# Patient Record
Sex: FEMALE | Race: WHITE | NOT HISPANIC OR LATINO
[De-identification: names, ages, dates, MRNs, and addresses within clinical notes are randomized per-mention and may not be internally consistent; named-entity substitution may affect disease eponyms.]

---

## 2017-01-19 ENCOUNTER — RECORD ABSTRACTING (OUTPATIENT)
Age: 72
End: 2017-01-19

## 2017-01-19 DIAGNOSIS — Z87.898 PERSONAL HISTORY OF OTHER SPECIFIED CONDITIONS: ICD-10-CM

## 2017-01-19 DIAGNOSIS — Z86.79 PERSONAL HISTORY OF OTHER DISEASES OF THE CIRCULATORY SYSTEM: ICD-10-CM

## 2017-01-19 DIAGNOSIS — Z95.0 PRESENCE OF CARDIAC PACEMAKER: ICD-10-CM

## 2017-01-19 DIAGNOSIS — Z78.9 OTHER SPECIFIED HEALTH STATUS: ICD-10-CM

## 2017-01-19 DIAGNOSIS — Z92.89 PERSONAL HISTORY OF OTHER MEDICAL TREATMENT: ICD-10-CM

## 2017-10-17 ENCOUNTER — APPOINTMENT (OUTPATIENT)
Dept: CARDIOLOGY | Facility: CLINIC | Age: 72
End: 2017-10-17

## 2017-10-17 VITALS
HEIGHT: 65 IN | HEART RATE: 84 BPM | OXYGEN SATURATION: 98 % | WEIGHT: 149 LBS | SYSTOLIC BLOOD PRESSURE: 135 MMHG | DIASTOLIC BLOOD PRESSURE: 83 MMHG | BODY MASS INDEX: 24.83 KG/M2

## 2017-10-18 ENCOUNTER — APPOINTMENT (OUTPATIENT)
Dept: CARDIOLOGY | Facility: CLINIC | Age: 72
End: 2017-10-18

## 2017-10-18 VITALS
SYSTOLIC BLOOD PRESSURE: 119 MMHG | BODY MASS INDEX: 24.83 KG/M2 | DIASTOLIC BLOOD PRESSURE: 73 MMHG | OXYGEN SATURATION: 97 % | WEIGHT: 149 LBS | HEIGHT: 65 IN | HEART RATE: 63 BPM

## 2017-10-18 RX ORDER — PAROXETINE HYDROCHLORIDE 10 MG/1
10 TABLET, FILM COATED ORAL
Qty: 90 | Refills: 0 | Status: ACTIVE | COMMUNITY
Start: 2017-08-23

## 2017-10-18 RX ORDER — LEVOTHYROXINE SODIUM 0.1 MG/1
100 TABLET ORAL
Qty: 90 | Refills: 0 | Status: ACTIVE | COMMUNITY
Start: 2017-08-23

## 2017-10-18 RX ORDER — CILOSTAZOL 50 MG/1
50 TABLET ORAL
Qty: 180 | Refills: 0 | Status: ACTIVE | COMMUNITY
Start: 2017-08-23

## 2017-11-01 ENCOUNTER — OUTPATIENT (OUTPATIENT)
Dept: OUTPATIENT SERVICES | Facility: HOSPITAL | Age: 72
LOS: 1 days | Discharge: HOME | End: 2017-11-01

## 2017-11-01 DIAGNOSIS — I25.10 ATHEROSCLEROTIC HEART DISEASE OF NATIVE CORONARY ARTERY WITHOUT ANGINA PECTORIS: ICD-10-CM

## 2017-11-01 DIAGNOSIS — C90.00 MULTIPLE MYELOMA NOT HAVING ACHIEVED REMISSION: ICD-10-CM
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
## 2017-11-23 ENCOUNTER — INPATIENT (INPATIENT)
Facility: HOSPITAL | Age: 72
LOS: 10 days | Discharge: HOME HEALTH | End: 2017-12-04
Attending: INTERNAL MEDICINE | Admitting: INTERNAL MEDICINE

## 2017-11-23 DIAGNOSIS — C90.00 MULTIPLE MYELOMA NOT HAVING ACHIEVED REMISSION: ICD-10-CM
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
## 2017-11-29 ENCOUNTER — APPOINTMENT (OUTPATIENT)
Dept: CARDIOLOGY | Facility: CLINIC | Age: 72
End: 2017-11-29

## 2017-12-04 ENCOUNTER — RESULT REVIEW (OUTPATIENT)
Age: 72
End: 2017-12-04

## 2017-12-05 ENCOUNTER — APPOINTMENT (OUTPATIENT)
Dept: HEMATOLOGY ONCOLOGY | Facility: CLINIC | Age: 72
End: 2017-12-05

## 2017-12-05 VITALS
BODY MASS INDEX: 25.66 KG/M2 | HEIGHT: 65 IN | WEIGHT: 154 LBS | HEART RATE: 84 BPM | RESPIRATION RATE: 14 BRPM | SYSTOLIC BLOOD PRESSURE: 161 MMHG | DIASTOLIC BLOOD PRESSURE: 82 MMHG | TEMPERATURE: 98.1 F

## 2017-12-05 RX ORDER — PNV NO.95/FERROUS FUM/FOLIC AC 28MG-0.8MG
500-400 TABLET ORAL
Qty: 120 | Refills: 3 | Status: ACTIVE | COMMUNITY
Start: 2017-12-05 | End: 1900-01-01

## 2017-12-07 ENCOUNTER — APPOINTMENT (OUTPATIENT)
Age: 72
End: 2017-12-07

## 2017-12-07 ENCOUNTER — OUTPATIENT (OUTPATIENT)
Dept: OUTPATIENT SERVICES | Facility: HOSPITAL | Age: 72
LOS: 1 days | Discharge: HOME | End: 2017-12-07

## 2017-12-07 DIAGNOSIS — E83.52 HYPERCALCEMIA: ICD-10-CM

## 2017-12-07 DIAGNOSIS — M84.421A PATHOLOGICAL FRACTURE, RIGHT HUMERUS, INITIAL ENCOUNTER FOR FRACTURE: ICD-10-CM

## 2017-12-07 DIAGNOSIS — C90.00 MULTIPLE MYELOMA NOT HAVING ACHIEVED REMISSION: ICD-10-CM
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
## 2017-12-07 DIAGNOSIS — S80.12XD CONTUSION OF LEFT LOWER LEG, SUBSEQUENT ENCOUNTER: ICD-10-CM

## 2017-12-07 DIAGNOSIS — I10 ESSENTIAL (PRIMARY) HYPERTENSION: ICD-10-CM

## 2017-12-07 LAB
25(OH)D3 SERPL-MCNC: 36 NG/ML
B2 MICROGLOB SERPL-MCNC: 4.8 MG/L
IGA FLD-MCNC: 33 MG/DL
IGG FLD-MCNC: 2860 MG/DL
IGM SERPL-MCNC: 9 MG/DL
INTERPRETATION SERPL IFE-IMP: DETECTED
VITAMIN D2 SERPL-MCNC: <4 NG/ML
VITAMIN D3 SERPL-MCNC: 36 NG/ML
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
## 2017-12-08 DIAGNOSIS — D63.0 ANEMIA IN NEOPLASTIC DISEASE: ICD-10-CM

## 2017-12-08 DIAGNOSIS — S80.12XA CONTUSION OF LEFT LOWER LEG, INITIAL ENCOUNTER: ICD-10-CM

## 2017-12-08 DIAGNOSIS — I25.10 ATHEROSCLEROTIC HEART DISEASE OF NATIVE CORONARY ARTERY WITHOUT ANGINA PECTORIS: ICD-10-CM

## 2017-12-08 DIAGNOSIS — I12.9 HYPERTENSIVE CHRONIC KIDNEY DISEASE WITH STAGE 1 THROUGH STAGE 4 CHRONIC KIDNEY DISEASE, OR UNSPECIFIED CHRONIC KIDNEY DISEASE: ICD-10-CM
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
## 2017-12-08 DIAGNOSIS — Z91.81 HISTORY OF FALLING: ICD-10-CM

## 2017-12-08 DIAGNOSIS — Z95.0 PRESENCE OF CARDIAC PACEMAKER: ICD-10-CM

## 2017-12-08 DIAGNOSIS — N17.9 ACUTE KIDNEY FAILURE, UNSPECIFIED: ICD-10-CM

## 2017-12-08 DIAGNOSIS — N18.3 CHRONIC KIDNEY DISEASE, STAGE 3 (MODERATE): ICD-10-CM
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
## 2017-12-08 DIAGNOSIS — C90.00 MULTIPLE MYELOMA NOT HAVING ACHIEVED REMISSION: ICD-10-CM

## 2017-12-08 DIAGNOSIS — F03.90 UNSPECIFIED DEMENTIA WITHOUT BEHAVIORAL DISTURBANCE: ICD-10-CM

## 2017-12-08 DIAGNOSIS — M79.81 NONTRAUMATIC HEMATOMA OF SOFT TISSUE: ICD-10-CM

## 2017-12-08 DIAGNOSIS — G91.2 (IDIOPATHIC) NORMAL PRESSURE HYDROCEPHALUS: ICD-10-CM

## 2017-12-11 ENCOUNTER — APPOINTMENT (OUTPATIENT)
Dept: HEMATOLOGY ONCOLOGY | Facility: CLINIC | Age: 72
End: 2017-12-11

## 2017-12-12 ENCOUNTER — APPOINTMENT (OUTPATIENT)
Dept: INFUSION THERAPY | Facility: CLINIC | Age: 72
End: 2017-12-12

## 2017-12-13 ENCOUNTER — APPOINTMENT (OUTPATIENT)
Dept: INFUSION THERAPY | Facility: CLINIC | Age: 72
End: 2017-12-13

## 2017-12-14 ENCOUNTER — OUTPATIENT (OUTPATIENT)
Dept: OUTPATIENT SERVICES | Facility: HOSPITAL | Age: 72
LOS: 1 days | Discharge: HOME | End: 2017-12-14

## 2017-12-14 ENCOUNTER — APPOINTMENT (OUTPATIENT)
Dept: WOUND CARE | Facility: CLINIC | Age: 72
End: 2017-12-14

## 2017-12-14 DIAGNOSIS — C90.00 MULTIPLE MYELOMA NOT HAVING ACHIEVED REMISSION: ICD-10-CM
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
## 2017-12-14 DIAGNOSIS — Z02.9 ENCOUNTER FOR ADMINISTRATIVE EXAMINATIONS, UNSPECIFIED: ICD-10-CM

## 2017-12-14 LAB
ALBUMIN SERPL-MCNC: 3.8 G/DL
ALBUMIN/GLOB SERPL: 0.95
ALP SERPL-CCNC: 110 IU/L
ALT SERPL-CCNC: 13 IU/L
ANION GAP SERPL CALC-SCNC: 9 MEQ/L
AST SERPL-CCNC: 24 IU/L
BASOPHILS # BLD: 0.04 TH/MM3
BASOPHILS NFR BLD: 0.6 %
BILIRUB SERPL-MCNC: 0.7 MG/DL
BUN SERPL-MCNC: 18 MG/DL
BUN/CREAT SERPL: 19.6 %
CALCIUM SERPL-MCNC: 9.1 MG/DL
CHLORIDE SERPL-SCNC: 108 MEQ/L
CO2 SERPL-SCNC: 22 MEQ/L
CREAT SERPL-MCNC: 0.92 MG/DL
EOSINOPHIL # BLD: 0.14 TH/MM3
EOSINOPHIL NFR BLD: 2.2 %
ERYTHROCYTE [DISTWIDTH] IN BLOOD BY AUTOMATED COUNT: 16.2 %
GFR SERPL CREATININE-BSD FRML MDRD: 60
GLUCOSE SERPL-MCNC: 79 MG/DL
GRANULOCYTES # BLD: 3.45 TH/MM3
GRANULOCYTES NFR BLD: 54.7 %
HCT VFR BLD AUTO: 26.2 %
HGB BLD-MCNC: 9 G/DL
IMM GRANULOCYTES # BLD: 0.06 TH/MM3
IMM GRANULOCYTES NFR BLD: 1 %
LYMPHOCYTES # BLD: 1.98 TH/MM3
LYMPHOCYTES NFR BLD: 31.4 %
MCH RBC QN AUTO: 31.8 PG
MCHC RBC AUTO-ENTMCNC: 34.4 G/DL
MCV RBC AUTO: 92.6 FL
MONOCYTES # BLD: 0.64 TH/MM3
MONOCYTES NFR BLD: 10.1 %
PLATELET # BLD: 366 TH/MM3
PMV BLD AUTO: 8.4 FL
POTASSIUM SERPL-SCNC: 3.5 MMOL/L
PROT SERPL-MCNC: 7.8 G/DL
RBC # BLD AUTO: 2.83 MIL/MM3
SODIUM SERPL-SCNC: 139 MEQ/L
WBC # BLD: 6.31 TH/MM3

## 2017-12-19 ENCOUNTER — APPOINTMENT (OUTPATIENT)
Dept: INFUSION THERAPY | Facility: CLINIC | Age: 72
End: 2017-12-19

## 2017-12-19 VITALS
SYSTOLIC BLOOD PRESSURE: 110 MMHG | TEMPERATURE: 97.9 F | HEART RATE: 62 BPM | RESPIRATION RATE: 18 BRPM | DIASTOLIC BLOOD PRESSURE: 55 MMHG

## 2017-12-19 VITALS
SYSTOLIC BLOOD PRESSURE: 133 MMHG | RESPIRATION RATE: 18 BRPM | DIASTOLIC BLOOD PRESSURE: 47 MMHG | TEMPERATURE: 97.5 F | HEART RATE: 66 BPM
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
## 2017-12-19 DIAGNOSIS — S80.12XA CONTUSION OF LEFT LOWER LEG, INITIAL ENCOUNTER: ICD-10-CM
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
## 2017-12-19 LAB
BASOPHILS # BLD: 0.09 TH/MM3
BASOPHILS NFR BLD: 1.6 %
EOSINOPHIL # BLD: 0.16 TH/MM3
EOSINOPHIL NFR BLD: 2.8 %
ERYTHROCYTE [DISTWIDTH] IN BLOOD BY AUTOMATED COUNT: 17.2 %
GRANULOCYTES # BLD: 2.6 TH/MM3
GRANULOCYTES NFR BLD: 45.4 %
HCT VFR BLD AUTO: 29.3 %
HGB BLD-MCNC: 9.8 G/DL
IMM GRANULOCYTES # BLD: 0.11 TH/MM3
IMM GRANULOCYTES NFR BLD: 1.9 %
LYMPHOCYTES # BLD: 2.04 TH/MM3
LYMPHOCYTES NFR BLD: 35.7 %
MCH RBC QN AUTO: 32.3 PG
MCHC RBC AUTO-ENTMCNC: 33.4 G/DL
MCV RBC AUTO: 96.7 FL
MONOCYTES # BLD: 0.72 TH/MM3
MONOCYTES NFR BLD: 12.6 %
PLATELET # BLD: 370 TH/MM3
PMV BLD AUTO: 8.7 FL
RBC # BLD AUTO: 3.03 MIL/MM3
WBC # BLD: 5.72 TH/MM3

## 2017-12-26 ENCOUNTER — APPOINTMENT (OUTPATIENT)
Dept: INFUSION THERAPY | Facility: CLINIC | Age: 72
End: 2017-12-26

## 2017-12-26 ENCOUNTER — RX RENEWAL (OUTPATIENT)
Age: 72
End: 2017-12-26

## 2017-12-26 VITALS
SYSTOLIC BLOOD PRESSURE: 145 MMHG | RESPIRATION RATE: 16 BRPM | TEMPERATURE: 97.5 F | DIASTOLIC BLOOD PRESSURE: 61 MMHG | HEART RATE: 83 BPM

## 2017-12-26 LAB
BASOPHILS # BLD: 0.06 TH/MM3
BASOPHILS NFR BLD: 1 %
EOSINOPHIL # BLD: 0.16 TH/MM3
EOSINOPHIL NFR BLD: 2.6 %
ERYTHROCYTE [DISTWIDTH] IN BLOOD BY AUTOMATED COUNT: 16 %
GRANULOCYTES # BLD: 3.35 TH/MM3
GRANULOCYTES NFR BLD: 55.1 %
HCT VFR BLD AUTO: 24.5 %
HGB BLD-MCNC: 8.2 G/DL
IMM GRANULOCYTES # BLD: 0.25 TH/MM3
IMM GRANULOCYTES NFR BLD: 4.1 %
LYMPHOCYTES # BLD: 1.61 TH/MM3
LYMPHOCYTES NFR BLD: 26.4 %
MCH RBC QN AUTO: 31.9 PG
MCHC RBC AUTO-ENTMCNC: 33.5 G/DL
MCV RBC AUTO: 95.3 FL
MONOCYTES # BLD: 0.66 TH/MM3
MONOCYTES NFR BLD: 10.8 %
PLATELET # BLD: 303 TH/MM3
PMV BLD AUTO: 9.2 FL
RBC # BLD AUTO: 2.57 MIL/MM3
WBC # BLD: 6.09 TH/MM3

## 2017-12-26 RX ORDER — PROCHLORPERAZINE MALEATE 10 MG/1
10 TABLET ORAL EVERY 6 HOURS
Qty: 30 | Refills: 3 | Status: ACTIVE | COMMUNITY
Start: 2017-12-26 | End: 1900-01-01

## 2017-12-28 ENCOUNTER — APPOINTMENT (OUTPATIENT)
Dept: CARDIOLOGY | Facility: CLINIC | Age: 72
End: 2017-12-28

## 2017-12-28 DIAGNOSIS — S81.802A UNSPECIFIED OPEN WOUND, LEFT LOWER LEG, INITIAL ENCOUNTER: ICD-10-CM
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
## 2018-01-02 ENCOUNTER — APPOINTMENT (OUTPATIENT)
Dept: INFUSION THERAPY | Facility: CLINIC | Age: 73
End: 2018-01-02

## 2018-01-02 LAB
BASOPHILS # BLD: 0.06 TH/MM3
BASOPHILS NFR BLD: 0.8 %
EOSINOPHIL # BLD: 0.14 TH/MM3
EOSINOPHIL NFR BLD: 1.9 %
ERYTHROCYTE [DISTWIDTH] IN BLOOD BY AUTOMATED COUNT: 18 %
GRANULOCYTES # BLD: 4.13 TH/MM3
GRANULOCYTES NFR BLD: 54.6 %
HCT VFR BLD AUTO: 25.4 %
HGB BLD-MCNC: 8.4 G/DL
IMM GRANULOCYTES # BLD: 0.17 TH/MM3
IMM GRANULOCYTES NFR BLD: 2.2 %
LYMPHOCYTES # BLD: 2.23 TH/MM3
LYMPHOCYTES NFR BLD: 29.5 %
MCH RBC QN AUTO: 32.8 PG
MCHC RBC AUTO-ENTMCNC: 33.1 G/DL
MCV RBC AUTO: 99.2 FL
MONOCYTES # BLD: 0.83 TH/MM3
MONOCYTES NFR BLD: 11 %
PLATELET # BLD: 327 TH/MM3
PMV BLD AUTO: 9.1 FL
RBC # BLD AUTO: 2.56 MIL/MM3
WBC # BLD: 7.56 TH/MM3

## 2018-01-09 ENCOUNTER — APPOINTMENT (OUTPATIENT)
Dept: INFUSION THERAPY | Facility: CLINIC | Age: 73
End: 2018-01-09

## 2018-01-09 ENCOUNTER — APPOINTMENT (OUTPATIENT)
Dept: HEMATOLOGY ONCOLOGY | Facility: CLINIC | Age: 73
End: 2018-01-09

## 2018-01-09 VITALS
WEIGHT: 143 LBS | BODY MASS INDEX: 23.82 KG/M2 | HEIGHT: 65 IN | HEART RATE: 86 BPM | DIASTOLIC BLOOD PRESSURE: 76 MMHG | TEMPERATURE: 98.7 F | SYSTOLIC BLOOD PRESSURE: 139 MMHG | RESPIRATION RATE: 17 BRPM

## 2018-01-09 DIAGNOSIS — G47.00 INSOMNIA, UNSPECIFIED: ICD-10-CM

## 2018-01-10 LAB
ALBUMIN MFR SERPL ELPH: 52.6 %
ALBUMIN SERPL ELPH-MCNC: 4.4 G/DL
ALBUMIN SERPL-MCNC: 4.1 G/DL
ALBUMIN/GLOB SERPL ELPH: 1.1
ALBUMIN/GLOB SERPL: 1.14
ALP SERPL-CCNC: 57 IU/L
ALPHA1 GLOB MFR SERPL ELPH: 4.9 %
ALPHA1 GLOB SERPL ELPH-MCNC: 0.4 G/DL
ALPHA2 GLOB MFR SERPL ELPH: 6.4 %
ALPHA2 GLOB SERPL ELPH-MCNC: 0.5 G/DL
ALT SERPL-CCNC: 11 IU/L
ANION GAP SERPL CALC-SCNC: 10 MEQ/L
AST SERPL-CCNC: 17 IU/L
B-GLOBULIN SERPL ELPH-MCNC: 0.8 G/DL
BASOPHILS # BLD: 0.01 TH/MM3
BASOPHILS NFR BLD: 0.2 %
BETA1 GLOB MFR SERPL ELPH: 9.3 %
BILIRUB SERPL-MCNC: 0.6 MG/DL
BUN SERPL-MCNC: 21 MG/DL
BUN/CREAT SERPL: 21.6 %
CALCIUM SERPL-MCNC: 8.7 MG/DL
CHLORIDE SERPL-SCNC: 106 MEQ/L
CO2 SERPL-SCNC: 20 MEQ/L
CREAT SERPL-MCNC: 0.97 MG/DL
EOSINOPHIL # BLD: 0.21 TH/MM3
EOSINOPHIL NFR BLD: 4.3 %
ERYTHROCYTE [DISTWIDTH] IN BLOOD BY AUTOMATED COUNT: 17.2 %
GAMMA GLOB MFR SERPL ELPH: 26.8 %
GAMMA GLOB SERPL ELPH-MCNC: 2.3 G/DL
GFR SERPL CREATININE-BSD FRML MDRD: 56
GLUCOSE SERPL-MCNC: 84 MG/DL
GRANULOCYTES # BLD: 3 TH/MM3
GRANULOCYTES NFR BLD: 61.1 %
HCT VFR BLD AUTO: 27.8 %
HGB BLD-MCNC: 8.9 G/DL
IMM GRANULOCYTES # BLD: 0.06 TH/MM3
IMM GRANULOCYTES NFR BLD: 1.2 %
LYMPHOCYTES # BLD: 1.32 TH/MM3
LYMPHOCYTES NFR BLD: 26.9 %
M PROTEIN MFR SERPL ELPH: 24.6 %
M-SPIKE SPE (NORTH): 2.1 G/DL
MCH RBC QN AUTO: 32.5 PG
MCHC RBC AUTO-ENTMCNC: 32 G/DL
MCV RBC AUTO: 101.5 FL
MONOCYTES # BLD: 0.31 TH/MM3
MONOCYTES NFR BLD: 6.3 %
PLATELET # BLD: 279 TH/MM3
PMV BLD AUTO: 9.5 FL
POTASSIUM SERPL-SCNC: 3.8 MMOL/L
PROT PATTERN SERPL ELPH-IMP: 8.4 G/DL
PROT PATTERN SERPL ELPH-IMP: NORMAL
PROT SERPL-MCNC: 7.7 G/DL
PROT SERPL-MCNC: 8.4 G/DL
RBC # BLD AUTO: 2.74 MIL/MM3
SODIUM SERPL-SCNC: 136 MEQ/L
WBC # BLD: 4.91 TH/MM3

## 2018-01-11 ENCOUNTER — APPOINTMENT (OUTPATIENT)
Dept: INFUSION THERAPY | Facility: CLINIC | Age: 73
End: 2018-01-11

## 2018-01-11 ENCOUNTER — APPOINTMENT (OUTPATIENT)
Age: 73
End: 2018-01-11

## 2018-01-12 LAB
KAPPA LC FREE SER-MCNC: 9.9 MG/L
KAPPA LC FREE/LAMBDA FREE SER: 0.03
LAMBDA LC FREE SERPL-MCNC: 316.6 MG/L

## 2018-01-15 ENCOUNTER — RX RENEWAL (OUTPATIENT)
Age: 73
End: 2018-01-15

## 2018-01-17 ENCOUNTER — RX RENEWAL (OUTPATIENT)
Age: 73
End: 2018-01-17

## 2018-01-18 ENCOUNTER — APPOINTMENT (OUTPATIENT)
Dept: INFUSION THERAPY | Facility: CLINIC | Age: 73
End: 2018-01-18

## 2018-01-18 LAB
BASOPHILS # BLD: 0.02 TH/MM3
BASOPHILS NFR BLD: 0.5 %
EOSINOPHIL # BLD: 0.31 TH/MM3
EOSINOPHIL NFR BLD: 8.1 %
ERYTHROCYTE [DISTWIDTH] IN BLOOD BY AUTOMATED COUNT: 16.3 %
GRANULOCYTES # BLD: 1.75 TH/MM3
GRANULOCYTES NFR BLD: 45.5 %
HCT VFR BLD AUTO: 24.4 %
HGB BLD-MCNC: 8.8 G/DL
IMM GRANULOCYTES # BLD: 0.01 TH/MM3
IMM GRANULOCYTES NFR BLD: 0.3 %
LYMPHOCYTES # BLD: 1.19 TH/MM3
LYMPHOCYTES NFR BLD: 31 %
MCH RBC QN AUTO: 33.1 PG
MCHC RBC AUTO-ENTMCNC: 36.1 G/DL
MCV RBC AUTO: 91.7 FL
MONOCYTES # BLD: 0.56 TH/MM3
MONOCYTES NFR BLD: 14.6 %
PLATELET # BLD: 252 TH/MM3
PMV BLD AUTO: 8.7 FL
RBC # BLD AUTO: 2.66 MIL/MM3
WBC # BLD: 3.84 TH/MM3

## 2018-01-19 LAB
ANION GAP SERPL CALC-SCNC: 12 MEQ/L
BUN SERPL-MCNC: 20 MG/DL
BUN/CREAT SERPL: 25 %
CALCIUM SERPL-MCNC: 7.7 MG/DL
CHLORIDE SERPL-SCNC: 98 MEQ/L
CO2 SERPL-SCNC: 22 MEQ/L
CREAT SERPL-MCNC: 0.8 MG/DL
GFR SERPL CREATININE-BSD FRML MDRD: 71
GLUCOSE SERPL-MCNC: 80 MG/DL
POTASSIUM SERPL-SCNC: 3.6 MMOL/L
SODIUM SERPL-SCNC: 132 MEQ/L

## 2018-01-19 NOTE — PHYSICAL EXAM
[Fully active, able to carry on all pre-disease performance without restriction] : Status 0 - Fully active, able to carry on all pre-disease performance without restriction [Normal] : grossly intact [de-identified] : PPM [de-identified] : Has a cast, right humerus

## 2018-01-19 NOTE — CONSULT LETTER
[Dear  ___] : Dear  [unfilled], [Referral Letter:] : I am referring [unfilled] to you for further evaluation.  My most recent evaluation follows. [Please see my note below.] : Please see my note below. [Referral Closing:] : Thank you very much for seeing this patient.  If you have any questions, please do not hesitate to contact me. [Sincerely,] : Sincerely, [FreeTextEntry3] : Hemanth

## 2018-01-19 NOTE — HISTORY OF PRESENT ILLNESS
[Disease:__________________________] : Disease: [unfilled] [de-identified] : This is a 72 year old female with history of sick sinus syndrome s/p PPM and as well as progressive dementia over 1.5 years was to see a specialist in [facility] who presented to [facility] due to a fall  as a result of  worsening gait on 11/23/17.   Her work up demonstrated , left tibial swelling, Acute displaced fracture of the distal humeral diaphysis.\par Multiple lytic bone lesions.  CT neck Multiple lytic lesions,  CT head At least mild to moderate hydrocephalus involving lateral, third and fourth ventricles. Innumerable lytic lesions.\par \par She was seen by Neurology who recommended outpatient LP and  Orhto casted the right humerus fracture and recommended Oncologic Ortho evaluation.  \par \par On admission she was noted to be Hypercalcemic and in ADRY. She received pamidronate 60 mg once with IVF and her hypercalcemia  and ADRY  resolved.\par \par Work up demonstrated lambda 352, kappa 8.5 with ration of 0.02,  SPEP M spike of 2.7 , Beta 2 7.2. Urine Total protein of  284 H  Albumin was 4.0\par \par She underwent a bone marrow biopsy on 12/1/17 that showed  flow cytometry performed at [facility]    Oncology report - Clonal (IgG-Lambda) plasma cells (28.2% of total), normocellular to mildly hypercellular (30-40%)    with a sheet of plasma cells, a large aggregate of plasma cells and    markedly increased interstitial plasma cells.  Megakaryocytes are    present.  Maturing myeloid and erythroid precursors are present.  Amyloid    deposition is not identified.  Special stains for Amyloid (Crystal Violet    and Congo Red) are negative.  Stainable iron is present (1-2+).  \par \par She also had a left lower extremity hematoma evacuation.\par \par She is here with her kids [de-identified] : 01/09/2018\par  Patient is here today with her family members for a follow up visit. So far got 4 doses of velcade and is currently on Day 8 of revlimid.  As per family, they noticed that her dementia is more worse  after starting VRD. Also reports Insomnia. She was evaluated by neurologist and so far, the  reason for her dementia is not established. Decision on Lumbar puncture is not yet made as neurologist did not think her symptoms were related to Normal pressure hydrocephalus.\par  ALso complaints of insomnia since being on VRD, likely from steriods. Her right arm has been healing well since the surgery and she will begin physical therapy soon. \par PET/CT results reviewed. Noted to have multiple FDG avid lytic lesions including right anterior second rib with SUV of 9.5.\par

## 2018-01-19 NOTE — REVIEW OF SYSTEMS
[Fatigue] : fatigue [Joint Pain] : joint pain [Negative] : Allergic/Immunologic [Recent Change In Weight] : ~T no recent weight change

## 2018-01-19 NOTE — ASSESSMENT
[FreeTextEntry1] : IgG Lamda Multiple Myeloma with lytic bone lesions, anemia and hypercalcemia  on presentation, normal LDH,  Beta 2 7.2. Repeat levels were as follows , Albumin was 4.0. repeat Beta 2- 4.8\par  FISH- hyperploidy and gain of CCN1 which puts her at  standard risk. \par   PET/CT multiple FDG avid lytic lesion consistent with myeloma.\par -- On VRd. So far has been tolerating well except for insomnia and diffuse bony pain and worsening of dementia. Some of these symptoms are partly related to steriods. We will add trazadone to help with her sleep.\par She will be due for monthly Zometa on 01/16.\par \par \par Plan:\par -will continue with   cycle 2  VRD Velcade 1.3 mg/m2 SC weekly, Dexa 20 mg weekly ( we like to avoid psychosis) and Revlimid 15 mg  daily 2 weeks on one week off. No plan to increase the Revlimid dose for now.\par -Zometa 3.5 mg monthly for now\par -continue calcium and Vitamin D\par -on ASA\par --on Acyclovir 400 mg BID\par -will check Serum ROSALIND and Immunoglobin levels \par -she will also be seen in [facility] [place] [facility] for 2nd opinion\par \par Pathologic Right Humerus fracture s/p fixation - Dr. Glenn Salter\par -will begin physical therapy soon\par -the femurs are affected as well. I expect a good response to treatment and family informed me that she had a hard time recovering from the Humerus surgery.  There fore would like to hold off any surgical intervention at this time for the femurs.\par \par Dementia -worsening gain and Hydrocephalus on CT.\par -Neurology follow up Dr. Fields. As per family did not think her symptoms were related to normal pressure hydrocephalus. As of Now cause of her dementia is not established. They will follow up with Dr. Fields again and decide on Lumbar puncture.\par -- Steriods may be contributing partly for insomnia. However she is already on   20 mg. For now will continue with the same dose but family is aware they can decrease the dose as was discussed.\par \par Anemia due to MM  \par -stable\par \par Hypercalcemia from MM\par -resolved\par \par RTC on Day 1 of cycle 2. She was on 2nd weeek of revmalid. \par

## 2018-01-25 ENCOUNTER — APPOINTMENT (OUTPATIENT)
Dept: INFUSION THERAPY | Facility: CLINIC | Age: 73
End: 2018-01-25

## 2018-01-25 ENCOUNTER — APPOINTMENT (OUTPATIENT)
Dept: HEMATOLOGY ONCOLOGY | Facility: CLINIC | Age: 73
End: 2018-01-25

## 2018-01-25 ENCOUNTER — APPOINTMENT (OUTPATIENT)
Dept: CARDIOLOGY | Facility: CLINIC | Age: 73
End: 2018-01-25

## 2018-01-25 VITALS
WEIGHT: 145 LBS | RESPIRATION RATE: 17 BRPM | HEIGHT: 65 IN | DIASTOLIC BLOOD PRESSURE: 63 MMHG | SYSTOLIC BLOOD PRESSURE: 96 MMHG | BODY MASS INDEX: 24.16 KG/M2 | TEMPERATURE: 98.6 F | HEART RATE: 70 BPM

## 2018-01-25 VITALS — SYSTOLIC BLOOD PRESSURE: 118 MMHG | DIASTOLIC BLOOD PRESSURE: 56 MMHG

## 2018-01-25 DIAGNOSIS — Z86.79 PERSONAL HISTORY OF OTHER DISEASES OF THE CIRCULATORY SYSTEM: ICD-10-CM

## 2018-01-25 LAB
BASOPHILS # BLD: 0.06 TH/MM3
BASOPHILS NFR BLD: 1.3 %
EOSINOPHIL # BLD: 0.29 TH/MM3
EOSINOPHIL NFR BLD: 6.5 %
ERYTHROCYTE [DISTWIDTH] IN BLOOD BY AUTOMATED COUNT: 15.8 %
GRANULOCYTES # BLD: 1.51 TH/MM3
GRANULOCYTES NFR BLD: 34 %
HCT VFR BLD AUTO: 29 %
HGB BLD-MCNC: 9.6 G/DL
IMM GRANULOCYTES # BLD: 0.02 TH/MM3
IMM GRANULOCYTES NFR BLD: 0.4 %
LYMPHOCYTES # BLD: 1.83 TH/MM3
LYMPHOCYTES NFR BLD: 41 %
MCH RBC QN AUTO: 33 PG
MCHC RBC AUTO-ENTMCNC: 33.1 G/DL
MCV RBC AUTO: 99.7 FL
MONOCYTES # BLD: 0.75 TH/MM3
MONOCYTES NFR BLD: 16.8 %
PLATELET # BLD: 342 TH/MM3
PMV BLD AUTO: 9.7 FL
RBC # BLD AUTO: 2.91 MIL/MM3
WBC # BLD: 4.46 TH/MM3

## 2018-01-25 NOTE — PHYSICAL EXAM
[Normal] : normal appearance, no rash, nodules, vesicles, ulcers, erythema [Restricted in physically strenuous activity but ambulatory and able to carry out work of a light or sedentary nature] : Status 1- Restricted in physically strenuous activity but ambulatory and able to carry out work of a light or sedentary nature, e.g., light house work, office work [de-identified] : PPM [de-identified] : Healing well, her right arm scar [de-identified] : Wide stence gaint, dementia, incontinence,

## 2018-01-25 NOTE — ASSESSMENT
[FreeTextEntry1] : IgG Lamda Multiple Myeloma with lytic bone lesions, anemia and hypercalcemia  on presentation, normal LDH,  Beta 2 7.2. Repeat levels were as follows , Albumin was 4.0. repeat Beta 2- 4.8\par  FISH- hyperploidy and gain of CCN1 which puts her at  standard risk. \par   PET/CT multiple FDG avid lytic lesion consistent with myeloma.\par -- On VRd. So far has been tolerating well except for insomnia and diffuse bony pain and worsening of dementia. Some of these symptoms are partly related to steroids.  Trazadone to help with her sleep. We lowered her dose to 16 mg weekly of dexa \par \par \par Plan:\par -will continue with   cycle 3  VRD Velcade 1.3 mg/m2 SC weekly, Dexa 16 mg weekly ( we like to avoid psychosis) and Revlimid 15 mg  daily 2 weeks on one week off. No plan to increase the Revlimid dose for now.\par -Zometa 4.0 mg now GFR is better\par -continue calcium and Vitamin D\par -on ASA\par --on Acyclovir 400 mg BID\par -will check Myeloma pane next visit\par -she will also be seen in [facility] [place] for 2nd opinion\par \par Pathologic Right Humerus fracture s/p fixation - Dr. Glenn Salter\par -on physical therapy soon\par -the femurs are affected as well. I expect a good response to treatment and family informed me that she had a hard time recovering from the Humerus surgery.  There fore would like to hold off any surgical intervention at this time for the femurs.\par \par Dementia -worsening gain and urinary inconstance  and Hydrocephalus on CT.\par -Neurology follow up Dr. Fields. As per family did not think her symptoms were related to normal pressure hydrocephalus. As of Now cause of her dementia is not established. They will follow up with Dr. Fields again and decide on Lumbar puncture.\par \par \par Anemia due to MM  \par -stable\par \par Hypercalcemia from MM\par -resolved now hypocalcemic\par \par Edema\par -most likley from revlamid mild\par -she is now on Lasix 20 mg every other day and it helps as per daughter\par \par Her daughter is always with her\par \par \par RTC  3 weeks

## 2018-01-25 NOTE — HISTORY OF PRESENT ILLNESS
[Disease:__________________________] : Disease: [unfilled] [de-identified] : This is a 72 year old female with history of sick sinus syndrome s/p PPM and as well as progressive dementia over 1.5 years was to see a specialist in [facility] who presented to [facility] due to a fall  as a result of  worsening gait on 11/23/17.   Her work up demonstrated , left tibial swelling, Acute displaced fracture of the distal humeral diaphysis.\par Multiple lytic bone lesions.  CT neck Multiple lytic lesions,  CT head At least mild to moderate hydrocephalus involving lateral, third and fourth ventricles. Innumerable lytic lesions.\par \par She was seen by Neurology who recommended outpatient LP and  Orhto casted the right humerus fracture and recommended Oncologic Ortho evaluation.  \par \par On admission she was noted to be Hypercalcemic and in ADRY. She received pamidronate 60 mg once with IVF and her hypercalcemia  and ADRY  resolved.\par \par Work up demonstrated lambda 352, kappa 8.5 with ration of 0.02,  SPEP M spike of 2.7 , Beta 2 7.2. Urine Total protein of  284 H  Albumin was 4.0\par \par She underwent a bone marrow biopsy on 12/1/17 that showed  flow cytometry performed at [facility]    Oncology report - Clonal (IgG-Lambda) plasma cells (28.2% of total), normocellular to mildly hypercellular (30-40%)    with a sheet of plasma cells, a large aggregate of plasma cells and    markedly increased interstitial plasma cells.  Megakaryocytes are    present.  Maturing myeloid and erythroid precursors are present.  Amyloid    deposition is not identified.  Special stains for Amyloid (Crystal Violet    and Congo Red) are negative.  Stainable iron is present (1-2+).  \par \par She also had a left lower extremity hematoma evacuation.\par \par She is here with her kids [Therapy: ___] : Therapy: [unfilled] [Cycle: ___] : Cycle: [unfilled] [Day: ___] : Day: [unfilled] [FreeTextEntry1] : VD was started  initially while we waited for R. On cycle 2 with R [de-identified] : 01/09/2018\par  Patient is here today with her family members for a follow up visit. So far got 4 doses of velcade and is currently on Day 8 of revlimid.  As per family, they noticed that her dementia is more worse  after starting VRD. Also reports Insomnia. She was evaluated by neurologist and so far, the  reason for her dementia is not established. Decision on Lumbar puncture is not yet made as neurologist did not think her symptoms were related to Normal pressure hydrocephalus.\par  ALso complaints of insomnia since being on VRD, likely from steriods. Her right arm has been healing well since the surgery and she will begin physical therapy soon. \par PET/CT results reviewed. Noted to have multiple FDG avid lytic lesions including right anterior second rib with SUV of 9.5.\par \par 1/25/18 \par She is here for follow up for her myeloma. Her  lambda started to fall and mspike went down as well she was only on Revlimid for 8 days,  Her GFR is also improving now 71.\par We decreased her dexamethasone to 16 mg po weekly due to psychosis. She now has mild ankle swelling in bilateral extremities. She now has urinary incontinence. Has bilateral hip pains in morning that improve with ambulation.  She started this cycle 2 on Teusday\par

## 2018-01-25 NOTE — REVIEW OF SYSTEMS
[Fatigue] : fatigue [Joint Pain] : joint pain [Negative] : Allergic/Immunologic [Recent Change In Weight] : ~T no recent weight change [Incontinence] : incontinence [Muscle Pain] : muscle pain [Confused] : confusion [Difficulty Walking] : difficulty walking

## 2018-02-01 ENCOUNTER — APPOINTMENT (OUTPATIENT)
Dept: INFUSION THERAPY | Facility: CLINIC | Age: 73
End: 2018-02-01

## 2018-02-01 ENCOUNTER — RESULT REVIEW (OUTPATIENT)
Age: 73
End: 2018-02-01

## 2018-02-05 ENCOUNTER — RX RENEWAL (OUTPATIENT)
Age: 73
End: 2018-02-05

## 2018-02-08 ENCOUNTER — LABORATORY RESULT (OUTPATIENT)
Age: 73
End: 2018-02-08

## 2018-02-08 ENCOUNTER — APPOINTMENT (OUTPATIENT)
Dept: INFUSION THERAPY | Facility: CLINIC | Age: 73
End: 2018-02-08

## 2018-02-08 RX ORDER — BORTEZOMIB 2.5 MG/1
2.2 INJECTION INTRAVENOUS ONCE
Qty: 0 | Refills: 0 | Status: COMPLETED | OUTPATIENT
Start: 2018-02-08 | End: 2018-02-08

## 2018-02-08 RX ADMIN — BORTEZOMIB 2.2 MILLIGRAM(S): 2.5 INJECTION INTRAVENOUS at 15:59

## 2018-02-09 ENCOUNTER — OUTPATIENT (OUTPATIENT)
Dept: OUTPATIENT SERVICES | Facility: HOSPITAL | Age: 73
LOS: 1 days | Discharge: HOME | End: 2018-02-09

## 2018-02-09 DIAGNOSIS — C90.00 MULTIPLE MYELOMA NOT HAVING ACHIEVED REMISSION: ICD-10-CM

## 2018-02-09 LAB
HCT VFR BLD CALC: 29.5 %
HGB BLD-MCNC: 10.2 G/DL
MCHC RBC-ENTMCNC: 32.7 PG
MCHC RBC-ENTMCNC: 34.6 G/DL
MCV RBC AUTO: 94.6 FL
PLATELET # BLD AUTO: 211 K/UL
PMV BLD: 10.8 FL
RBC # BLD: 3.12 M/UL
RBC # FLD: 14.5 %
WBC # FLD AUTO: 5.04 K/UL

## 2018-02-15 ENCOUNTER — APPOINTMENT (OUTPATIENT)
Dept: INFUSION THERAPY | Facility: CLINIC | Age: 73
End: 2018-02-15

## 2018-02-15 ENCOUNTER — LABORATORY RESULT (OUTPATIENT)
Age: 73
End: 2018-02-15

## 2018-02-15 ENCOUNTER — APPOINTMENT (OUTPATIENT)
Dept: HEMATOLOGY ONCOLOGY | Facility: CLINIC | Age: 73
End: 2018-02-15

## 2018-02-15 LAB
HCT VFR BLD CALC: 29.2 %
HGB BLD-MCNC: 10.1 G/DL
MCHC RBC-ENTMCNC: 33 PG
MCHC RBC-ENTMCNC: 34.6 G/DL
MCV RBC AUTO: 95.4 FL
PLATELET # BLD AUTO: 237 K/UL
PMV BLD: 9.7 FL
RBC # BLD: 3.06 M/UL
RBC # FLD: 14.4 %
WBC # FLD AUTO: 4.62 K/UL

## 2018-02-15 RX ORDER — BORTEZOMIB 2.5 MG/1
2.2 INJECTION INTRAVENOUS ONCE
Qty: 0 | Refills: 0 | Status: COMPLETED | OUTPATIENT
Start: 2018-02-15 | End: 2018-02-15

## 2018-02-15 RX ADMIN — BORTEZOMIB 2.2 MILLIGRAM(S): 2.5 INJECTION INTRAVENOUS at 14:01

## 2018-02-16 LAB
ALBUMIN SERPL ELPH-MCNC: 4.2 G/DL
ALP BLD-CCNC: 93 U/L
ALT SERPL-CCNC: 9 U/L
AST SERPL-CCNC: 17 U/L
BILIRUB DIRECT SERPL-MCNC: 0.1 MG/DL
BILIRUB INDIRECT SERPL-MCNC: 0.2 MG/DL
BILIRUB SERPL-MCNC: 0.3 MG/DL
PROT SERPL-MCNC: 6.9 G/DL

## 2018-02-16 NOTE — RESULTS/DATA
[FreeTextEntry1] :   PROCEDURE DATE:  02/09/2018       INTERPRETATION:  CLINICAL HISTORY / REASON FOR EXAM: Brain metastases.  Multiple myeloma. Dementia.  TECHNIQUE: Noncontrast head CT. Contiguous axial CT images were obtained  from the base of the skull to the vertex before and after the  administration of 100 cc of Optiray 320 intravenous contrast. Coronal and  sagittal reformats provided.  COMPARISON: CT head November 23, 2017.  FINDINGS: There is stable moderate ventricular prominence superimposed upon mild  parenchymal volume loss.  No evidence of acute intracranial hemorrhage. No mass effect or midline  shift.  Gray-white differentiation is maintained.    Patchy hypodensities are seen in the periventricular and subcortical  white matter, nonspecific and without mass-effect, and may represent  areas of  chronic microvascular change.  After administration of intravenous contrast there is no abnormal  parenchymal enhancement.  Visualized paranasal sinuses and mastoid air cells are well-aerated.  There has been mild interval progression of innumerable lytic lesions  throughout the calvarium and skull base.   IMPRESSION:  1.  No evidence of acute intracranial pathology. Stable exam of the brain  since 11/23/2017.  2.  Stable moderate hydrocephalus.  3.  Mild interval progression of innumerable lytic lesions within the  calvarium and skull base compatible with the patient's clinical history  of multiple myeloma.      SANDRA POP M.D., RESIDENT RADIOLOGIST This document has been electronically signed. CHUCKIE DALLAS M.D., ATTENDING RADIOLOGIST This document has been electronically signed. Feb 12 2018  2:14PM

## 2018-02-16 NOTE — PHYSICAL EXAM
[Restricted in physically strenuous activity but ambulatory and able to carry out work of a light or sedentary nature] : Status 1- Restricted in physically strenuous activity but ambulatory and able to carry out work of a light or sedentary nature, e.g., light house work, office work [Normal] : normal appearance, no rash, nodules, vesicles, ulcers, erythema [de-identified] : She has small blister on uppr portion of conjuctiva in left eye and intgerior portion in right that appears to have  ruptured, no pus, no erethema. [de-identified] : PPM [de-identified] : MIld edema in Lower extremities [de-identified] : Healing well, her right arm scar [de-identified] : , dementia, incontinence, ambulatoion difficultires

## 2018-02-16 NOTE — ASSESSMENT
[FreeTextEntry1] : IgG Lamda Multiple Myeloma with lytic bone lesions, anemia and hypercalcemia  on presentation, normal LDH,  Beta 2 7.2. Repeat levels were as follows , Albumin was 4.0. repeat Beta 2- 4.8\par  FISH- hyperploidy and gain of CCN1 which puts her at  standard risk. \par   PET/CT multiple FDG avid lytic lesion consistent with myeloma.\par -- On VRd. So far has been tolerating well except for insomnia and diffuse bony pain and worsening of dementia. Some of these symptoms are partly related to steroids.  Trazadone to help with her sleep. We lowered her dose to 16 mg weekly of dexa \par \par \par Plan:\par -will continue with   cycle 4  VRD Velcade 1.3 mg/m2 SC weekly, Dexa 16 mg weekly ( we like to avoid psychosis) and Revlimid 15 mg  daily 2 weeks on one week off. No plan to increase the Revlimid dose for now. Her myeloma panel is showing response \par -Zometa 4.0 mg now GFR is better monthly \par -continue calcium and Vitamin D\par -on ASA\par --on Acyclovir 400 mg BID\par -will check Myeloma panel \par -will start Maintenance Revla after about 8 months of treatment \par \par Pathologic Right Humerus fracture s/p fixation - Dr. Glenn Salter\par -on physical therapy\par -no intervention planned for hips at this time.\par \par Dementia - most likely Alzheimers \par -Neurology follow up Dr. Fields.\par \par \par Anemia due to MM  \par -stable\par \par Hypercalcemia from MM\par -resolved n\par \par Edema\par -most likley from revlamid mild\par -she is now on Lasix 20 mg every other day and it helps \par \par Her daughter is always with her\par \par \par RTC  3 weeks

## 2018-02-16 NOTE — REVIEW OF SYSTEMS
[Fatigue] : fatigue [Recent Change In Weight] : ~T no recent weight change [Incontinence] : incontinence [Joint Pain] : joint pain [Muscle Pain] : muscle pain [Confused] : confusion [Difficulty Walking] : difficulty walking [Negative] : Allergic/Immunologic [FreeTextEntry3] : see above small blisters conjuctica [de-identified] : Dementia

## 2018-02-16 NOTE — HISTORY OF PRESENT ILLNESS
[Disease:__________________________] : Disease: [unfilled] [de-identified] : This is a 72 year old female with history of sick sinus syndrome s/p PPM and as well as progressive dementia over 1.5 years was to see a specialist in [facility] who presented to [facility] due to a fall  as a result of  worsening gait on 11/23/17.   Her work up demonstrated , left tibial swelling, Acute displaced fracture of the distal humeral diaphysis.\par Multiple lytic bone lesions.  CT neck Multiple lytic lesions,  CT head At least mild to moderate hydrocephalus involving lateral, third and fourth ventricles. Innumerable lytic lesions.\par \par She was seen by Neurology who recommended outpatient LP and  Orhto casted the right humerus fracture and recommended Oncologic Ortho evaluation.  \par \par On admission she was noted to be Hypercalcemic and in ADRY. She received pamidronate 60 mg once with IVF and her hypercalcemia  and ADRY  resolved.\par \par Work up demonstrated lambda 352, kappa 8.5 with ration of 0.02,  SPEP M spike of 2.7 , Beta 2 7.2. Urine Total protein of  284 H  Albumin was 4.0\par \par She underwent a bone marrow biopsy on 12/1/17 that showed  flow cytometry performed at [facility]    Oncology report - Clonal (IgG-Lambda) plasma cells (28.2% of total), normocellular to mildly hypercellular (30-40%)    with a sheet of plasma cells, a large aggregate of plasma cells and    markedly increased interstitial plasma cells.  Megakaryocytes are    present.  Maturing myeloid and erythroid precursors are present.  Amyloid    deposition is not identified.  Special stains for Amyloid (Crystal Violet    and Congo Red) are negative.  Stainable iron is present (1-2+).  \par \par She also had a left lower extremity hematoma evacuation.\par \par She is here with her kids [Therapy: ___] : Therapy: [unfilled] [Cycle: ___] : Cycle: [unfilled] [Day: ___] : Day: [unfilled] [FreeTextEntry1] : VD was started  initially while we waited for R. On cycle 2 with R [de-identified] : 01/09/2018\par  Patient is here today with her family members for a follow up visit. So far got 4 doses of velcade and is currently on Day 8 of revlimid.  As per family, they noticed that her dementia is more worse  after starting VRD. Also reports Insomnia. She was evaluated by neurologist and so far, the  reason for her dementia is not established. Decision on Lumbar puncture is not yet made as neurologist did not think her symptoms were related to Normal pressure hydrocephalus.\par  ALso complaints of insomnia since being on VRD, likely from steriods. Her right arm has been healing well since the surgery and she will begin physical therapy soon. \par PET/CT results reviewed. Noted to have multiple FDG avid lytic lesions including right anterior second rib with SUV of 9.5.\par \par 1/25/18 \par She is here for follow up for her myeloma. Her  lambda started to fall and mspike went down as well she was only on Revlimid for 8 days,  Her GFR is also improving now 71.\par We decreased her dexamethasone to 16 mg po weekly due to psychosis. She now has mild ankle swelling in bilateral extremities. She now has urinary incontinence. Has bilateral hip pains in morning that improve with ambulation.  She started this cycle 2 on Teusday\par \par 2/15/18\par She is here for follow up. Her Neurologist in [facility] found a CT scan from [facility] back that also had hydrocephalus thus Alziemers is the cause of her dementia. They were seen in [facility] and VRD was agreed with although their dosing recommendations was to move it to q28 days. We decided to keep things as is since they are use to this schedule. She small  blisters on eye lids small under 5 mm  one in each lower eye lid. They are not painful.  She is getting out of chair with ease. No other complaints> She also has repeat CT scan of head as per her neurologist results are bellow.\par

## 2018-02-20 LAB
ALBUMIN MFR SERPL ELPH: 62.3 %
ALBUMIN SERPL-MCNC: 4.1 G/DL
ALBUMIN/GLOB SERPL: 1.6 RATIO
ALPHA1 GLOB MFR SERPL ELPH: 4.6 %
ALPHA1 GLOB SERPL ELPH-MCNC: 0.3 G/DL
ALPHA2 GLOB MFR SERPL ELPH: 7.5 %
ALPHA2 GLOB SERPL ELPH-MCNC: 0.5 G/DL
B-GLOBULIN MFR SERPL ELPH: 10.8 %
B-GLOBULIN SERPL ELPH-MCNC: 0.7 G/DL
DEPRECATED KAPPA LC FREE/LAMBDA SER: 0.22 RATIO
GAMMA GLOB FLD ELPH-MCNC: 1 G/DL
GAMMA GLOB MFR SERPL ELPH: 14.8 %
IGG SER QL IEP: 1110 MG/DL
INTERPRETATION SERPL IEP-IMP: NORMAL
KAPPA LC CSF-MCNC: 6.34 MG/DL
KAPPA LC SERPL-MCNC: 1.37 MG/DL
M PROTEIN MFR SERPL ELPH: 11.5 %
MONOCLON BAND OBS SERPL: 0.8 G/DL
PROT SERPL-MCNC: 6.6 G/DL
PROT SERPL-MCNC: 6.6 G/DL

## 2018-02-22 ENCOUNTER — LABORATORY RESULT (OUTPATIENT)
Age: 73
End: 2018-02-22

## 2018-02-22 ENCOUNTER — APPOINTMENT (OUTPATIENT)
Dept: INFUSION THERAPY | Facility: CLINIC | Age: 73
End: 2018-02-22

## 2018-02-22 RX ORDER — BORTEZOMIB 2.5 MG/1
2.2 INJECTION INTRAVENOUS ONCE
Qty: 0 | Refills: 0 | Status: COMPLETED | OUTPATIENT
Start: 2018-02-22 | End: 2018-02-22

## 2018-02-22 RX ADMIN — BORTEZOMIB 2.2 MILLIGRAM(S): 2.5 INJECTION INTRAVENOUS at 14:47

## 2018-02-23 LAB
ANION GAP SERPL CALC-SCNC: 7 MMOL/L
BUN SERPL-MCNC: 17 MG/DL
CALCIUM SERPL-MCNC: 8.6 MG/DL
CHLORIDE SERPL-SCNC: 102 MMOL/L
CO2 SERPL-SCNC: 29 MMOL/L
CREAT SERPL-MCNC: 1 MG/DL
GLUCOSE SERPL-MCNC: 90 MG/DL
HCT VFR BLD CALC: 30.6 %
HGB BLD-MCNC: 10.6 G/DL
MCHC RBC-ENTMCNC: 33.5 PG
MCHC RBC-ENTMCNC: 34.6 G/DL
MCV RBC AUTO: 96.8 FL
PLATELET # BLD AUTO: 236 K/UL
PMV BLD: 11.2 FL
POTASSIUM SERPL-SCNC: 3.6 MMOL/L
RBC # BLD: 3.16 M/UL
RBC # FLD: 14.5 %
SODIUM SERPL-SCNC: 138 MMOL/L
WBC # FLD AUTO: 5.41 K/UL

## 2018-02-26 ENCOUNTER — RX RENEWAL (OUTPATIENT)
Age: 73
End: 2018-02-26

## 2018-03-01 ENCOUNTER — APPOINTMENT (OUTPATIENT)
Dept: INFUSION THERAPY | Facility: CLINIC | Age: 73
End: 2018-03-01

## 2018-03-01 ENCOUNTER — LABORATORY RESULT (OUTPATIENT)
Age: 73
End: 2018-03-01

## 2018-03-01 RX ORDER — ZOLEDRONIC ACID 5 MG/100ML
3.5 INJECTION, SOLUTION INTRAVENOUS ONCE
Qty: 0 | Refills: 0 | Status: COMPLETED | OUTPATIENT
Start: 2018-03-01 | End: 2018-03-01

## 2018-03-01 RX ORDER — ZOLEDRONIC ACID 5 MG/100ML
4 INJECTION, SOLUTION INTRAVENOUS ONCE
Qty: 0 | Refills: 0 | Status: DISCONTINUED | OUTPATIENT
Start: 2018-03-01 | End: 2018-03-01

## 2018-03-01 RX ORDER — BORTEZOMIB 2.5 MG/1
2.2 INJECTION INTRAVENOUS ONCE
Qty: 0 | Refills: 0 | Status: COMPLETED | OUTPATIENT
Start: 2018-03-01 | End: 2018-03-01

## 2018-03-01 RX ADMIN — ZOLEDRONIC ACID 200 MILLIGRAM(S): 5 INJECTION, SOLUTION INTRAVENOUS at 11:44

## 2018-03-01 RX ADMIN — BORTEZOMIB 2.2 MILLIGRAM(S): 2.5 INJECTION INTRAVENOUS at 11:43

## 2018-03-02 LAB
HCT VFR BLD CALC: 31.4 %
HGB BLD-MCNC: 10.8 G/DL
MCHC RBC-ENTMCNC: 32.6 PG
MCHC RBC-ENTMCNC: 34.4 G/DL
MCV RBC AUTO: 94.9 FL
PLATELET # BLD AUTO: 189 K/UL
PMV BLD: 11 FL
RBC # BLD: 3.31 M/UL
RBC # FLD: 13.8 %
WBC # FLD AUTO: 4.84 K/UL

## 2018-03-05 ENCOUNTER — APPOINTMENT (OUTPATIENT)
Dept: CARDIOLOGY | Facility: CLINIC | Age: 73
End: 2018-03-05

## 2018-03-05 VITALS
BODY MASS INDEX: 23.99 KG/M2 | DIASTOLIC BLOOD PRESSURE: 70 MMHG | HEIGHT: 65 IN | SYSTOLIC BLOOD PRESSURE: 124 MMHG | WEIGHT: 144 LBS | HEART RATE: 71 BPM

## 2018-03-05 DIAGNOSIS — R07.89 OTHER CHEST PAIN: ICD-10-CM

## 2018-03-05 RX ORDER — ZOLEDRONIC ACID 4 MG/5ML
4 INJECTION, SOLUTION, CONCENTRATE INTRAVENOUS
Refills: 0 | Status: ACTIVE | COMMUNITY

## 2018-03-07 ENCOUNTER — RX RENEWAL (OUTPATIENT)
Age: 73
End: 2018-03-07

## 2018-03-08 ENCOUNTER — APPOINTMENT (OUTPATIENT)
Dept: INFUSION THERAPY | Facility: CLINIC | Age: 73
End: 2018-03-08

## 2018-03-08 ENCOUNTER — APPOINTMENT (OUTPATIENT)
Dept: HEMATOLOGY ONCOLOGY | Facility: CLINIC | Age: 73
End: 2018-03-08

## 2018-03-08 ENCOUNTER — LABORATORY RESULT (OUTPATIENT)
Age: 73
End: 2018-03-08

## 2018-03-08 VITALS
HEIGHT: 65 IN | BODY MASS INDEX: 24.32 KG/M2 | SYSTOLIC BLOOD PRESSURE: 110 MMHG | HEART RATE: 76 BPM | WEIGHT: 146 LBS | RESPIRATION RATE: 14 BRPM | TEMPERATURE: 96.7 F | DIASTOLIC BLOOD PRESSURE: 60 MMHG

## 2018-03-08 RX ORDER — BORTEZOMIB 2.5 MG/1
2.2 INJECTION INTRAVENOUS ONCE
Qty: 0 | Refills: 0 | Status: COMPLETED | OUTPATIENT
Start: 2018-03-08 | End: 2018-03-08

## 2018-03-08 RX ADMIN — BORTEZOMIB 2.2 MILLIGRAM(S): 2.5 INJECTION INTRAVENOUS at 13:22

## 2018-03-09 LAB
ALBUMIN SERPL ELPH-MCNC: 4.1 G/DL
ALP BLD-CCNC: 68 U/L
ALT SERPL-CCNC: 9 U/L
ANION GAP SERPL CALC-SCNC: 15 MMOL/L
AST SERPL-CCNC: 13 U/L
BILIRUB SERPL-MCNC: 0.2 MG/DL
BUN SERPL-MCNC: 24 MG/DL
CALCIUM SERPL-MCNC: 8.6 MG/DL
CHLORIDE SERPL-SCNC: 101 MMOL/L
CO2 SERPL-SCNC: 21 MMOL/L
CREAT SERPL-MCNC: 0.9 MG/DL
GLUCOSE SERPL-MCNC: 91 MG/DL
HCT VFR BLD CALC: 28.6 %
HGB BLD-MCNC: 10.1 G/DL
MCHC RBC-ENTMCNC: 33.8 PG
MCHC RBC-ENTMCNC: 35.3 G/DL
MCV RBC AUTO: 95.7 FL
PLATELET # BLD AUTO: 169 K/UL
PMV BLD: 10.1 FL
POTASSIUM SERPL-SCNC: 4 MMOL/L
PROT SERPL-MCNC: 6.2 G/DL
RBC # BLD: 2.99 M/UL
RBC # FLD: 14.5 %
SODIUM SERPL-SCNC: 137 MMOL/L
WBC # FLD AUTO: 5.21 K/UL

## 2018-03-09 NOTE — REVIEW OF SYSTEMS
[Fatigue] : fatigue [Recent Change In Weight] : ~T no recent weight change [Incontinence] : incontinence [Joint Pain] : joint pain [Muscle Pain] : muscle pain [Confused] : confusion [Difficulty Walking] : difficulty walking [Negative] : Allergic/Immunologic [FreeTextEntry3] : s [de-identified] : Dementia

## 2018-03-09 NOTE — PHYSICAL EXAM
[Restricted in physically strenuous activity but ambulatory and able to carry out work of a light or sedentary nature] : Status 1- Restricted in physically strenuous activity but ambulatory and able to carry out work of a light or sedentary nature, e.g., light house work, office work [Normal] : normal appearance, no rash, nodules, vesicles, ulcers, erythema [de-identified] : previous findings resolved [de-identified] : PPM [de-identified] : MIld edema in Lower extremities [de-identified] :  right arm scar [de-identified] : , dementia, incontinence, ambulation difficulties but stable

## 2018-03-09 NOTE — ASSESSMENT
[FreeTextEntry1] : IgG Lamda Multiple Myeloma with lytic bone lesions, anemia and hypercalcemia  on presentation, normal LDH,  Beta 2 7.2. Repeat levels were as follows , Albumin was 4.0. repeat Beta 2- 4.8\par  FISH- hyperploidy and gain of CCN1 which puts her at  standard risk. \par   PET/CT multiple FDG avid lytic lesion consistent with myeloma.\par -- On VRd. So far has been tolerating well and is stable. No more bony pains. Gait is stable.  We lowered her dose to 16 mg weekly of dexa \par \par \par Plan:\par -will continue with   cycle 5  VRD Velcade 1.3 mg/m2 SC weekly, Dexa 16 mg weekly ( we like to avoid psychosis) and Revlimid 15 mg  daily 2 weeks on one week off. No plan to increase the Revlimid dose for now. Her myeloma panel is responding with Mspike now 0.8 from 2.2 and light chain ration is now normal\par -Zometa 3.5 mg now GFR is better monthly \par -continue calcium and Vitamin D\par -on ASA\par --on Acyclovir 400 mg BID\par -will check Myeloma panel \par -will start Maintenance Revla or velcade  after about 8 months of treatment,  we may need to use velcade the family need to give her oral meds in person and its difficutl\par \par Pathologic Right Humerus fracture s/p fixation - Dr. Glenn Salter\par -on physical therapy\par \par \par Dementia - most likely Alzheimers \par -Neurology follow up Dr. Fields.\par \par \par Anemia due to MM  \par -stable\par \par Hypercalcemia from MM\par -resolved n\par \par Edema\par -most likely from revlamid mild\par -she is now on Lasix 20 mg \par \par Rash resolved maybe was form Revlmadin\par \par \par RTC  3 weeks, repeat myleoma panel sent

## 2018-03-09 NOTE — HISTORY OF PRESENT ILLNESS
[Therapy: ___] : Therapy: [unfilled] [Cycle: ___] : Cycle: [unfilled] [Day: ___] : Day: [unfilled] [FreeTextEntry1] : VD was started  initially while we waited for R. On cycle 2 with R [de-identified] : 01/09/2018\par  Patient is here today with her family members for a follow up visit. So far got 4 doses of velcade and is currently on Day 8 of revlimid.  As per family, they noticed that her dementia is more worse  after starting VRD. Also reports Insomnia. She was evaluated by neurologist and so far, the  reason for her dementia is not established. Decision on Lumbar puncture is not yet made as neurologist did not think her symptoms were related to Normal pressure hydrocephalus.\par  ALso complaints of insomnia since being on VRD, likely from steriods. Her right arm has been healing well since the surgery and she will begin physical therapy soon. \par PET/CT results reviewed. Noted to have multiple FDG avid lytic lesions including right anterior second rib with SUV of 9.5.\par \par 1/25/18 \par She is here for follow up for her myeloma. Her  lambda started to fall and mspike went down as well she was only on Revlimid for 8 days,  Her GFR is also improving now 71.\par We decreased her dexamethasone to 16 mg po weekly due to psychosis. She now has mild ankle swelling in bilateral extremities. She now has urinary incontinence. Has bilateral hip pains in morning that improve with ambulation.  She started this cycle 2 on Teusday\par \par 2/15/18\par She is here for follow up. Her Neurologist in [facility] found a CT scan from [facility] back that also had hydrocephalus thus Alziemers is the cause of her dementia. They were seen in [facility] and VRD was agreed with although their dosing recommendations was to move it to q28 days. We decided to keep things as is since they are use to this schedule. She small  blisters on eye lids small under 5 mm  one in each lower eye lid. They are not painful.  She is getting out of chair with ease. No other complaints> She also has repeat CT scan of head as per her neurologist results are bellow.\par \par 3/9/18\par She is here for follow up. She is dong well. She is here with her daughter. Trazodone is helping. No pains. He rash in the eyes has resolved they did not see optho.\par  [Disease:__________________________] : Disease: [unfilled] [de-identified] : This is a 72 year old female with history of sick sinus syndrome s/p PPM and as well as progressive dementia over 1.5 years was to see a specialist in [facility] who presented to [facility] due to a fall  as a result of  worsening gait on 11/23/17.   Her work up demonstrated , left tibial swelling, Acute displaced fracture of the distal humeral diaphysis.\par Multiple lytic bone lesions.  CT neck Multiple lytic lesions,  CT head At least mild to moderate hydrocephalus involving lateral, third and fourth ventricles. Innumerable lytic lesions.\par \par She was seen by Neurology who recommended outpatient LP and  Orhto casted the right humerus fracture and recommended Oncologic Ortho evaluation.  \par \par On admission she was noted to be Hypercalcemic and in ADRY. She received pamidronate 60 mg once with IVF and her hypercalcemia  and ADRY  resolved.\par \par Work up demonstrated lambda 352, kappa 8.5 with ration of 0.02,  SPEP M spike of 2.7 , Beta 2 7.2. Urine Total protein of  284 H  Albumin was 4.0\par \par She underwent a bone marrow biopsy on 12/1/17 that showed  flow cytometry performed at [facility]    Oncology report - Clonal (IgG-Lambda) plasma cells (28.2% of total), normocellular to mildly hypercellular (30-40%)    with a sheet of plasma cells, a large aggregate of plasma cells and    markedly increased interstitial plasma cells.  Megakaryocytes are    present.  Maturing myeloid and erythroid precursors are present.  Amyloid    deposition is not identified.  Special stains for Amyloid (Crystal Violet    and Congo Red) are negative.  Stainable iron is present (1-2+).  \par \par She also had a left lower extremity hematoma evacuation.\par \par She is here with her kids

## 2018-03-12 ENCOUNTER — RX RENEWAL (OUTPATIENT)
Age: 73
End: 2018-03-12

## 2018-03-13 LAB
ALBUMIN MFR SERPL ELPH: 64.8 %
ALBUMIN SERPL-MCNC: 4.1 G/DL
ALBUMIN/GLOB SERPL: 1.8 RATIO
ALPHA1 GLOB MFR SERPL ELPH: 4.7 %
ALPHA1 GLOB SERPL ELPH-MCNC: 0.3 G/DL
ALPHA2 GLOB MFR SERPL ELPH: 7.2 %
ALPHA2 GLOB SERPL ELPH-MCNC: 0.5 G/DL
B-GLOBULIN MFR SERPL ELPH: 11.3 %
B-GLOBULIN SERPL ELPH-MCNC: 0.7 G/DL
DEPRECATED KAPPA LC FREE/LAMBDA SER: 0.29 RATIO
GAMMA GLOB FLD ELPH-MCNC: 0.8 G/DL
GAMMA GLOB MFR SERPL ELPH: 12 %
INTERPRETATION SERPL IEP-IMP: NORMAL
KAPPA LC CSF-MCNC: 4.51 MG/DL
KAPPA LC SERPL-MCNC: 1.32 MG/DL
M PROTEIN MFR SERPL ELPH: 8.6 %
MONOCLON BAND OBS SERPL: 0.6 G/DL
PROT SERPL-MCNC: 6.4 G/DL
PROT SERPL-MCNC: 6.4 G/DL

## 2018-03-14 ENCOUNTER — RX RENEWAL (OUTPATIENT)
Age: 73
End: 2018-03-14

## 2018-03-15 ENCOUNTER — APPOINTMENT (OUTPATIENT)
Dept: INFUSION THERAPY | Facility: CLINIC | Age: 73
End: 2018-03-15

## 2018-03-15 ENCOUNTER — LABORATORY RESULT (OUTPATIENT)
Age: 73
End: 2018-03-15

## 2018-03-15 LAB
HCT VFR BLD CALC: 31.7 %
HGB BLD-MCNC: 11.1 G/DL
MCHC RBC-ENTMCNC: 33.4 PG
MCHC RBC-ENTMCNC: 35 G/DL
MCV RBC AUTO: 95.5 FL
PLATELET # BLD AUTO: 227 K/UL
PMV BLD: 10.9 FL
RBC # BLD: 3.32 M/UL
RBC # FLD: 14.3 %
WBC # FLD AUTO: 5.18 K/UL

## 2018-03-15 RX ORDER — BORTEZOMIB 2.5 MG/1
2.2 INJECTION INTRAVENOUS ONCE
Qty: 0 | Refills: 0 | Status: COMPLETED | OUTPATIENT
Start: 2018-03-15 | End: 2018-03-15

## 2018-03-15 RX ADMIN — BORTEZOMIB 2.2 MILLIGRAM(S): 2.5 INJECTION INTRAVENOUS at 11:28

## 2018-03-16 ENCOUNTER — RX RENEWAL (OUTPATIENT)
Age: 73
End: 2018-03-16

## 2018-03-22 ENCOUNTER — APPOINTMENT (OUTPATIENT)
Dept: INFUSION THERAPY | Facility: CLINIC | Age: 73
End: 2018-03-22

## 2018-03-22 ENCOUNTER — LABORATORY RESULT (OUTPATIENT)
Age: 73
End: 2018-03-22

## 2018-03-22 LAB
HCT VFR BLD CALC: 30.5 %
HGB BLD-MCNC: 10.6 G/DL
MCHC RBC-ENTMCNC: 32.9 PG
MCHC RBC-ENTMCNC: 34.8 G/DL
MCV RBC AUTO: 94.7 FL
PLATELET # BLD AUTO: 193 K/UL
PMV BLD: 10.3 FL
RBC # BLD: 3.22 M/UL
RBC # FLD: 13.8 %
WBC # FLD AUTO: 4.81 K/UL

## 2018-03-22 RX ORDER — BORTEZOMIB 2.5 MG/1
2.2 INJECTION INTRAVENOUS ONCE
Qty: 0 | Refills: 0 | Status: COMPLETED | OUTPATIENT
Start: 2018-03-22 | End: 2018-03-22

## 2018-03-22 RX ORDER — DEXAMETHASONE 0.5 MG/5ML
16 ELIXIR ORAL ONCE
Qty: 0 | Refills: 0 | Status: DISCONTINUED | OUTPATIENT
Start: 2018-03-22 | End: 2018-03-22

## 2018-03-22 RX ORDER — ZOLEDRONIC ACID 5 MG/100ML
3.5 INJECTION, SOLUTION INTRAVENOUS ONCE
Qty: 0 | Refills: 0 | Status: COMPLETED | OUTPATIENT
Start: 2018-03-22 | End: 2018-03-22

## 2018-03-22 RX ADMIN — BORTEZOMIB 2.2 MILLIGRAM(S): 2.5 INJECTION INTRAVENOUS at 11:08

## 2018-03-22 RX ADMIN — ZOLEDRONIC ACID 200 MILLIGRAM(S): 5 INJECTION, SOLUTION INTRAVENOUS at 11:09

## 2018-03-23 LAB
ANION GAP SERPL CALC-SCNC: 12 MMOL/L
BUN SERPL-MCNC: 22 MG/DL
CALCIUM SERPL-MCNC: 8.3 MG/DL
CHLORIDE SERPL-SCNC: 102 MMOL/L
CO2 SERPL-SCNC: 26 MMOL/L
CREAT SERPL-MCNC: 0.9 MG/DL
GLUCOSE SERPL-MCNC: 73 MG/DL
POTASSIUM SERPL-SCNC: 4.5 MMOL/L
SODIUM SERPL-SCNC: 140 MMOL/L

## 2018-03-28 ENCOUNTER — APPOINTMENT (OUTPATIENT)
Dept: INFUSION THERAPY | Facility: CLINIC | Age: 73
End: 2018-03-28

## 2018-03-28 ENCOUNTER — LABORATORY RESULT (OUTPATIENT)
Age: 73
End: 2018-03-28

## 2018-03-28 ENCOUNTER — APPOINTMENT (OUTPATIENT)
Dept: HEMATOLOGY ONCOLOGY | Facility: CLINIC | Age: 73
End: 2018-03-28

## 2018-03-28 VITALS
TEMPERATURE: 96.5 F | HEIGHT: 65 IN | SYSTOLIC BLOOD PRESSURE: 142 MMHG | WEIGHT: 145 LBS | DIASTOLIC BLOOD PRESSURE: 81 MMHG | HEART RATE: 93 BPM | BODY MASS INDEX: 24.16 KG/M2 | RESPIRATION RATE: 14 BRPM

## 2018-03-28 LAB
HCT VFR BLD CALC: 30.8 %
HGB BLD-MCNC: 10.6 G/DL
MCHC RBC-ENTMCNC: 32.5 PG
MCHC RBC-ENTMCNC: 34.4 G/DL
MCV RBC AUTO: 94.5 FL
PLATELET # BLD AUTO: 211 K/UL
PMV BLD: 9.7 FL
RBC # BLD: 3.26 M/UL
RBC # FLD: 14.2 %
WBC # FLD AUTO: 5.28 K/UL

## 2018-03-28 RX ORDER — BORTEZOMIB 2.5 MG/1
2.2 INJECTION INTRAVENOUS ONCE
Qty: 0 | Refills: 0 | Status: COMPLETED | OUTPATIENT
Start: 2018-03-28 | End: 2018-03-28

## 2018-03-28 RX ORDER — ZOLEDRONIC ACID 5 MG/100ML
3.5 INJECTION, SOLUTION INTRAVENOUS ONCE
Qty: 0 | Refills: 0 | Status: COMPLETED | OUTPATIENT
Start: 2018-03-28 | End: 2018-03-28

## 2018-03-28 RX ADMIN — BORTEZOMIB 2.2 MILLIGRAM(S): 2.5 INJECTION INTRAVENOUS at 12:34

## 2018-03-28 RX ADMIN — ZOLEDRONIC ACID 200 MILLIGRAM(S): 5 INJECTION, SOLUTION INTRAVENOUS at 12:35

## 2018-03-28 NOTE — REVIEW OF SYSTEMS
[Fatigue] : fatigue [Recent Change In Weight] : ~T no recent weight change [Incontinence] : incontinence [Joint Pain] : joint pain [Muscle Pain] : no muscle pain [Confused] : confusion [Difficulty Walking] : difficulty walking [Negative] : Allergic/Immunologic [FreeTextEntry3] : s [de-identified] : Dementia

## 2018-03-28 NOTE — PHYSICAL EXAM
[Restricted in physically strenuous activity but ambulatory and able to carry out work of a light or sedentary nature] : Status 1- Restricted in physically strenuous activity but ambulatory and able to carry out work of a light or sedentary nature, e.g., light house work, office work [Normal] : normal appearance, no rash, nodules, vesicles, ulcers, erythema [de-identified] : PPM [de-identified] : MIld edema in Lower extremities [de-identified] :  right arm scar [de-identified] : dementia, incontinence, ambulation difficulties but stable

## 2018-03-28 NOTE — HISTORY OF PRESENT ILLNESS
[Disease:__________________________] : Disease: [unfilled] [de-identified] : This is a 72 year old female with history of sick sinus syndrome s/p PPM and as well as progressive dementia over 1.5 years was to see a specialist in [facility] who presented to [facility] due to a fall  as a result of  worsening gait on 11/23/17.   Her work up demonstrated , left tibial swelling, Acute displaced fracture of the distal humeral diaphysis.\par Multiple lytic bone lesions.  CT neck Multiple lytic lesions,  CT head At least mild to moderate hydrocephalus involving lateral, third and fourth ventricles. Innumerable lytic lesions.\par \par She was seen by Neurology who recommended outpatient LP and  Orhto casted the right humerus fracture and recommended Oncologic Ortho evaluation.  \par \par On admission she was noted to be Hypercalcemic and in ADRY. She received pamidronate 60 mg once with IVF and her hypercalcemia  and ADRY  resolved.\par \par Work up demonstrated lambda 352, kappa 8.5 with ration of 0.02,  SPEP M spike of 2.7 , Beta 2 7.2. Urine Total protein of  284 H  Albumin was 4.0\par \par She underwent a bone marrow biopsy on 12/1/17 that showed  flow cytometry performed at [facility]    Oncology report - Clonal (IgG-Lambda) plasma cells (28.2% of total), normocellular to mildly hypercellular (30-40%)    with a sheet of plasma cells, a large aggregate of plasma cells and    markedly increased interstitial plasma cells.  Megakaryocytes are    present.  Maturing myeloid and erythroid precursors are present.  Amyloid    deposition is not identified.  Special stains for Amyloid (Crystal Violet    and Congo Red) are negative.  Stainable iron is present (1-2+).  \par \par She also had a left lower extremity hematoma evacuation.\par \par She is here with her kids [Therapy: ___] : Therapy: [unfilled] [Cycle: ___] : Cycle: [unfilled] [Day: ___] : Day: [unfilled] [FreeTextEntry1] : VD was started  initially while we waited for R. On cycle 2 with R [de-identified] : 01/09/2018\par  Patient is here today with her family members for a follow up visit. So far got 4 doses of velcade and is currently on Day 8 of revlimid.  As per family, they noticed that her dementia is more worse  after starting VRD. Also reports Insomnia. She was evaluated by neurologist and so far, the  reason for her dementia is not established. Decision on Lumbar puncture is not yet made as neurologist did not think her symptoms were related to Normal pressure hydrocephalus.\par  ALso complaints of insomnia since being on VRD, likely from steriods. Her right arm has been healing well since the surgery and she will begin physical therapy soon. \par PET/CT results reviewed. Noted to have multiple FDG avid lytic lesions including right anterior second rib with SUV of 9.5.\par \par 1/25/18 \par She is here for follow up for her myeloma. Her  lambda started to fall and mspike went down as well she was only on Revlimid for 8 days,  Her GFR is also improving now 71.\par We decreased her dexamethasone to 16 mg po weekly due to psychosis. She now has mild ankle swelling in bilateral extremities. She now has urinary incontinence. Has bilateral hip pains in morning that improve with ambulation.  She started this cycle 2 on Teusday\par \par 2/15/18\par She is here for follow up. Her Neurologist in [facility] found a CT scan from [facility] back that also had hydrocephalus thus Alziemers is the cause of her dementia. [place] [facility] and [place]RD was agreed with although their dosing recommendations was to move it to q28 days. We decided to keep things as is since they are use to this schedule. She small  blisters on eye lids small under 5 mm  one in each lower eye lid. They are not painful.  She is getting out of chair with ease. No other complaints> She also has repeat CT scan of head as per her neurologist results are bellow.\par \par 3/9/18\par She is here for follow up. She is dong well. She is here with her daughter. Trazodone is helping. No pains. He rash in the eyes has resolved they did not see optho.\par \par \par 3/28/18\par She is here for follow up with her son. She is doing well.  Son states she is physically better.  No complaints. Swelling in legs improved.

## 2018-03-28 NOTE — ASSESSMENT
[FreeTextEntry1] : IgG Lamda Multiple Myeloma with lytic bone lesions, anemia and hypercalcemia  on presentation, normal LDH,  Beta 2 7.2. Repeat levels were as follows , Albumin was 4.0. repeat Beta 2- 4.8\par  FISH- hyperploidy and gain of CCN1 which puts her at  standard risk. \par   PET/CT multiple FDG avid lytic lesion consistent with myeloma.\par -- On VRd. So far has been tolerating well and is stable. No more bony pains. Gait is stable.  We lowered her dose to 16 mg weekly of dexa \par \par \par Plan:\par -will continue with   cycle 6  VRD Velcade 1.3 mg/m2 SC weekly, Dexa 16 mg weekly ( we like to avoid psychosis) and Revlimid 15 mg  daily 2 weeks on 1 week off. No plan to increase the Revlimid dose for now. Her myeloma is responding with fall in Mspiek and Lamda. \par -Zometa 3.5 mg now GFR is better monthly \par -continue calcium and Vitamin D\par -on ASA\par --on Acyclovir 400 mg BID\par -will check Myeloma panel  next visit CMP today \par -will start Maintenance Revla or velcade  after about ` 8 months of treatment micheal we see optimum response,  we may need to use velcade the family need to give her oral meds in person and its difficult but will see\par \par Pathologic Right Humerus fracture s/p fixation - Dr. Glenn Salter\par -on physical therapy\par \par \par Dementia - most likely Alzheimers \par -Neurology follow up Dr. 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

## 2018-03-29 ENCOUNTER — APPOINTMENT (OUTPATIENT)
Dept: INFUSION THERAPY | Facility: CLINIC | Age: 73
End: 2018-03-29

## 2018-03-29 LAB
ALBUMIN SERPL ELPH-MCNC: 4.2 G/DL
ALP BLD-CCNC: 56 U/L
ALT SERPL-CCNC: 9 U/L
ANION GAP SERPL CALC-SCNC: 15 MMOL/L
AST SERPL-CCNC: 16 U/L
BILIRUB SERPL-MCNC: 0.3 MG/DL
BUN SERPL-MCNC: 24 MG/DL
CALCIUM SERPL-MCNC: 8.4 MG/DL
CHLORIDE SERPL-SCNC: 103 MMOL/L
CO2 SERPL-SCNC: 23 MMOL/L
CREAT SERPL-MCNC: 1 MG/DL
GLUCOSE SERPL-MCNC: 80 MG/DL
POTASSIUM SERPL-SCNC: 4 MMOL/L
PROT SERPL-MCNC: 6.1 G/DL
SODIUM SERPL-SCNC: 141 MMOL/L

## 2018-04-04 ENCOUNTER — RX RENEWAL (OUTPATIENT)
Age: 73
End: 2018-04-04

## 2018-04-04 ENCOUNTER — LABORATORY RESULT (OUTPATIENT)
Age: 73
End: 2018-04-04

## 2018-04-04 ENCOUNTER — APPOINTMENT (OUTPATIENT)
Dept: INFUSION THERAPY | Facility: CLINIC | Age: 73
End: 2018-04-04

## 2018-04-04 LAB
HCT VFR BLD CALC: 31.4 %
HGB BLD-MCNC: 11 G/DL
MCHC RBC-ENTMCNC: 33.2 PG
MCHC RBC-ENTMCNC: 35 G/DL
MCV RBC AUTO: 94.9 FL
PLATELET # BLD AUTO: 225 K/UL
PMV BLD: 10.7 FL
RBC # BLD: 3.31 M/UL
RBC # FLD: 14.6 %
WBC # FLD AUTO: 4.03 K/UL

## 2018-04-04 RX ORDER — BORTEZOMIB 2.5 MG/1
2.2 INJECTION INTRAVENOUS ONCE
Qty: 0 | Refills: 0 | Status: COMPLETED | OUTPATIENT
Start: 2018-04-04 | End: 2018-04-04

## 2018-04-04 RX ADMIN — BORTEZOMIB 2.2 MILLIGRAM(S): 2.5 INJECTION INTRAVENOUS at 11:05

## 2018-04-05 ENCOUNTER — APPOINTMENT (OUTPATIENT)
Dept: INFUSION THERAPY | Facility: CLINIC | Age: 73
End: 2018-04-05

## 2018-04-12 ENCOUNTER — APPOINTMENT (OUTPATIENT)
Dept: INFUSION THERAPY | Facility: CLINIC | Age: 73
End: 2018-04-12

## 2018-04-12 ENCOUNTER — LABORATORY RESULT (OUTPATIENT)
Age: 73
End: 2018-04-12

## 2018-04-12 LAB
HCT VFR BLD CALC: 31.5 %
HGB BLD-MCNC: 10.9 G/DL
MCHC RBC-ENTMCNC: 32.8 PG
MCHC RBC-ENTMCNC: 34.6 G/DL
MCV RBC AUTO: 94.9 FL
PLATELET # BLD AUTO: 170 K/UL
PMV BLD: 10.4 FL
RBC # BLD: 3.32 M/UL
RBC # FLD: 14.4 %
WBC # FLD AUTO: 4.62 K/UL

## 2018-04-12 RX ORDER — BORTEZOMIB 2.5 MG/1
2.2 INJECTION INTRAVENOUS ONCE
Qty: 0 | Refills: 0 | Status: COMPLETED | OUTPATIENT
Start: 2018-04-12 | End: 2018-04-12

## 2018-04-12 RX ADMIN — BORTEZOMIB 2.2 MILLIGRAM(S): 2.5 INJECTION INTRAVENOUS at 11:19

## 2018-04-18 ENCOUNTER — APPOINTMENT (OUTPATIENT)
Dept: INFUSION THERAPY | Facility: CLINIC | Age: 73
End: 2018-04-18

## 2018-04-18 ENCOUNTER — LABORATORY RESULT (OUTPATIENT)
Age: 73
End: 2018-04-18

## 2018-04-18 ENCOUNTER — APPOINTMENT (OUTPATIENT)
Dept: HEMATOLOGY ONCOLOGY | Facility: CLINIC | Age: 73
End: 2018-04-18

## 2018-04-18 VITALS
TEMPERATURE: 96.3 F | WEIGHT: 150 LBS | RESPIRATION RATE: 14 BRPM | BODY MASS INDEX: 24.99 KG/M2 | SYSTOLIC BLOOD PRESSURE: 151 MMHG | DIASTOLIC BLOOD PRESSURE: 75 MMHG | HEART RATE: 86 BPM | HEIGHT: 65 IN

## 2018-04-18 LAB
ALBUMIN SERPL ELPH-MCNC: 4.2 G/DL
ALP BLD-CCNC: 43 U/L
ALT SERPL-CCNC: 8 U/L
ANION GAP SERPL CALC-SCNC: 16 MMOL/L
AST SERPL-CCNC: 13 U/L
BILIRUB SERPL-MCNC: 0.4 MG/DL
BUN SERPL-MCNC: 19 MG/DL
CALCIUM SERPL-MCNC: 8.9 MG/DL
CHLORIDE SERPL-SCNC: 102 MMOL/L
CO2 SERPL-SCNC: 22 MMOL/L
CREAT SERPL-MCNC: 1 MG/DL
GLUCOSE SERPL-MCNC: 94 MG/DL
HCT VFR BLD CALC: 29.4 %
HGB BLD-MCNC: 10.4 G/DL
MCHC RBC-ENTMCNC: 33.1 PG
MCHC RBC-ENTMCNC: 35.4 G/DL
MCV RBC AUTO: 93.6 FL
PLATELET # BLD AUTO: 171 K/UL
PMV BLD: 9.6 FL
POTASSIUM SERPL-SCNC: 3.8 MMOL/L
PROT SERPL-MCNC: 6.2 G/DL
RBC # BLD: 3.14 M/UL
RBC # FLD: 14.6 %
SODIUM SERPL-SCNC: 140 MMOL/L
WBC # FLD AUTO: 3.66 K/UL

## 2018-04-18 RX ORDER — BORTEZOMIB 2.5 MG/1
2.2 INJECTION INTRAVENOUS ONCE
Qty: 0 | Refills: 0 | Status: COMPLETED | OUTPATIENT
Start: 2018-04-18 | End: 2018-04-18

## 2018-04-18 RX ADMIN — BORTEZOMIB 2.2 MILLIGRAM(S): 2.5 INJECTION INTRAVENOUS at 11:35

## 2018-04-18 NOTE — PHYSICAL EXAM
[Restricted in physically strenuous activity but ambulatory and able to carry out work of a light or sedentary nature] : Status 1- Restricted in physically strenuous activity but ambulatory and able to carry out work of a light or sedentary nature, e.g., light house work, office work [Normal] : normal appearance, no rash, nodules, vesicles, ulcers, erythema [de-identified] : PPM [de-identified] : MIld edema in Lower extremities [de-identified] :  right arm scar [de-identified] : dementia, incontinence, ambulation difficulties but stable

## 2018-04-18 NOTE — REVIEW OF SYSTEMS
[Fatigue] : fatigue [Incontinence] : incontinence [Confused] : confusion [Difficulty Walking] : difficulty walking [Negative] : Heme/Lymph [Recent Change In Weight] : ~T no recent weight change [Dry Eyes] : dryness of the eyes [Joint Pain] : no joint pain [Muscle Pain] : no muscle pain [FreeTextEntry3] : Alina  [de-identified] : Dementia

## 2018-04-18 NOTE — HISTORY OF PRESENT ILLNESS
[Disease:__________________________] : Disease: [unfilled] [Therapy: ___] : Therapy: [unfilled] [Cycle: ___] : Cycle: [unfilled] [Day: ___] : Day: [unfilled] [de-identified] : This is a 72 year old female with history of sick sinus syndrome s/p PPM and as well as progressive dementia over 1.5 years was to see a specialist in [facility] who presented to [facility] due to a fall  as a result of  worsening gait on 11/23/17.   Her work up demonstrated , left tibial swelling, Acute displaced fracture of the distal humeral diaphysis.\par Multiple lytic bone lesions.  CT neck Multiple lytic lesions,  CT head At least mild to moderate hydrocephalus involving lateral, third and fourth ventricles. Innumerable lytic lesions.\par \par She was seen by Neurology who recommended outpatient LP and  Orhto casted the right humerus fracture and recommended Oncologic Ortho evaluation.  \par \par On admission she was noted to be Hypercalcemic and in ADRY. She received pamidronate 60 mg once with IVF and her hypercalcemia  and ADRY  resolved.\par \par Work up demonstrated lambda 352, kappa 8.5 with ration of 0.02,  SPEP M spike of 2.7 , Beta 2 7.2. Urine Total protein of  284 H  Albumin was 4.0\par \par She underwent a bone marrow biopsy on 12/1/17 that showed  flow cytometry performed at [facility]    Oncology report - Clonal (IgG-Lambda) plasma cells (28.2% of total), normocellular to mildly hypercellular (30-40%)    with a sheet of plasma cells, a large aggregate of plasma cells and    markedly increased interstitial plasma cells.  Megakaryocytes are    present.  Maturing myeloid and erythroid precursors are present.  Amyloid    deposition is not identified.  Special stains for Amyloid (Crystal Violet    and Congo Red) are negative.  Stainable iron is present (1-2+).  \par \par She also had a left lower extremity hematoma evacuation.\par \par She is here with her kids [FreeTextEntry1] : VD was started  initially while we waited for R. On cycle 2 with R [de-identified] : 01/09/2018\par  Patient is here today with her family members for a follow up visit. So far got 4 doses of velcade and is currently on Day 8 of revlimid.  As per family, they noticed that her dementia is more worse  after starting VRD. Also reports Insomnia. She was evaluated by neurologist and so far, the  reason for her dementia is not established. Decision on Lumbar puncture is not yet made as neurologist did not think her symptoms were related to Normal pressure hydrocephalus.\par  ALso complaints of insomnia since being on VRD, likely from steriods. Her right arm has been healing well since the surgery and she will begin physical therapy soon. \par PET/CT results reviewed. Noted to have multiple FDG avid lytic lesions including right anterior second rib with SUV of 9.5.\par \par 1/25/18 \par She is here for follow up for her myeloma. Her  lambda started to fall and mspike went down as well she was only on Revlimid for 8 days,  Her GFR is also improving now 71.\par We decreased her dexamethasone to 16 mg po weekly due to psychosis. She now has mild ankle swelling in bilateral extremities. She now has urinary incontinence. Has bilateral hip pains in morning that improve with ambulation.  She started this cycle 2 on Teusday\par \par 2/15/18\par She is here for follow up. Her Neurologist in [facility] found a CT scan from [facility] back that also had hydrocephalus thus Alziemers is the cause of her dementia. They were seen in [facility] and VRD was agreed with although their dosing recommendations was to move it to q28 days. We decided to keep things as is since they are use to this schedule. She small  blisters on eye lids small under 5 mm  one in each lower eye lid. They are not painful.  She is getting out of chair with ease. No other complaints> She also has repeat CT scan of head as per her neurologist results are bellow.\par \par 3/9/18\par She is here for follow up. She is dong well. She is here with her daughter. Trazodone is helping. No pains. He rash in the eyes has resolved they did not see optho.\par \par \par 3/28/18\par She is here for follow up with her son. She is doing well.  Son states she is physically better.  No complaints. Swelling in legs improved.\par \par 4/18/48\par She is here for follow up. She once again has Sty in her eyelids. It does not botehr her. she is doing well otherwise she has no pain.

## 2018-04-18 NOTE — ASSESSMENT
[FreeTextEntry1] : IgG Lamda Multiple Myeloma with lytic bone lesions, anemia and hypercalcemia  on presentation, normal LDH,  Beta 2 7.2. Repeat levels were as follows , Albumin was 4.0. repeat Beta 2- 4.8\par  FISH- hyperploidy and gain of CCN1 which puts her at  standard risk. \par   PET/CT multiple FDG avid lytic lesion consistent with myeloma.\par -- On VRd. So far has been tolerating well and is stable. No more bony pains. Gait is stable.  We lowered her dose to 16 mg weekly of dexa \par \par \par Plan:\par -will continue with   cycle 7  VRD Velcade 1.3 mg/m2 SC weekly, Dexa 16 mg weekly ( we like to avoid psychosis) and Revlimid 15 mg  daily 2 weeks on 1 week off. No plan to increase the Revlimid dose for now. Her myeloma is responding with fall in Mspiek and Lamda contineus\par -Zometa 3.5 mg now GFR is better monthly \par -continue calcium and Vitamin D\par -on ASA but was not taking it she will restart\par --on Acyclovir 400 mg BID\par -will check Myeloma panel   and t CMP today \par -will start Maintenance Revla or velcade  after about ` 8 months of treatment once we see optimum response,  \par -she has dry eyes and sty, she will see an optho we deiced to c.w Revlimid unclear if its the cause and I recommended she starts artificial eye drops\par Pathologic Right Humerus fracture s/p fixation - Dr. Glenn Salter\par -on physical therapy\par \par \par Dementia - most likely Alzheimers \par -Neurology follow up Dr. 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

## 2018-04-23 LAB
ALBUMIN MFR SERPL ELPH: 66.2 %
ALBUMIN SERPL-MCNC: 4.7 G/DL
ALBUMIN/GLOB SERPL: 2 RATIO
ALPHA1 GLOB MFR SERPL ELPH: 4.5 %
ALPHA1 GLOB SERPL ELPH-MCNC: 0.3 G/DL
ALPHA2 GLOB MFR SERPL ELPH: 7 %
ALPHA2 GLOB SERPL ELPH-MCNC: 0.5 G/DL
B-GLOBULIN MFR SERPL ELPH: 11.4 %
B-GLOBULIN SERPL ELPH-MCNC: 0.8 G/DL
DEPRECATED KAPPA LC FREE/LAMBDA SER: 0.42 RATIO
GAMMA GLOB FLD ELPH-MCNC: 0.8 G/DL
GAMMA GLOB MFR SERPL ELPH: 10.9 %
INTERPRETATION SERPL IEP-IMP: NORMAL
KAPPA LC CSF-MCNC: 2.68 MG/DL
KAPPA LC SERPL-MCNC: 1.12 MG/DL
M PROTEIN MFR SERPL ELPH: 6 %
MONOCLON BAND OBS SERPL: 0.4 G/DL
PROT SERPL-MCNC: 7.1 G/DL
PROT SERPL-MCNC: 7.1 G/DL

## 2018-04-25 ENCOUNTER — APPOINTMENT (OUTPATIENT)
Dept: INFUSION THERAPY | Facility: CLINIC | Age: 73
End: 2018-04-25

## 2018-04-25 ENCOUNTER — LABORATORY RESULT (OUTPATIENT)
Age: 73
End: 2018-04-25

## 2018-04-25 LAB
HCT VFR BLD CALC: 30.6 %
HGB BLD-MCNC: 10.8 G/DL
MCHC RBC-ENTMCNC: 33.1 PG
MCHC RBC-ENTMCNC: 35.3 G/DL
MCV RBC AUTO: 93.9 FL
PLATELET # BLD AUTO: 216 K/UL
PMV BLD: 10.5 FL
RBC # BLD: 3.26 M/UL
RBC # FLD: 14.7 %
WBC # FLD AUTO: 5.23 K/UL

## 2018-04-25 RX ORDER — BORTEZOMIB 2.5 MG/1
2.2 INJECTION INTRAVENOUS ONCE
Qty: 0 | Refills: 0 | Status: COMPLETED | OUTPATIENT
Start: 2018-04-25 | End: 2018-04-25

## 2018-04-25 RX ORDER — ZOLEDRONIC ACID 5 MG/100ML
3.5 INJECTION, SOLUTION INTRAVENOUS ONCE
Qty: 0 | Refills: 0 | Status: COMPLETED | OUTPATIENT
Start: 2018-04-25 | End: 2018-04-25

## 2018-04-25 RX ADMIN — BORTEZOMIB 2.2 MILLIGRAM(S): 2.5 INJECTION INTRAVENOUS at 13:27

## 2018-04-25 RX ADMIN — ZOLEDRONIC ACID 200 MILLIGRAM(S): 5 INJECTION, SOLUTION INTRAVENOUS at 13:25

## 2018-04-30 ENCOUNTER — RX RENEWAL (OUTPATIENT)
Age: 73
End: 2018-04-30

## 2018-05-02 ENCOUNTER — LABORATORY RESULT (OUTPATIENT)
Age: 73
End: 2018-05-02

## 2018-05-02 ENCOUNTER — APPOINTMENT (OUTPATIENT)
Dept: INFUSION THERAPY | Facility: CLINIC | Age: 73
End: 2018-05-02

## 2018-05-02 LAB
HCT VFR BLD CALC: 30.2 %
HGB BLD-MCNC: 10.6 G/DL
MCHC RBC-ENTMCNC: 33.3 PG
MCHC RBC-ENTMCNC: 35.1 G/DL
MCV RBC AUTO: 95 FL
PLATELET # BLD AUTO: 181 K/UL
PMV BLD: 10.6 FL
RBC # BLD: 3.18 M/UL
RBC # FLD: 14.8 %
WBC # FLD AUTO: 4.72 K/UL

## 2018-05-02 RX ORDER — BORTEZOMIB 2.5 MG/1
2.2 INJECTION INTRAVENOUS ONCE
Qty: 0 | Refills: 0 | Status: COMPLETED | OUTPATIENT
Start: 2018-05-02 | End: 2018-05-02

## 2018-05-02 RX ADMIN — BORTEZOMIB 2.2 MILLIGRAM(S): 2.5 INJECTION INTRAVENOUS at 11:36

## 2018-05-10 ENCOUNTER — APPOINTMENT (OUTPATIENT)
Dept: INFUSION THERAPY | Facility: CLINIC | Age: 73
End: 2018-05-10

## 2018-05-10 ENCOUNTER — APPOINTMENT (OUTPATIENT)
Dept: HEMATOLOGY ONCOLOGY | Facility: CLINIC | Age: 73
End: 2018-05-10

## 2018-05-10 ENCOUNTER — LABORATORY RESULT (OUTPATIENT)
Age: 73
End: 2018-05-10

## 2018-05-10 VITALS
WEIGHT: 153 LBS | SYSTOLIC BLOOD PRESSURE: 140 MMHG | HEIGHT: 65 IN | TEMPERATURE: 96.5 F | RESPIRATION RATE: 14 BRPM | HEART RATE: 86 BPM | DIASTOLIC BLOOD PRESSURE: 77 MMHG | BODY MASS INDEX: 25.49 KG/M2

## 2018-05-10 RX ORDER — BORTEZOMIB 2.5 MG/1
2.2 INJECTION INTRAVENOUS ONCE
Qty: 0 | Refills: 0 | Status: COMPLETED | OUTPATIENT
Start: 2018-05-10 | End: 2018-05-10

## 2018-05-10 RX ADMIN — BORTEZOMIB 2.2 MILLIGRAM(S): 2.5 INJECTION INTRAVENOUS at 11:46

## 2018-05-10 NOTE — HISTORY OF PRESENT ILLNESS
[Disease:__________________________] : Disease: [unfilled] [de-identified] : This is a 72 year old female with history of sick sinus syndrome s/p PPM and as well as progressive dementia over 1.5 years was to see a specialist in [facility] who presented to [facility] due to a fall  as a result of  worsening gait on 11/23/17.   Her work up demonstrated , left tibial swelling, Acute displaced fracture of the distal humeral diaphysis.\par Multiple lytic bone lesions.  CT neck Multiple lytic lesions,  CT head At least mild to moderate hydrocephalus involving lateral, third and fourth ventricles. Innumerable lytic lesions.\par \par She was seen by Neurology who recommended outpatient LP and  Orhto casted the right humerus fracture and recommended Oncologic Ortho evaluation.  \par \par On admission she was noted to be Hypercalcemic and in ADRY. She received pamidronate 60 mg once with IVF and her hypercalcemia  and ADRY  resolved.\par \par Work up demonstrated lambda 352, kappa 8.5 with ration of 0.02,  SPEP M spike of 2.7 , Beta 2 7.2. Urine Total protein of  284 H  Albumin was 4.0\par \par She underwent a bone marrow biopsy on 12/1/17 that showed  flow cytometry performed at [facility]    Oncology report - Clonal (IgG-Lambda) plasma cells (28.2% of total), normocellular to mildly hypercellular (30-40%)    with a sheet of plasma cells, a large aggregate of plasma cells and    markedly increased interstitial plasma cells.  Megakaryocytes are    present.  Maturing myeloid and erythroid precursors are present.  Amyloid    deposition is not identified.  Special stains for Amyloid (Crystal Violet    and Congo Red) are negative.  Stainable iron is present (1-2+).  \par \par She also had a left lower extremity hematoma evacuation.\par \par She is here with her kids [Therapy: ___] : Therapy: [unfilled] [Cycle: ___] : Cycle: [unfilled] [Day: ___] : Day: [unfilled] [FreeTextEntry1] : VD was started  initially while we waited for R. On cycle 2 with R [de-identified] : 01/09/2018\par  Patient is here today with her family members for a follow up visit. So far got 4 doses of velcade and is currently on Day 8 of revlimid.  As per family, they noticed that her dementia is more worse  after starting VRD. Also reports Insomnia. She was evaluated by neurologist and so far, the  reason for her dementia is not established. Decision on Lumbar puncture is not yet made as neurologist did not think her symptoms were related to Normal pressure hydrocephalus.\par  ALso complaints of insomnia since being on VRD, likely from steriods. Her right arm has been healing well since the surgery and she will begin physical therapy soon. \par PET/CT results reviewed. Noted to have multiple FDG avid lytic lesions including right anterior second rib with SUV of 9.5.\par \par 1/25/18 \par She is here for follow up for her myeloma. Her  lambda started to fall and mspike went down as well she was only on Revlimid for 8 days,  Her GFR is also improving now 71.\par We decreased her dexamethasone to 16 mg po weekly due to psychosis. She now has mild ankle swelling in bilateral extremities. She now has urinary incontinence. Has bilateral hip pains in morning that improve with ambulation.  She started this cycle 2 on Teusday\par \par 2/15/18\par She is here for follow up. Her Neurologist in [facility] found a CT scan from [facility] back that also had hydrocephalus thus Alziemers is the cause of her dementia. They were seen in [facility] and VRD was agreed with although their dosing recommendations was to move it to q28 days. We decided to keep things as is since they are use to this schedule. She small  blisters on eye lids small under 5 mm  one in each lower eye lid. They are not painful.  She is getting out of chair with ease. No other complaints> She also has repeat CT scan of head as per her neurologist results are bellow.\par \par 3/9/18\par She is here for follow up. She is dong well. She is here with her daughter. Trazodone is helping. No pains. He rash in the eyes has resolved they did not see optho.\par \par \par 3/28/18\par She is here for follow up with her son. She is doing well.  Son states she is physically better.  No complaints. Swelling in legs improved.\par \par 4/18/48\par She is here for follow up. She once again has Sty in her eyelids. It does not botehr her. she is doing well otherwise she has no pain.\par \par 5/10/18\par She is here for follow up with her daughter. HEr eyes are better using a wash. DId not see optho, Now on SSRI and Quatione?. Her light chain and Mspike keeps coming down

## 2018-05-10 NOTE — PHYSICAL EXAM
[Restricted in physically strenuous activity but ambulatory and able to carry out work of a light or sedentary nature] : Status 1- Restricted in physically strenuous activity but ambulatory and able to carry out work of a light or sedentary nature, e.g., light house work, office work [Normal] : normal appearance, no rash, nodules, vesicles, ulcers, erythema [de-identified] : PPM [de-identified] : MIld edema in Lower extremities on daily lasix [de-identified] :  right arm scar [de-identified] : dementia, incontinence, ambulation difficulties but stable

## 2018-05-10 NOTE — REVIEW OF SYSTEMS
[Fatigue] : fatigue [Recent Change In Weight] : ~T no recent weight change [Dry Eyes] : dryness of the eyes [Lower Ext Edema] : lower extremity edema [Incontinence] : incontinence [Joint Pain] : no joint pain [Muscle Pain] : no muscle pain [Confused] : confusion [Difficulty Walking] : difficulty walking [Negative] : Heme/Lymph [FreeTextEntry3] : Alina  [de-identified] : Dementia

## 2018-05-11 LAB
ALBUMIN SERPL ELPH-MCNC: 4.4 G/DL
ALP BLD-CCNC: 43 U/L
ALT SERPL-CCNC: 9 U/L
ANION GAP SERPL CALC-SCNC: 20 MMOL/L
AST SERPL-CCNC: 12 U/L
BILIRUB SERPL-MCNC: 0.3 MG/DL
BUN SERPL-MCNC: 21 MG/DL
CALCIUM SERPL-MCNC: 9.2 MG/DL
CHLORIDE SERPL-SCNC: 102 MMOL/L
CO2 SERPL-SCNC: 22 MMOL/L
CREAT SERPL-MCNC: 1 MG/DL
GLUCOSE SERPL-MCNC: 71 MG/DL
HCT VFR BLD CALC: 28.6 %
HGB BLD-MCNC: 10.2 G/DL
MCHC RBC-ENTMCNC: 33.2 PG
MCHC RBC-ENTMCNC: 35.7 G/DL
MCV RBC AUTO: 93.2 FL
PLATELET # BLD AUTO: 180 K/UL
PMV BLD: 9.7 FL
POTASSIUM SERPL-SCNC: 3.9 MMOL/L
PROT SERPL-MCNC: 6.5 G/DL
RBC # BLD: 3.07 M/UL
RBC # FLD: 15.1 %
SODIUM SERPL-SCNC: 144 MMOL/L
WBC # FLD AUTO: 5.37 K/UL

## 2018-05-14 LAB
DEPRECATED KAPPA LC FREE/LAMBDA SER: 0.47 RATIO
KAPPA LC CSF-MCNC: 2.47 MG/DL
KAPPA LC SERPL-MCNC: 1.17 MG/DL
M PROTEIN SPEC IFE-MCNC: NORMAL

## 2018-05-16 ENCOUNTER — APPOINTMENT (OUTPATIENT)
Dept: INFUSION THERAPY | Facility: CLINIC | Age: 73
End: 2018-05-16

## 2018-05-16 ENCOUNTER — LABORATORY RESULT (OUTPATIENT)
Age: 73
End: 2018-05-16

## 2018-05-16 LAB
ALBUMIN SERPL ELPH-MCNC: 4.5 G/DL
ALP BLD-CCNC: 39 U/L
ALT SERPL-CCNC: 10 U/L
ANION GAP SERPL CALC-SCNC: 16 MMOL/L
AST SERPL-CCNC: 15 U/L
BILIRUB SERPL-MCNC: 0.5 MG/DL
BUN SERPL-MCNC: 22 MG/DL
CALCIUM SERPL-MCNC: 9.3 MG/DL
CHLORIDE SERPL-SCNC: 102 MMOL/L
CO2 SERPL-SCNC: 24 MMOL/L
CREAT SERPL-MCNC: 1.1 MG/DL
GLUCOSE SERPL-MCNC: 83 MG/DL
HCT VFR BLD CALC: 30.4 %
HGB BLD-MCNC: 10.4 G/DL
MCHC RBC-ENTMCNC: 33.1 PG
MCHC RBC-ENTMCNC: 34.2 G/DL
MCV RBC AUTO: 96.8 FL
PLATELET # BLD AUTO: 199 K/UL
PMV BLD: 10.2 FL
POTASSIUM SERPL-SCNC: 4 MMOL/L
PROT SERPL-MCNC: 6.4 G/DL
RBC # BLD: 3.14 M/UL
RBC # FLD: 15.6 %
SODIUM SERPL-SCNC: 142 MMOL/L
WBC # FLD AUTO: 4.18 K/UL

## 2018-05-16 RX ORDER — BORTEZOMIB 2.5 MG/1
2.2 INJECTION INTRAVENOUS ONCE
Qty: 0 | Refills: 0 | Status: COMPLETED | OUTPATIENT
Start: 2018-05-16 | End: 2018-05-16

## 2018-05-16 RX ADMIN — BORTEZOMIB 2.2 MILLIGRAM(S): 2.5 INJECTION INTRAVENOUS at 13:00

## 2018-05-23 ENCOUNTER — APPOINTMENT (OUTPATIENT)
Dept: INFUSION THERAPY | Facility: CLINIC | Age: 73
End: 2018-05-23

## 2018-05-23 ENCOUNTER — LABORATORY RESULT (OUTPATIENT)
Age: 73
End: 2018-05-23

## 2018-05-23 LAB
HCT VFR BLD CALC: 30.8 %
HGB BLD-MCNC: 11 G/DL
MCHC RBC-ENTMCNC: 34.3 PG
MCHC RBC-ENTMCNC: 35.7 G/DL
MCV RBC AUTO: 96 FL
PLATELET # BLD AUTO: 168 K/UL
PMV BLD: 10.9 FL
RBC # BLD: 3.21 M/UL
RBC # FLD: 14.8 %
WBC # FLD AUTO: 4.88 K/UL

## 2018-05-24 ENCOUNTER — APPOINTMENT (OUTPATIENT)
Dept: INFUSION THERAPY | Facility: CLINIC | Age: 73
End: 2018-05-24

## 2018-05-24 RX ORDER — FOSAPREPITANT DIMEGLUMINE 150 MG/5ML
150 INJECTION, POWDER, LYOPHILIZED, FOR SOLUTION INTRAVENOUS ONCE
Qty: 0 | Refills: 0 | Status: DISCONTINUED | OUTPATIENT
Start: 2018-05-24 | End: 2018-05-24

## 2018-05-24 RX ORDER — ZOLEDRONIC ACID 5 MG/100ML
4 INJECTION, SOLUTION INTRAVENOUS ONCE
Qty: 0 | Refills: 0 | Status: COMPLETED | OUTPATIENT
Start: 2018-05-24 | End: 2018-05-24

## 2018-05-24 RX ORDER — BORTEZOMIB 2.5 MG/1
2.2 INJECTION INTRAVENOUS ONCE
Qty: 0 | Refills: 0 | Status: COMPLETED | OUTPATIENT
Start: 2018-05-24 | End: 2018-05-24

## 2018-05-24 RX ADMIN — BORTEZOMIB 2.2 MILLIGRAM(S): 2.5 INJECTION INTRAVENOUS at 16:17

## 2018-05-24 RX ADMIN — ZOLEDRONIC ACID 200 MILLIGRAM(S): 5 INJECTION, SOLUTION INTRAVENOUS at 16:16

## 2018-05-30 ENCOUNTER — APPOINTMENT (OUTPATIENT)
Dept: HEMATOLOGY ONCOLOGY | Facility: CLINIC | Age: 73
End: 2018-05-30

## 2018-05-30 ENCOUNTER — LABORATORY RESULT (OUTPATIENT)
Age: 73
End: 2018-05-30

## 2018-05-30 ENCOUNTER — APPOINTMENT (OUTPATIENT)
Dept: INFUSION THERAPY | Facility: CLINIC | Age: 73
End: 2018-05-30

## 2018-05-30 ENCOUNTER — OUTPATIENT (OUTPATIENT)
Dept: OUTPATIENT SERVICES | Facility: HOSPITAL | Age: 73
LOS: 1 days | Discharge: HOME | End: 2018-05-30

## 2018-05-30 VITALS
SYSTOLIC BLOOD PRESSURE: 138 MMHG | RESPIRATION RATE: 14 BRPM | BODY MASS INDEX: 25.49 KG/M2 | WEIGHT: 153 LBS | DIASTOLIC BLOOD PRESSURE: 65 MMHG | HEART RATE: 87 BPM | TEMPERATURE: 96.3 F | HEIGHT: 65 IN

## 2018-05-30 DIAGNOSIS — Z86.39 PERSONAL HISTORY OF OTHER ENDOCRINE, NUTRITIONAL AND METABOLIC DISEASE: ICD-10-CM

## 2018-05-30 DIAGNOSIS — E83.52 HYPERCALCEMIA: ICD-10-CM

## 2018-05-30 DIAGNOSIS — M84.421A PATHOLOGICAL FRACTURE, RIGHT HUMERUS, INITIAL ENCOUNTER FOR FRACTURE: ICD-10-CM

## 2018-05-30 DIAGNOSIS — C90.00 MULTIPLE MYELOMA NOT HAVING ACHIEVED REMISSION: ICD-10-CM

## 2018-05-30 RX ORDER — BORTEZOMIB 2.5 MG/1
2.2 INJECTION INTRAVENOUS ONCE
Qty: 0 | Refills: 0 | Status: COMPLETED | OUTPATIENT
Start: 2018-05-30 | End: 2018-05-30

## 2018-05-30 RX ADMIN — BORTEZOMIB 2.2 MILLIGRAM(S): 2.5 INJECTION INTRAVENOUS at 13:07

## 2018-05-30 NOTE — PHYSICAL EXAM
[Restricted in physically strenuous activity but ambulatory and able to carry out work of a light or sedentary nature] : Status 1- Restricted in physically strenuous activity but ambulatory and able to carry out work of a light or sedentary nature, e.g., light house work, office work [Normal] : normal appearance, no rash, nodules, vesicles, ulcers, erythema [de-identified] : Sty still present it goes away when she is off revlamid [de-identified] : PPM [de-identified] : MIld edema in Lower extremities on daily Lasix [de-identified] :  right arm scar [de-identified] : dementia, incontinence, ambulation difficulties but stable

## 2018-05-30 NOTE — HISTORY OF PRESENT ILLNESS
[Disease:__________________________] : Disease: [unfilled] [de-identified] : This is a 72 year old female with history of sick sinus syndrome s/p PPM and as well as progressive dementia over 1.5 years was to see a specialist in [facility] who presented to [facility] due to a fall  as a result of  worsening gait on 11/23/17.   Her work up demonstrated , left tibial swelling, Acute displaced fracture of the distal humeral diaphysis.\par Multiple lytic bone lesions.  CT neck Multiple lytic lesions,  CT head At least mild to moderate hydrocephalus involving lateral, third and fourth ventricles. Innumerable lytic lesions.\par \par She was seen by Neurology who recommended outpatient LP and  Orhto casted the right humerus fracture and recommended Oncologic Ortho evaluation.  \par \par On admission she was noted to be Hypercalcemic and in ADRY. She received pamidronate 60 mg once with IVF and her hypercalcemia  and ADRY  resolved.\par \par Work up demonstrated lambda 352, kappa 8.5 with ration of 0.02,  SPEP M spike of 2.7 , Beta 2 7.2. Urine Total protein of  284 H  Albumin was 4.0\par \par She underwent a bone marrow biopsy on 12/1/17 that showed  flow cytometry performed at [facility]    Oncology report - Clonal (IgG-Lambda) plasma cells (28.2% of total), normocellular to mildly hypercellular (30-40%)    with a sheet of plasma cells, a large aggregate of plasma cells and    markedly increased interstitial plasma cells.  Megakaryocytes are    present.  Maturing myeloid and erythroid precursors are present.  Amyloid    deposition is not identified.  Special stains for Amyloid (Crystal Violet    and Congo Red) are negative.  Stainable iron is present (1-2+).  \par \par She also had a left lower extremity hematoma evacuation.\par \par She is here with her kids [Therapy: ___] : Therapy: [unfilled] [Cycle: ___] : Cycle: [unfilled] [Day: ___] : Day: [unfilled] [FreeTextEntry1] : VD was started  initially while we waited for R. On cycle 2 with R [de-identified] : 01/09/2018\par  Patient is here today with her family members for a follow up visit. So far got 4 doses of velcade and is currently on Day 8 of revlimid.  As per family, they noticed that her dementia is more worse  after starting VRD. Also reports Insomnia. She was evaluated by neurologist and so far, the  reason for her dementia is not established. Decision on Lumbar puncture is not yet made as neurologist did not think her symptoms were related to Normal pressure hydrocephalus.\par  ALso complaints of insomnia since being on VRD, likely from steriods. Her right arm has been healing well since the surgery and she will begin physical therapy soon. \par PET/CT results reviewed. Noted to have multiple FDG avid lytic lesions including right anterior second rib with SUV of 9.5.\par \par 1/25/18 \par She is here for follow up for her myeloma. Her  lambda started to fall and mspike went down as well she was only on Revlimid for 8 days,  Her GFR is also improving now 71.\par We decreased her dexamethasone to 16 mg po weekly due to psychosis. She now has mild ankle swelling in bilateral extremities. She now has urinary incontinence. Has bilateral hip pains in morning that improve with ambulation.  She started this cycle 2 on Teusday\par \par 2/15/18\par She is here for follow up. Her Neurologist in [facility] found a CT scan from [facility] back that also had hydrocephalus thus Alziemers is the cause of her dementia. They were seen in [facility] and VRD was agreed with although their dosing recommendations was to move it to q28 days. We decided to keep things as is since they are use to this schedule. She small  blisters on eye lids small under 5 mm  one in each lower eye lid. They are not painful.  She is getting out of chair with ease. No other complaints> She also has repeat CT scan of head as per her neurologist results are bellow.\par \par 3/9/18\par She is here for follow up. She is dong well. She is here with her daughter. Trazodone is helping. No pains. He rash in the eyes has resolved they did not see optho.\par \par \par 3/28/18\par She is here for follow up with her son. She is doing well.  Son states she is physically better.  No complaints. Swelling in legs improved.\par \par 4/18/48\par She is here for follow up. She once again has Sty in her eyelids. It does not botehr her. she is doing well otherwise she has no pain.\par \par 5/10/18\par She is here for follow up with her daughter. HEr eyes are better using a wash. DId not see optho, Now on SSRI and Quatione?. Her light chain and Mspike keeps coming down\par \par 5/30/18 \par \par She is here for follow up. She is doing well. Her LIght chain is not normal. still had positive ROSALIND.  She started the Revlimid 3 days ago.

## 2018-05-30 NOTE — ASSESSMENT
[FreeTextEntry1] : IgG Lamda Multiple Myeloma with lytic bone lesions, anemia and hypercalcemia  on presentation, normal LDH,  Beta 2 7.2. Repeat levels were as follows , Albumin was 4.0. repeat Beta 2- 4.8\par  FISH- hyperploidy and gain of CCN1 which puts her at  standard risk. \par   PET/CT multiple FDG avid lytic lesion consistent with myeloma.\par -- On VRd. So far has been tolerating well and is stable. No more bony pains. Gait is stable.  We lowered her dose to 12 mg weekly of dexa now since she gets agitated on  16 mg\par \par \par Plan:\par -will continue with   cycle 8  VRD Velcade 1.3 mg/m2 SC weekly, Dexa 12  mg weekly  now  and Revlimid 15 mg  daily 2 weeks on 1 week off. . Her myeloma is responding she achieved at least  VFPR or a CR if ROSALIND turns negative,  will check myeloma panel and will start maintenance Velcade with next cycle given her Dementia \par -Zometa 3.5 mg now GFR is better monthly \par -continue calcium and Vitamin D\par -on ASA 81 mg\par --on Acyclovir 400 mg BID\par -will check Myeloma panel   and  CMP today \par -she has dry eyes and sty, that is better with eye wash\par \par Pathologic Right Humerus fracture s/p fixation - Dr. Glenn Salter\par -on physical therapy\par -will possibly repeat imaging \par \par \par Dementia - most likely Alzheimers \par -Neurology follow up Dr. Fields.\par -stable on SSRI now na Seraquil? daughter will bring in meds next visit\par -will check CT Brain around June July as requested\par \par \par Anemia due to MM  and or treatment \par -stable\par \par Hypercalcemia from MM\par -resolved \par \par Edema\par -most likely from Revlimid mild\par -she is now on Lasix 20 mg daily \par \par \par RTC  3 weeks

## 2018-05-30 NOTE — REVIEW OF SYSTEMS
[Fatigue] : fatigue [Recent Change In Weight] : ~T no recent weight change [Dry Eyes] : dryness of the eyes [Lower Ext Edema] : lower extremity edema [Incontinence] : incontinence [Joint Pain] : no joint pain [Muscle Pain] : no muscle pain [Confused] : confusion [Difficulty Walking] : difficulty walking [Negative] : Heme/Lymph [FreeTextEntry3] : Alina  [de-identified] : Dementia

## 2018-05-31 LAB
ALBUMIN MFR SERPL ELPH: 66.4 %
ALBUMIN SERPL ELPH-MCNC: 4.1 G/DL
ALBUMIN SERPL-MCNC: 4.1 G/DL
ALBUMIN/GLOB SERPL: 2 RATIO
ALP BLD-CCNC: 37 U/L
ALPHA1 GLOB MFR SERPL ELPH: 4.8 %
ALPHA1 GLOB SERPL ELPH-MCNC: 0.3 G/DL
ALPHA2 GLOB MFR SERPL ELPH: 6.8 %
ALPHA2 GLOB SERPL ELPH-MCNC: 0.4 G/DL
ALT SERPL-CCNC: 9 U/L
ANION GAP SERPL CALC-SCNC: 17 MMOL/L
AST SERPL-CCNC: 13 U/L
B-GLOBULIN MFR SERPL ELPH: 12 %
B-GLOBULIN SERPL ELPH-MCNC: 0.7 G/DL
BILIRUB SERPL-MCNC: 0.4 MG/DL
BUN SERPL-MCNC: 21 MG/DL
CALCIUM SERPL-MCNC: 8.6 MG/DL
CHLORIDE SERPL-SCNC: 103 MMOL/L
CO2 SERPL-SCNC: 23 MMOL/L
CREAT SERPL-MCNC: 1.2 MG/DL
GAMMA GLOB FLD ELPH-MCNC: 0.6 G/DL
GAMMA GLOB MFR SERPL ELPH: 10 %
GLUCOSE SERPL-MCNC: 55 MG/DL
HCT VFR BLD CALC: 29 %
HGB BLD-MCNC: 10.2 G/DL
INTERPRETATION SERPL IEP-IMP: NORMAL
M PROTEIN MFR SERPL ELPH: 4.1 %
MCHC RBC-ENTMCNC: 34.2 PG
MCHC RBC-ENTMCNC: 35.2 G/DL
MCV RBC AUTO: 97.3 FL
MONOCLON BAND OBS SERPL: 0.3 G/DL
PLATELET # BLD AUTO: 168 K/UL
PMV BLD: 9.5 FL
POTASSIUM SERPL-SCNC: 4.2 MMOL/L
PROT SERPL-MCNC: 6.1 G/DL
PROT SERPL-MCNC: 6.1 G/DL
RBC # BLD: 2.98 M/UL
RBC # FLD: 15.2 %
SODIUM SERPL-SCNC: 143 MMOL/L
WBC # FLD AUTO: 4.37 K/UL

## 2018-06-01 LAB
DEPRECATED KAPPA LC FREE/LAMBDA SER: 0.62 RATIO
KAPPA LC CSF-MCNC: 1.64 MG/DL
KAPPA LC SERPL-MCNC: 1.02 MG/DL

## 2018-06-06 ENCOUNTER — APPOINTMENT (OUTPATIENT)
Dept: INFUSION THERAPY | Facility: CLINIC | Age: 73
End: 2018-06-06

## 2018-06-06 ENCOUNTER — LABORATORY RESULT (OUTPATIENT)
Age: 73
End: 2018-06-06

## 2018-06-06 LAB
HCT VFR BLD CALC: 29.1 %
HGB BLD-MCNC: 10.3 G/DL
MCHC RBC-ENTMCNC: 34 PG
MCHC RBC-ENTMCNC: 35.4 G/DL
MCV RBC AUTO: 96 FL
PLATELET # BLD AUTO: 192 K/UL
PMV BLD: 10.7 FL
RBC # BLD: 3.03 M/UL
RBC # FLD: 15.2 %
WBC # FLD AUTO: 4.53 K/UL

## 2018-06-06 RX ORDER — BORTEZOMIB 2.5 MG/1
2.2 INJECTION INTRAVENOUS ONCE
Qty: 0 | Refills: 0 | Status: COMPLETED | OUTPATIENT
Start: 2018-06-06 | End: 2018-06-06

## 2018-06-06 RX ADMIN — BORTEZOMIB 2.2 MILLIGRAM(S): 2.5 INJECTION INTRAVENOUS at 12:25

## 2018-06-13 ENCOUNTER — LABORATORY RESULT (OUTPATIENT)
Age: 73
End: 2018-06-13

## 2018-06-13 ENCOUNTER — RX RENEWAL (OUTPATIENT)
Age: 73
End: 2018-06-13

## 2018-06-13 ENCOUNTER — APPOINTMENT (OUTPATIENT)
Dept: INFUSION THERAPY | Facility: CLINIC | Age: 73
End: 2018-06-13

## 2018-06-13 LAB
ANION GAP SERPL CALC-SCNC: 21 MMOL/L
BUN SERPL-MCNC: 19 MG/DL
CALCIUM SERPL-MCNC: 8.6 MG/DL
CHLORIDE SERPL-SCNC: 104 MMOL/L
CO2 SERPL-SCNC: 18 MMOL/L
CREAT SERPL-MCNC: 1 MG/DL
GLUCOSE SERPL-MCNC: 87 MG/DL
HCT VFR BLD CALC: 29.8 %
HGB BLD-MCNC: 10.5 G/DL
MCHC RBC-ENTMCNC: 34.1 PG
MCHC RBC-ENTMCNC: 35.2 G/DL
MCV RBC AUTO: 96.8 FL
PLATELET # BLD AUTO: 156 K/UL
PMV BLD: 10.5 FL
POTASSIUM SERPL-SCNC: 4 MMOL/L
RBC # BLD: 3.08 M/UL
RBC # FLD: 14.8 %
SODIUM SERPL-SCNC: 143 MMOL/L
WBC # FLD AUTO: 5.14 K/UL

## 2018-06-13 RX ORDER — BORTEZOMIB 2.5 MG/1
2.2 INJECTION INTRAVENOUS ONCE
Qty: 0 | Refills: 0 | Status: COMPLETED | OUTPATIENT
Start: 2018-06-13 | End: 2018-06-13

## 2018-06-13 RX ADMIN — BORTEZOMIB 2.2 MILLIGRAM(S): 2.5 INJECTION INTRAVENOUS at 11:11

## 2018-06-20 ENCOUNTER — APPOINTMENT (OUTPATIENT)
Dept: INFUSION THERAPY | Facility: CLINIC | Age: 73
End: 2018-06-20

## 2018-06-20 ENCOUNTER — APPOINTMENT (OUTPATIENT)
Dept: HEMATOLOGY ONCOLOGY | Facility: CLINIC | Age: 73
End: 2018-06-20

## 2018-06-20 VITALS
HEIGHT: 65 IN | WEIGHT: 153 LBS | TEMPERATURE: 97.6 F | RESPIRATION RATE: 14 BRPM | DIASTOLIC BLOOD PRESSURE: 76 MMHG | BODY MASS INDEX: 25.49 KG/M2 | SYSTOLIC BLOOD PRESSURE: 133 MMHG | HEART RATE: 87 BPM

## 2018-06-20 LAB
ALBUMIN SERPL ELPH-MCNC: 4.5 G/DL
ALP BLD-CCNC: 39 U/L
ALT SERPL-CCNC: 11 U/L
ANION GAP SERPL CALC-SCNC: 15 MMOL/L
AST SERPL-CCNC: 17 U/L
BILIRUB SERPL-MCNC: 0.4 MG/DL
BUN SERPL-MCNC: 22 MG/DL
CALCIUM SERPL-MCNC: 8.8 MG/DL
CHLORIDE SERPL-SCNC: 103 MMOL/L
CO2 SERPL-SCNC: 25 MMOL/L
CREAT SERPL-MCNC: 1.2 MG/DL
GLUCOSE SERPL-MCNC: 90 MG/DL
POTASSIUM SERPL-SCNC: 3.8 MMOL/L
PROT SERPL-MCNC: 6.7 G/DL
SODIUM SERPL-SCNC: 143 MMOL/L

## 2018-06-20 RX ORDER — BORTEZOMIB 2.5 MG/1
2.2 INJECTION INTRAVENOUS ONCE
Qty: 0 | Refills: 0 | Status: COMPLETED | OUTPATIENT
Start: 2018-06-20 | End: 2018-06-20

## 2018-06-20 RX ORDER — ZOLEDRONIC ACID 5 MG/100ML
3.5 INJECTION, SOLUTION INTRAVENOUS ONCE
Qty: 0 | Refills: 0 | Status: COMPLETED | OUTPATIENT
Start: 2018-06-20 | End: 2018-06-20

## 2018-06-20 RX ADMIN — ZOLEDRONIC ACID 200 MILLIGRAM(S): 5 INJECTION, SOLUTION INTRAVENOUS at 15:33

## 2018-06-20 RX ADMIN — BORTEZOMIB 2.2 MILLIGRAM(S): 2.5 INJECTION INTRAVENOUS at 15:55

## 2018-06-21 LAB
ALBUMIN MFR SERPL ELPH: 61.9 %
ALBUMIN SERPL-MCNC: 4.1 G/DL
ALBUMIN/GLOB SERPL: 1.6 RATIO
ALPHA1 GLOB MFR SERPL ELPH: 5.8 %
ALPHA1 GLOB SERPL ELPH-MCNC: 0.4 G/DL
ALPHA2 GLOB MFR SERPL ELPH: 7.7 %
ALPHA2 GLOB SERPL ELPH-MCNC: 0.5 G/DL
B-GLOBULIN MFR SERPL ELPH: 12.9 %
B-GLOBULIN SERPL ELPH-MCNC: 0.9 G/DL
DEPRECATED KAPPA LC FREE/LAMBDA SER: 0.68 RATIO
GAMMA GLOB FLD ELPH-MCNC: 0.8 G/DL
GAMMA GLOB MFR SERPL ELPH: 11.7 %
INTERPRETATION SERPL IEP-IMP: NORMAL
KAPPA LC CSF-MCNC: 2.77 MG/DL
KAPPA LC SERPL-MCNC: 1.88 MG/DL
M PROTEIN MFR SERPL ELPH: 3 %
MONOCLON BAND OBS SERPL: 0.2 G/DL
PROT SERPL-MCNC: 6.7 G/DL
PROT SERPL-MCNC: 6.7 G/DL

## 2018-06-22 NOTE — HISTORY OF PRESENT ILLNESS
[Disease:__________________________] : Disease: [unfilled] [Therapy: ___] : Therapy: [unfilled] [Cycle: ___] : Cycle: [unfilled] [Day: ___] : Day: [unfilled] [de-identified] : This is a 72 year old female with history of sick sinus syndrome s/p PPM and as well as progressive dementia over 1.5 years was to see a specialist in [facility] who presented to [facility] due to a fall  as a result of  worsening gait on 11/23/17.   Her work up demonstrated , left tibial swelling, Acute displaced fracture of the distal humeral diaphysis.\par Multiple lytic bone lesions.  CT neck Multiple lytic lesions,  CT head At least mild to moderate hydrocephalus involving lateral, third and fourth ventricles. Innumerable lytic lesions.\par \par She was seen by Neurology who recommended outpatient LP and  Orhto casted the right humerus fracture and recommended Oncologic Ortho evaluation.  \par \par On admission she was noted to be Hypercalcemic and in ADRY. She received pamidronate 60 mg once with IVF and her hypercalcemia  and ADRY  resolved.\par \par Work up demonstrated lambda 352, kappa 8.5 with ration of 0.02,  SPEP M spike of 2.7 , Beta 2 7.2. Urine Total protein of  284 H  Albumin was 4.0\par \par She underwent a bone marrow biopsy on 12/1/17 that showed  flow cytometry performed at [facility]    Oncology report - Clonal (IgG-Lambda) plasma cells (28.2% of total), normocellular to mildly hypercellular (30-40%)    with a sheet of plasma cells, a large aggregate of plasma cells and    markedly increased interstitial plasma cells.  Megakaryocytes are    present.  Maturing myeloid and erythroid precursors are present.  Amyloid    deposition is not identified.  Special stains for Amyloid (Crystal Violet    and Congo Red) are negative.  Stainable iron is present (1-2+).  \par \par She also had a left lower extremity hematoma evacuation.\par \par She is here with her kids [FreeTextEntry1] : VD was started  initially while we waited for R. On cycle 2 with R [de-identified] : 01/09/2018\par  Patient is here today with her family members for a follow up visit. So far got 4 doses of velcade and is currently on Day 8 of revlimid.  As per family, they noticed that her dementia is more worse  after starting VRD. Also reports Insomnia. She was evaluated by neurologist and so far, the  reason for her dementia is not established. Decision on Lumbar puncture is not yet made as neurologist did not think her symptoms were related to Normal pressure hydrocephalus.\par  ALso complaints of insomnia since being on VRD, likely from steriods. Her right arm has been healing well since the surgery and she will begin physical therapy soon. \par PET/CT results reviewed. Noted to have multiple FDG avid lytic lesions including right anterior second rib with SUV of 9.5.\par \par 1/25/18 \par She is here for follow up for her myeloma. Her  lambda started to fall and mspike went down as well she was only on Revlimid for 8 days,  Her GFR is also improving now 71.\par We decreased her dexamethasone to 16 mg po weekly due to psychosis. She now has mild ankle swelling in bilateral extremities. She now has urinary incontinence. Has bilateral hip pains in morning that improve with ambulation.  She started this cycle 2 on Teusday\par \par 2/15/18\par She is here for follow up. Her Neurologist in [facility] found a CT scan from [facility] back that also had hydrocephalus thus Alziemers is the cause of her dementia. They were seen in [facility] and VRD was agreed with although their dosing recommendations was to move it to q28 days. We decided to keep things as is since they are use to this schedule. She small  blisters on eye lids small under 5 mm  one in each lower eye lid. They are not painful.  She is getting out of chair with ease. No other complaints> She also has repeat CT scan of head as per her neurologist results are bellow.\par \par 3/9/18\par She is here for follow up. She is dong well. She is here with her daughter. Trazodone is helping. No pains. He rash in the eyes has resolved they did not see optho.\par \par \par 3/28/18\par She is here for follow up with her son. She is doing well.  Son states she is physically better.  No complaints. Swelling in legs improved.\par \par 4/18/48\par She is here for follow up. She once again has Sty in her eyelids. It does not botehr her. she is doing well otherwise she has no pain.\par \par 5/10/18\par She is here for follow up with her daughter. HEr eyes are better using a wash. DId not see optho, Now on SSRI and Quatione?. Her light chain and Mspike keeps coming down\par \par 5/30/18 \par \par She is here for follow up. She is doing well. Her LIght chain is not normal. still had positive ROSALIND.  She started the Revlimid 3 days ago.\par \par 6/22/18\par She is here for follow up with daughter. She is having a hard time with mood swings with steroids so dexa was  stopped  it.

## 2018-06-22 NOTE — REVIEW OF SYSTEMS
[Fatigue] : fatigue [Dry Eyes] : dryness of the eyes [Lower Ext Edema] : lower extremity edema [Incontinence] : incontinence [Confused] : confusion [Difficulty Walking] : difficulty walking [Negative] : Heme/Lymph [Recent Change In Weight] : ~T no recent weight change [Joint Pain] : no joint pain [Muscle Pain] : no muscle pain [FreeTextEntry3] : Alina  [FreeTextEntry5] : Edema is a bit worse  [de-identified] : Dementia

## 2018-06-22 NOTE — ASSESSMENT
[FreeTextEntry1] : IgG Lamda Multiple Myeloma with lytic bone lesions, anemia and hypercalcemia  on presentation, normal LDH,  Beta 2 7.2. Repeat levels were as follows , Albumin was 4.0. repeat Beta 2- 4.8\par  FISH- hyperploidy and gain of CCN1 which puts her at  standard risk. \par   PET/CT multiple FDG avid lytic lesion consistent with myeloma.\par -- On VRd. She  has been tolerating well and is stable. No more bony pains. Gait is stable.  We lowered her dose to 12 mg weekly of dexa now since she gets agitated on  16 mg\par -She achieved a VGPR at this  point and having issues with Dexa now. Its hard to give her pills so we decided to proceed with Velcade maintenance \par \par \par Plan:\par -will repeat Myloma panel\par -will start Velcade maintenance every 2 weeks\par -Zometa 3.5 mg moontly will go to every 3 months after one year if no progression \par -continue calcium and Vitamin D\par -on ASA 81 mg can be stopped \par --on Acyclovir 400 mg BID\par -edema may get better now that Revlamid and dexa is off, it maybe due to norvasc as well and they will speak to PMD to possibly change this\par \par Pathologic Right Humerus fracture s/p fixation - Dr. Glenn Salter\par -on physical therapy\par -will clinically monitor \par \par \par Dementia - most likely Alzheimers \par -Neurology follow up Jessica Aguiar  fax \par -stable on SSRI now  Seraquil? d\par -will check CT Brain with contrast  as requested ordered \par \par Anemia due to MM  and or treatment \par -stable\par \par Hypercalcemia from MM\par -resolved \par \par Edema\par -most likely from Revlimid  as above was stopped, maybe due to norvasc \par -she is now on Lasix 20 mg daily, compression stocking and elevation encouraged \par \par \par RTC 4 weeks

## 2018-06-22 NOTE — PHYSICAL EXAM
[Restricted in physically strenuous activity but ambulatory and able to carry out work of a light or sedentary nature] : Status 1- Restricted in physically strenuous activity but ambulatory and able to carry out work of a light or sedentary nature, e.g., light house work, office work [Normal] : normal appearance, no rash, nodules, vesicles, ulcers, erythema [de-identified] : y has resolved  [de-identified] : PPM [de-identified] : MIld to mod  edema in Lower extremities on daily Lasix [de-identified] :  right arm scar [de-identified] : dementia, incontinence, ambulation difficulties but stable

## 2018-06-27 ENCOUNTER — APPOINTMENT (OUTPATIENT)
Dept: INFUSION THERAPY | Facility: CLINIC | Age: 73
End: 2018-06-27

## 2018-07-05 ENCOUNTER — LABORATORY RESULT (OUTPATIENT)
Age: 73
End: 2018-07-05

## 2018-07-05 ENCOUNTER — APPOINTMENT (OUTPATIENT)
Dept: INFUSION THERAPY | Facility: CLINIC | Age: 73
End: 2018-07-05

## 2018-07-05 RX ORDER — BORTEZOMIB 2.5 MG/1
2.2 INJECTION INTRAVENOUS ONCE
Qty: 0 | Refills: 0 | Status: COMPLETED | OUTPATIENT
Start: 2018-07-05 | End: 2018-07-05

## 2018-07-05 RX ADMIN — BORTEZOMIB 2.2 MILLIGRAM(S): 2.5 INJECTION INTRAVENOUS at 12:28

## 2018-07-06 LAB
ANION GAP SERPL CALC-SCNC: 17 MMOL/L
BUN SERPL-MCNC: 22 MG/DL
CALCIUM SERPL-MCNC: 9 MG/DL
CHLORIDE SERPL-SCNC: 102 MMOL/L
CO2 SERPL-SCNC: 23 MMOL/L
CREAT SERPL-MCNC: 1.1 MG/DL
GLUCOSE SERPL-MCNC: 79 MG/DL
HCT VFR BLD CALC: 32.1 %
HGB BLD-MCNC: 11.5 G/DL
MCHC RBC-ENTMCNC: 34.8 PG
MCHC RBC-ENTMCNC: 35.8 G/DL
MCV RBC AUTO: 97.3 FL
PLATELET # BLD AUTO: 219 K/UL
PMV BLD: 9.5 FL
POTASSIUM SERPL-SCNC: 3.8 MMOL/L
RBC # BLD: 3.3 M/UL
RBC # FLD: 13.7 %
SODIUM SERPL-SCNC: 142 MMOL/L
WBC # FLD AUTO: 5.15 K/UL

## 2018-07-09 ENCOUNTER — RX RENEWAL (OUTPATIENT)
Age: 73
End: 2018-07-09

## 2018-07-18 ENCOUNTER — APPOINTMENT (OUTPATIENT)
Dept: INFUSION THERAPY | Facility: CLINIC | Age: 73
End: 2018-07-18

## 2018-07-18 ENCOUNTER — LABORATORY RESULT (OUTPATIENT)
Age: 73
End: 2018-07-18

## 2018-07-18 ENCOUNTER — APPOINTMENT (OUTPATIENT)
Dept: HEMATOLOGY ONCOLOGY | Facility: CLINIC | Age: 73
End: 2018-07-18

## 2018-07-18 VITALS
BODY MASS INDEX: 24.59 KG/M2 | DIASTOLIC BLOOD PRESSURE: 83 MMHG | RESPIRATION RATE: 14 BRPM | HEART RATE: 87 BPM | SYSTOLIC BLOOD PRESSURE: 160 MMHG | WEIGHT: 153 LBS | TEMPERATURE: 96.7 F | HEIGHT: 66 IN

## 2018-07-18 LAB
HCT VFR BLD CALC: 31.1 %
HGB BLD-MCNC: 11 G/DL
MCHC RBC-ENTMCNC: 34.8 PG
MCHC RBC-ENTMCNC: 35.4 G/DL
MCV RBC AUTO: 98.4 FL
PLATELET # BLD AUTO: 263 K/UL
PMV BLD: 9.2 FL
RBC # BLD: 3.16 M/UL
RBC # FLD: 13.8 %
WBC # FLD AUTO: 5.26 K/UL

## 2018-07-18 RX ORDER — AMLODIPINE BESYLATE 5 MG/1
5 TABLET ORAL
Qty: 90 | Refills: 0 | Status: COMPLETED | COMMUNITY
Start: 2017-08-23 | End: 2018-07-18

## 2018-07-18 RX ORDER — LENALIDOMIDE 15 MG/1
15 CAPSULE ORAL
Qty: 14 | Refills: 0 | Status: COMPLETED | COMMUNITY
Start: 2017-12-05 | End: 2018-07-18

## 2018-07-18 RX ORDER — BORTEZOMIB 2.5 MG/1
2.2 INJECTION INTRAVENOUS ONCE
Qty: 0 | Refills: 0 | Status: COMPLETED | OUTPATIENT
Start: 2018-07-18 | End: 2018-07-18

## 2018-07-18 RX ORDER — ZOLEDRONIC ACID 5 MG/100ML
3.5 INJECTION, SOLUTION INTRAVENOUS ONCE
Qty: 0 | Refills: 0 | Status: COMPLETED | OUTPATIENT
Start: 2018-07-18 | End: 2018-07-18

## 2018-07-18 RX ADMIN — BORTEZOMIB 2.2 MILLIGRAM(S): 2.5 INJECTION INTRAVENOUS at 12:51

## 2018-07-18 RX ADMIN — ZOLEDRONIC ACID 200 MILLIGRAM(S): 5 INJECTION, SOLUTION INTRAVENOUS at 12:51

## 2018-07-18 NOTE — HISTORY OF PRESENT ILLNESS
[Disease:__________________________] : Disease: [unfilled] [de-identified] : This is a 72 year old female with history of sick sinus syndrome s/p PPM and as well as progressive dementia over 1.5 years was to see a specialist in [facility] who presented to [facility] due to a fall  as a result of  worsening gait on 11/23/17.   Her work up demonstrated , left tibial swelling, Acute displaced fracture of the distal humeral diaphysis.\par Multiple lytic bone lesions.  CT neck Multiple lytic lesions,  CT head At least mild to moderate hydrocephalus involving lateral, third and fourth ventricles. Innumerable lytic lesions.\par \par She was seen by Neurology who recommended outpatient LP and  Orhto casted the right humerus fracture and recommended Oncologic Ortho evaluation.  \par \par On admission she was noted to be Hypercalcemic and in ADRY. She received pamidronate 60 mg once with IVF and her hypercalcemia  and ADRY  resolved.\par \par Work up demonstrated lambda 352, kappa 8.5 with ration of 0.02,  SPEP M spike of 2.7 , Beta 2 7.2. Urine Total protein of  284 H  Albumin was 4.0\par \par She underwent a bone marrow biopsy on 12/1/17 that showed  flow cytometry performed at [facility]    Oncology report - Clonal (IgG-Lambda) plasma cells (28.2% of total), normocellular to mildly hypercellular (30-40%)    with a sheet of plasma cells, a large aggregate of plasma cells and    markedly increased interstitial plasma cells.  Megakaryocytes are    present.  Maturing myeloid and erythroid precursors are present.  Amyloid    deposition is not identified.  Special stains for Amyloid (Crystal Violet    and Congo Red) are negative.  Stainable iron is present (1-2+).  \par \par She also had a left lower extremity hematoma evacuation.\par \par She is here with her kids [Therapy: ___] : Therapy: [unfilled] [Cycle: ___] : Cycle: [unfilled] [Day: ___] : Day: [unfilled] [FreeTextEntry1] : VD was started  initially while we waited for R. On cycle 2 with R we stopped RD on  6/2018 now on maintenance Velcade. [de-identified] : 01/09/2018\par  Patient is here today with her family members for a follow up visit. So far got 4 doses of velcade and is currently on Day 8 of revlimid.  As per family, they noticed that her dementia is more worse  after starting VRD. Also reports Insomnia. She was evaluated by neurologist and so far, the  reason for her dementia is not established. Decision on Lumbar puncture is not yet made as neurologist did not think her symptoms were related to Normal pressure hydrocephalus.\par  ALso complaints of insomnia since being on VRD, likely from steriods. Her right arm has been healing well since the surgery and she will begin physical therapy soon. \par PET/CT results reviewed. Noted to have multiple FDG avid lytic lesions including right anterior second rib with SUV of 9.5.\par \par 1/25/18 \par She is here for follow up for her myeloma. Her  lambda started to fall and mspike went down as well she was only on Revlimid for 8 days,  Her GFR is also improving now 71.\par We decreased her dexamethasone to 16 mg po weekly due to psychosis. She now has mild ankle swelling in bilateral extremities. She now has urinary incontinence. Has bilateral hip pains in morning that improve with ambulation.  She started this cycle 2 on Teusday\par \par 2/15/18\par She is here for follow up. Her Neurologist in [facility] found a CT scan from [facility] back that also had hydrocephalus thus Alziemers is the cause of her dementia. They were seen in [facility] and VRD was agreed with although their dosing recommendations was to move it to q28 days. We decided to keep things as is since they are use to this schedule. She small  blisters on eye lids small under 5 mm  one in each lower eye lid. They are not painful.  She is getting out of chair with ease. No other complaints> She also has repeat CT scan of head as per her neurologist results are bellow.\par \par 3/9/18\par She is here for follow up. She is dong well. She is here with her daughter. Trazodone is helping. No pains. He rash in the eyes has resolved they did not see optho.\par \par \par 3/28/18\par She is here for follow up with her son. She is doing well.  Son states she is physically better.  No complaints. Swelling in legs improved.\par \par 4/18/48\par She is here for follow up. She once again has Sty in her eyelids. It does not botehr her. she is doing well otherwise she has no pain.\par \par 5/10/18\par She is here for follow up with her daughter. HEr eyes are better using a wash. DId not see optho, Now on SSRI and Quatione?. Her light chain and Mspike keeps coming down\par \par 5/30/18 \par \par She is here for follow up. She is doing well. Her LIght chain is not normal. still had positive ROSALIND.  She started the Revlimid 3 days ago.\par \par 6/22/18\par She is here for follow up with daughter. She is having a hard time with mood swings with steroids so dexa was  stopped  it. \par \par 7/18/18\par She is here with her daughter. She is doing well. Still has some LE swelling.  No other issues.

## 2018-07-18 NOTE — RESULTS/DATA
[FreeTextEntry1] :   PROCEDURE DATE:  02/09/2018       INTERPRETATION:  CLINICAL HISTORY / REASON FOR EXAM: Brain metastases.  Multiple myeloma. Dementia.  TECHNIQUE: Noncontrast head CT. Contiguous axial CT images were obtained  from the base of the skull to the vertex before and after the  administration of 100 cc of Optiray 320 intravenous contrast. Coronal and  sagittal reformats provided.  COMPARISON: CT head November 23, 2017.  FINDINGS: There is stable moderate ventricular prominence superimposed upon mild  parenchymal volume loss.  No evidence of acute intracranial hemorrhage. No mass effect or midline  shift.  Gray-white differentiation is maintained.    Patchy hypodensities are seen in the periventricular and subcortical  white matter, nonspecific and without mass-effect, and may represent  areas of  chronic microvascular change.  After administration of intravenous contrast there is no abnormal  parenchymal enhancement.  Visualized paranasal sinuses and mastoid air cells are well-aerated.  There has been mild interval progression of innumerable lytic lesions  throughout the calvarium and skull base.   IMPRESSION:  1.  No evidence of acute intracranial pathology. Stable exam of the brain  since 11/23/2017.  2.  Stable moderate hydrocephalus.  3.  Mild interval progression of innumerable lytic lesions within the  calvarium and skull base compatible with the patient's clinical history  of multiple myeloma.      SANDRA POP M.D., RESIDENT RADIOLOGIST This document has been electronically signed. CHCUKIE DALLAS M.D., ATTENDING RADIOLOGIST This document has been electronically signed. Feb 12 2018  2:14PM

## 2018-07-18 NOTE — REVIEW OF SYSTEMS
[Fatigue] : no fatigue [Recent Change In Weight] : ~T no recent weight change [Dry Eyes] : dryness of the eyes [Lower Ext Edema] : lower extremity edema [Incontinence] : incontinence [Joint Pain] : no joint pain [Muscle Pain] : no muscle pain [Confused] : confusion [Difficulty Walking] : difficulty walking [Negative] : Heme/Lymph [FreeTextEntry3] : Alina  [FreeTextEntry5] : Edema is a bit worse  [de-identified] : Dementia

## 2018-07-18 NOTE — ASSESSMENT
[FreeTextEntry1] : IgG Lamda Multiple Myeloma with lytic bone lesions, anemia and hypercalcemia  on presentation, normal LDH,  Beta 2 7.2. Repeat levels were as follows , Albumin was 4.0. repeat Beta 2- 4.8\par  FISH- hyperploidy and gain of CCN1 which puts her at  standard risk. \par   PET/CT multiple FDG avid lytic lesion consistent with myeloma.\par -- On VRd. She  has been tolerating well and is stable. No more bony pains. Gait is stable.  We lowered her dose to 12 mg weekly of dexa now since she gets agitated on  16 mg\par -She achieved a VGPR at this  point and having issues with Dexa now. Its hard to give her pills so now on Velcade maintenance , no neuropathy\par \par \par Plan:\par - Velcade maintenance every 2 weeks, light chain was a bit up but will monitor \par -Zometa 3.5 mg monthly will go to every 3 months after one year if no progression\par -continue calcium and Vitamin D\par -on ASA 81 mg can be stopped \par --on Acyclovir 400 mg BID\par -edema  the same on lasix, will stop Norvasc and start ARB Valsartan 40 mg daily\par -Myeloma panel sent\par \par Pathologic Right Humerus fracture s/p fixation - Dr. Glenn Salter\par -on physical therapy\par -will clinically monitor \par \par \par Dementia - most likely Alzheimers \par -Neurology follow up Jessica Aguiar  fax \par -stable on SSRI now  Seraquil? \par -will check CT Brain with contrast  as requested ordered \par \par Anemia due to MM  and or treatment \par -stable\par \par Hypercalcemia from MM\par -resolved \par \par Edema\par - maybe due to norvasc \par -she is now on Lasix 20 mg daily, compression stocking and elevation encouraged , Now on Valsartan, Norvasc was stopped\par \par \par RTC 4 weeks

## 2018-07-18 NOTE — PHYSICAL EXAM
[Restricted in physically strenuous activity but ambulatory and able to carry out work of a light or sedentary nature] : Status 1- Restricted in physically strenuous activity but ambulatory and able to carry out work of a light or sedentary nature, e.g., light house work, office work [Normal] : normal appearance, no rash, nodules, vesicles, ulcers, erythema [de-identified] : Sty has resolved , has occasional outbrakes [de-identified] : PPM [de-identified] : MIld to mod  edema in Lower extremities on daily Lasix [de-identified] :  right arm scar [de-identified] : dementia, incontinence, ambulation difficulties but stable

## 2018-07-19 LAB
ALBUMIN SERPL ELPH-MCNC: 4.6 G/DL
ALP BLD-CCNC: 43 U/L
ALT SERPL-CCNC: 9 U/L
ANION GAP SERPL CALC-SCNC: 19 MMOL/L
AST SERPL-CCNC: 22 U/L
BILIRUB SERPL-MCNC: 0.3 MG/DL
BUN SERPL-MCNC: 26 MG/DL
CALCIUM SERPL-MCNC: 8.9 MG/DL
CHLORIDE SERPL-SCNC: 98 MMOL/L
CO2 SERPL-SCNC: 22 MMOL/L
CREAT SERPL-MCNC: 1.1 MG/DL
GLUCOSE SERPL-MCNC: 77 MG/DL
POTASSIUM SERPL-SCNC: 4.1 MMOL/L
PROT SERPL-MCNC: 7.1 G/DL
SODIUM SERPL-SCNC: 139 MMOL/L

## 2018-07-24 LAB
ALBUMIN MFR SERPL ELPH: 63.2 %
ALBUMIN SERPL-MCNC: 4.5 G/DL
ALBUMIN/GLOB SERPL: 1.7 RATIO
ALPHA1 GLOB MFR SERPL ELPH: 4.5 %
ALPHA1 GLOB SERPL ELPH-MCNC: 0.3 G/DL
ALPHA2 GLOB MFR SERPL ELPH: 6.5 %
ALPHA2 GLOB SERPL ELPH-MCNC: 0.5 G/DL
B-GLOBULIN MFR SERPL ELPH: 12.4 %
B-GLOBULIN SERPL ELPH-MCNC: 0.9 G/DL
DEPRECATED KAPPA LC FREE/LAMBDA SER: 0.56 RATIO
GAMMA GLOB FLD ELPH-MCNC: 1 G/DL
GAMMA GLOB MFR SERPL ELPH: 13.4 %
IGA SER QL IEP: 83 MG/DL
IGG SER QL IEP: 1020 MG/DL
IGM SER QL IEP: 19 MG/DL
INTERPRETATION SERPL IEP-IMP: NORMAL
KAPPA LC CSF-MCNC: 1.81 MG/DL
KAPPA LC SERPL-MCNC: 1.02 MG/DL
M PROTEIN MFR SERPL ELPH: 2.9 %
M PROTEIN SPEC IFE-MCNC: NORMAL
MONOCLON BAND OBS SERPL: 0.2 G/DL
PROT SERPL-MCNC: 7.1 G/DL

## 2018-08-01 ENCOUNTER — LABORATORY RESULT (OUTPATIENT)
Age: 73
End: 2018-08-01

## 2018-08-01 ENCOUNTER — APPOINTMENT (OUTPATIENT)
Dept: INFUSION THERAPY | Facility: CLINIC | Age: 73
End: 2018-08-01

## 2018-08-01 ENCOUNTER — APPOINTMENT (OUTPATIENT)
Dept: HEMATOLOGY ONCOLOGY | Facility: CLINIC | Age: 73
End: 2018-08-01

## 2018-08-01 RX ORDER — BORTEZOMIB 2.5 MG/1
2.2 INJECTION INTRAVENOUS ONCE
Qty: 0 | Refills: 0 | Status: COMPLETED | OUTPATIENT
Start: 2018-08-01 | End: 2018-08-01

## 2018-08-01 RX ADMIN — BORTEZOMIB 2.2 MILLIGRAM(S): 2.5 INJECTION INTRAVENOUS at 11:24

## 2018-08-09 LAB
ALBUMIN MFR SERPL ELPH: 63.1 %
ALBUMIN SERPL ELPH-MCNC: 4.4 G/DL
ALBUMIN SERPL-MCNC: 4.3 G/DL
ALBUMIN/GLOB SERPL: 1.7 RATIO
ALP BLD-CCNC: 38 U/L
ALPHA1 GLOB MFR SERPL ELPH: 4.8 %
ALPHA1 GLOB SERPL ELPH-MCNC: 0.3 G/DL
ALPHA2 GLOB MFR SERPL ELPH: 6.7 %
ALPHA2 GLOB SERPL ELPH-MCNC: 0.5 G/DL
ALT SERPL-CCNC: 7 U/L
ANION GAP SERPL CALC-SCNC: 15 MMOL/L
AST SERPL-CCNC: 14 U/L
B-GLOBULIN MFR SERPL ELPH: 12 %
B-GLOBULIN SERPL ELPH-MCNC: 0.8 G/DL
BILIRUB SERPL-MCNC: 0.4 MG/DL
BUN SERPL-MCNC: 26 MG/DL
CALCIUM SERPL-MCNC: 9.5 MG/DL
CHLORIDE SERPL-SCNC: 105 MMOL/L
CO2 SERPL-SCNC: 25 MMOL/L
CREAT SERPL-MCNC: 1.1 MG/DL
DEPRECATED KAPPA LC FREE/LAMBDA SER: 0.57 RATIO
GAMMA GLOB FLD ELPH-MCNC: 0.9 G/DL
GAMMA GLOB MFR SERPL ELPH: 13.4 %
GLUCOSE SERPL-MCNC: 77 MG/DL
HCT VFR BLD CALC: 30.7 %
HGB BLD-MCNC: 10.9 G/DL
INTERPRETATION SERPL IEP-IMP: NORMAL
KAPPA LC CSF-MCNC: 1.67 MG/DL
KAPPA LC SERPL-MCNC: 0.96 MG/DL
M PROTEIN MFR SERPL ELPH: 2.6 %
MCHC RBC-ENTMCNC: 35.2 PG
MCHC RBC-ENTMCNC: 35.5 G/DL
MCV RBC AUTO: 99 FL
MONOCLON BAND OBS SERPL: 0.2 G/DL
PLATELET # BLD AUTO: 236 K/UL
PMV BLD: 9 FL
POTASSIUM SERPL-SCNC: 3.8 MMOL/L
PROT SERPL-MCNC: 6.8 G/DL
PROT SERPL-MCNC: 6.8 G/DL
RBC # BLD: 3.1 M/UL
RBC # FLD: 13.7 %
SODIUM SERPL-SCNC: 145 MMOL/L
WBC # FLD AUTO: 4.14 K/UL

## 2018-08-15 ENCOUNTER — APPOINTMENT (OUTPATIENT)
Dept: INFUSION THERAPY | Facility: CLINIC | Age: 73
End: 2018-08-15

## 2018-08-15 ENCOUNTER — APPOINTMENT (OUTPATIENT)
Dept: HEMATOLOGY ONCOLOGY | Facility: CLINIC | Age: 73
End: 2018-08-15

## 2018-08-15 ENCOUNTER — RX RENEWAL (OUTPATIENT)
Age: 73
End: 2018-08-15

## 2018-08-29 ENCOUNTER — LABORATORY RESULT (OUTPATIENT)
Age: 73
End: 2018-08-29

## 2018-08-29 ENCOUNTER — APPOINTMENT (OUTPATIENT)
Dept: HEMATOLOGY ONCOLOGY | Facility: CLINIC | Age: 73
End: 2018-08-29

## 2018-08-29 ENCOUNTER — APPOINTMENT (OUTPATIENT)
Dept: INFUSION THERAPY | Facility: CLINIC | Age: 73
End: 2018-08-29

## 2018-08-29 RX ORDER — BORTEZOMIB 2.5 MG/1
2.2 INJECTION INTRAVENOUS ONCE
Qty: 0 | Refills: 0 | Status: COMPLETED | OUTPATIENT
Start: 2018-08-29 | End: 2018-08-29

## 2018-08-29 RX ADMIN — BORTEZOMIB 2.2 MILLIGRAM(S): 2.5 INJECTION INTRAVENOUS at 11:47

## 2018-08-30 LAB
ANION GAP SERPL CALC-SCNC: 17 MMOL/L
BUN SERPL-MCNC: 20 MG/DL
CALCIUM SERPL-MCNC: 8.8 MG/DL
CHLORIDE SERPL-SCNC: 101 MMOL/L
CO2 SERPL-SCNC: 22 MMOL/L
CREAT SERPL-MCNC: 1.2 MG/DL
GLUCOSE SERPL-MCNC: 74 MG/DL
HCT VFR BLD CALC: 32.3 %
HGB BLD-MCNC: 11.4 G/DL
MCHC RBC-ENTMCNC: 34.4 PG
MCHC RBC-ENTMCNC: 35.3 G/DL
MCV RBC AUTO: 97.6 FL
PLATELET # BLD AUTO: 215 K/UL
PMV BLD: 9.1 FL
POTASSIUM SERPL-SCNC: 3.7 MMOL/L
RBC # BLD: 3.31 M/UL
RBC # FLD: 12.8 %
SODIUM SERPL-SCNC: 140 MMOL/L
WBC # FLD AUTO: 7.52 K/UL

## 2018-08-31 ENCOUNTER — APPOINTMENT (OUTPATIENT)
Dept: CARDIOLOGY | Facility: CLINIC | Age: 73
End: 2018-08-31

## 2018-08-31 VITALS
WEIGHT: 153 LBS | DIASTOLIC BLOOD PRESSURE: 79 MMHG | OXYGEN SATURATION: 98 % | HEART RATE: 92 BPM | HEIGHT: 66 IN | SYSTOLIC BLOOD PRESSURE: 179 MMHG | BODY MASS INDEX: 24.59 KG/M2

## 2018-09-12 ENCOUNTER — APPOINTMENT (OUTPATIENT)
Dept: HEMATOLOGY ONCOLOGY | Facility: CLINIC | Age: 73
End: 2018-09-12

## 2018-09-12 ENCOUNTER — APPOINTMENT (OUTPATIENT)
Dept: INFUSION THERAPY | Facility: CLINIC | Age: 73
End: 2018-09-12

## 2018-09-12 ENCOUNTER — LABORATORY RESULT (OUTPATIENT)
Age: 73
End: 2018-09-12

## 2018-09-12 ENCOUNTER — OUTPATIENT (OUTPATIENT)
Dept: OUTPATIENT SERVICES | Facility: HOSPITAL | Age: 73
LOS: 1 days | Discharge: HOME | End: 2018-09-12

## 2018-09-12 VITALS
HEIGHT: 66 IN | TEMPERATURE: 96.7 F | DIASTOLIC BLOOD PRESSURE: 61 MMHG | SYSTOLIC BLOOD PRESSURE: 139 MMHG | BODY MASS INDEX: 24.11 KG/M2 | RESPIRATION RATE: 14 BRPM | HEART RATE: 90 BPM | WEIGHT: 150 LBS

## 2018-09-12 DIAGNOSIS — C90.00 MULTIPLE MYELOMA NOT HAVING ACHIEVED REMISSION: ICD-10-CM

## 2018-09-12 RX ORDER — ZOLEDRONIC ACID 5 MG/100ML
3.5 INJECTION, SOLUTION INTRAVENOUS ONCE
Qty: 0 | Refills: 0 | Status: COMPLETED | OUTPATIENT
Start: 2018-09-12 | End: 2018-09-12

## 2018-09-12 RX ORDER — BORTEZOMIB 2.5 MG/1
2.2 INJECTION INTRAVENOUS ONCE
Qty: 0 | Refills: 0 | Status: COMPLETED | OUTPATIENT
Start: 2018-09-12 | End: 2018-09-12

## 2018-09-12 RX ADMIN — BORTEZOMIB 2.2 MILLIGRAM(S): 2.5 INJECTION INTRAVENOUS at 16:02

## 2018-09-12 RX ADMIN — ZOLEDRONIC ACID 300 MILLIGRAM(S): 5 INJECTION, SOLUTION INTRAVENOUS at 16:02

## 2018-09-13 LAB
ALBUMIN SERPL ELPH-MCNC: 4.4 G/DL
ALP BLD-CCNC: 34 U/L
ALT SERPL-CCNC: 10 U/L
ANION GAP SERPL CALC-SCNC: 17 MMOL/L
AST SERPL-CCNC: 16 U/L
BILIRUB SERPL-MCNC: 0.3 MG/DL
BUN SERPL-MCNC: 17 MG/DL
CALCIUM SERPL-MCNC: 9.1 MG/DL
CHLORIDE SERPL-SCNC: 103 MMOL/L
CO2 SERPL-SCNC: 23 MMOL/L
CREAT SERPL-MCNC: 1.1 MG/DL
GLUCOSE SERPL-MCNC: 97 MG/DL
HCT VFR BLD CALC: 30.7 %
HGB BLD-MCNC: 11 G/DL
MCHC RBC-ENTMCNC: 34.6 PG
MCHC RBC-ENTMCNC: 35.8 G/DL
MCV RBC AUTO: 96.5 FL
PLATELET # BLD AUTO: 281 K/UL
PMV BLD: 9.2 FL
POTASSIUM SERPL-SCNC: 3.9 MMOL/L
PROT SERPL-MCNC: 6.9 G/DL
RBC # BLD: 3.18 M/UL
RBC # FLD: 12.6 %
SODIUM SERPL-SCNC: 143 MMOL/L
WBC # FLD AUTO: 5.58 K/UL

## 2018-09-13 RX ORDER — VALSARTAN 40 MG/1
40 TABLET, COATED ORAL DAILY
Qty: 30 | Refills: 1 | Status: COMPLETED | COMMUNITY
Start: 2018-07-18 | End: 2018-09-13

## 2018-09-13 NOTE — REVIEW OF SYSTEMS
[Dry Eyes] : dryness of the eyes [Lower Ext Edema] : lower extremity edema [Incontinence] : incontinence [Confused] : confusion [Difficulty Walking] : difficulty walking [Negative] : Heme/Lymph [Fatigue] : no fatigue [Recent Change In Weight] : ~T no recent weight change [Joint Pain] : no joint pain [Muscle Pain] : no muscle pain [FreeTextEntry3] : Alina  [FreeTextEntry5] : Edema is a bit worse  [de-identified] : Dementia

## 2018-09-13 NOTE — HISTORY OF PRESENT ILLNESS
[Disease:__________________________] : Disease: [unfilled] [Therapy: ___] : Therapy: [unfilled] [Cycle: ___] : Cycle: [unfilled] [Day: ___] : Day: [unfilled] [de-identified] : This is a 72 year old female with history of sick sinus syndrome s/p PPM and as well as progressive dementia over 1.5 years was to see a specialist in [facility] who presented to [facility] due to a fall  as a result of  worsening gait on 11/23/17.   Her work up demonstrated , left tibial swelling, Acute displaced fracture of the distal humeral diaphysis.\par Multiple lytic bone lesions.  CT neck Multiple lytic lesions,  CT head At least mild to moderate hydrocephalus involving lateral, third and fourth ventricles. Innumerable lytic lesions.\par \par She was seen by Neurology who recommended outpatient LP and  Orhto casted the right humerus fracture and recommended Oncologic Ortho evaluation.  \par \par On admission she was noted to be Hypercalcemic and in ADRY. She received pamidronate 60 mg once with IVF and her hypercalcemia  and ADRY  resolved.\par \par Work up demonstrated lambda 352, kappa 8.5 with ration of 0.02,  SPEP M spike of 2.7 , Beta 2 7.2. Urine Total protein of  284 H  Albumin was 4.0\par \par She underwent a bone marrow biopsy on 12/1/17 that showed  flow cytometry performed at [facility]    Oncology report - Clonal (IgG-Lambda) plasma cells (28.2% of total), normocellular to mildly hypercellular (30-40%)    with a sheet of plasma cells, a large aggregate of plasma cells and    markedly increased interstitial plasma cells.  Megakaryocytes are    present.  Maturing myeloid and erythroid precursors are present.  Amyloid    deposition is not identified.  Special stains for Amyloid (Crystal Violet    and Congo Red) are negative.  Stainable iron is present (1-2+).  \par \par She also had a left lower extremity hematoma evacuation.\par \par She is here with her kids [FreeTextEntry1] : VD was started  initially while we waited for R. On cycle 2 with R we stopped RD on  6/2018 now on maintenance Velcade. [de-identified] : 01/09/2018\par  Patient is here today with her family members for a follow up visit. So far got 4 doses of velcade and is currently on Day 8 of revlimid.  As per family, they noticed that her dementia is more worse  after starting VRD. Also reports Insomnia. She was evaluated by neurologist and so far, the  reason for her dementia is not established. Decision on Lumbar puncture is not yet made as neurologist did not think her symptoms were related to Normal pressure hydrocephalus.\par  ALso complaints of insomnia since being on VRD, likely from steriods. Her right arm has been healing well since the surgery and she will begin physical therapy soon. \par PET/CT results reviewed. Noted to have multiple FDG avid lytic lesions including right anterior second rib with SUV of 9.5.\par \par 1/25/18 \par She is here for follow up for her myeloma. Her  lambda started to fall and mspike went down as well she was only on Revlimid for 8 days,  Her GFR is also improving now 71.\par We decreased her dexamethasone to 16 mg po weekly due to psychosis. She now has mild ankle swelling in bilateral extremities. She now has urinary incontinence. Has bilateral hip pains in morning that improve with ambulation.  She started this cycle 2 on Teusday\par \par 2/15/18\par She is here for follow up. Her Neurologist in [facility] found a CT scan from [facility] back that also had hydrocephalus thus Alziemers is the cause of her dementia. They were seen in [facility] and VRD was agreed with although their dosing recommendations was to move it to q28 days. We decided to keep things as is since they are use to this schedule. She small  blisters on eye lids small under 5 mm  one in each lower eye lid. They are not painful.  She is getting out of chair with ease. No other complaints> She also has repeat CT scan of head as per her neurologist results are bellow.\par \par 3/9/18\par She is here for follow up. She is dong well. She is here with her daughter. Trazodone is helping. No pains. He rash in the eyes has resolved they did not see optho.\par \par \par 3/28/18\par She is here for follow up with her son. She is doing well.  Son states she is physically better.  No complaints. Swelling in legs improved.\par \par 4/18/48\par She is here for follow up. She once again has Sty in her eyelids. It does not botehr her. she is doing well otherwise she has no pain.\par \par 5/10/18\par She is here for follow up with her daughter. HEr eyes are better using a wash. DId not see optho, Now on SSRI and Quatione?. Her light chain and Mspike keeps coming down\par \par 5/30/18 \par \par She is here for follow up. She is doing well. Her LIght chain is not normal. still had positive ROSALIND.  She started the Revlimid 3 days ago.\par \par 6/22/18\par She is here for follow up with daughter. She is having a hard time with mood swings with steroids so dexa was  stopped  it. \par \par 7/18/18\par She is here with her daughter. She is doing well. Still has some LE swelling.  No other issues. \par \par 9/12/18\par She is here for follow up. Doing well. HEre with her daughter. No new complaints. Myeloma panel stable

## 2018-09-13 NOTE — PHYSICAL EXAM
[Restricted in physically strenuous activity but ambulatory and able to carry out work of a light or sedentary nature] : Status 1- Restricted in physically strenuous activity but ambulatory and able to carry out work of a light or sedentary nature, e.g., light house work, office work [Normal] : normal appearance, no rash, nodules, vesicles, ulcers, erythema [de-identified] : PPM [de-identified] : MIld to   edema in Lower extremities on  Lasix [de-identified] :  right arm scar [de-identified] : dementia, incontinence, ambulation difficulties but stable

## 2018-09-13 NOTE — ASSESSMENT
[FreeTextEntry1] : IgG Lamda Multiple Myeloma with lytic bone lesions, anemia and hypercalcemia  on presentation, normal LDH,  Beta 2 7.2. Repeat levels were as follows , Albumin was 4.0. repeat Beta 2- 4.8\par  FISH- hyperploidy and gain of CCN1 which puts her at  standard risk. \par   PET/CT multiple FDG avid lytic lesion consistent with myeloma.\par -- Was  VRd..  We lowered her dose to 12 mg weekly of dexa now since she gets agitated on  16 mg\par -She achieved a VGPR and  was having issues with Dexa . Its hard to give her pills so now on Velcade maintenance , no neuropathy\par \par \par Plan:\par - Velcade maintenance every 2 weeks, Myeloma panel has been stable will motor  \par -Zometa 3.5 mg monthly will go to every 3 months after one year if no progression\par -continue calcium and Vitamin D\par -on ASA 81 mg can be stopped \par --on Acyclovir 400 mg BID\par -edema  the same on Lasix, Norvasc was stop  and on Valsartan 40 mg daily but due to recall will start losartan 25 mg daily since on lasix\par -Myeloma panel sent\par \par Pathologic Right Humerus fracture s/p fixation - Dr. Glenn Salter\par -on physical therapy\par -will clinically monitor \par \par \par Dementia - most likely Alzheimers \par -Neurology follow up Jessica Aguiar  fax \par -stable on SSRI now  Seraquil? \par -will check CT Brain with contrast  as requested ordered and they will have it done\par \par Anemia due to MM  and or treatment \par -stable\par \par Hypercalcemia from MM\par -resolved \par \par Edema\par - maybe due to Norvasc, was sopped on Losartan now\par -she is now on Lasix 20 mg daily, compression stocking and elevation encouraged \par \par \par RTC 4 weeks

## 2018-09-17 LAB
ALBUMIN MFR SERPL ELPH: 61.5 %
ALBUMIN MFR SERPL ELPH: 61.6 %
ALBUMIN SERPL-MCNC: 3.9 G/DL
ALBUMIN SERPL-MCNC: 4.2 G/DL
ALBUMIN/GLOB SERPL: 1.6 RATIO
ALBUMIN/GLOB SERPL: 1.6 RATIO
ALPHA1 GLOB MFR SERPL ELPH: 4.1 %
ALPHA1 GLOB MFR SERPL ELPH: 4.3 %
ALPHA1 GLOB SERPL ELPH-MCNC: 0.3 G/DL
ALPHA1 GLOB SERPL ELPH-MCNC: 0.3 G/DL
ALPHA2 GLOB MFR SERPL ELPH: 6.2 %
ALPHA2 GLOB MFR SERPL ELPH: 6.2 %
ALPHA2 GLOB SERPL ELPH-MCNC: 0.4 G/DL
ALPHA2 GLOB SERPL ELPH-MCNC: 0.4 G/DL
B-GLOBULIN MFR SERPL ELPH: 13.3 %
B-GLOBULIN MFR SERPL ELPH: 13.6 %
B-GLOBULIN SERPL ELPH-MCNC: 0.9 G/DL
B-GLOBULIN SERPL ELPH-MCNC: 0.9 G/DL
DEPRECATED KAPPA LC FREE/LAMBDA SER: 0.5 RATIO
DEPRECATED KAPPA LC FREE/LAMBDA SER: 0.52 RATIO
GAMMA GLOB FLD ELPH-MCNC: 1 G/DL
GAMMA GLOB FLD ELPH-MCNC: 1 G/DL
GAMMA GLOB MFR SERPL ELPH: 14.3 %
GAMMA GLOB MFR SERPL ELPH: 14.9 %
IGA SER QL IEP: 73 MG/DL
IGG SER QL IEP: 1166 MG/DL
IGM SER QL IEP: 23 MG/DL
INTERPRETATION SERPL IEP-IMP: NORMAL
INTERPRETATION SERPL IEP-IMP: NORMAL
KAPPA LC CSF-MCNC: 1.86 MG/DL
KAPPA LC CSF-MCNC: 1.99 MG/DL
KAPPA LC SERPL-MCNC: 0.93 MG/DL
KAPPA LC SERPL-MCNC: 1.04 MG/DL
M PROTEIN MFR SERPL ELPH: 3.3 %
M PROTEIN MFR SERPL ELPH: 3.4 %
M PROTEIN SPEC IFE-MCNC: NORMAL
MONOCLON BAND OBS SERPL: 0.2 G/DL
MONOCLON BAND OBS SERPL: 0.2 G/DL
PROT SERPL-MCNC: 6.4 G/DL
PROT SERPL-MCNC: 6.4 G/DL
PROT SERPL-MCNC: 6.8 G/DL
PROT SERPL-MCNC: 6.8 G/DL

## 2018-09-26 ENCOUNTER — APPOINTMENT (OUTPATIENT)
Dept: HEMATOLOGY ONCOLOGY | Facility: CLINIC | Age: 73
End: 2018-09-26

## 2018-09-26 ENCOUNTER — APPOINTMENT (OUTPATIENT)
Dept: INFUSION THERAPY | Facility: CLINIC | Age: 73
End: 2018-09-26

## 2018-10-10 ENCOUNTER — APPOINTMENT (OUTPATIENT)
Dept: HEMATOLOGY ONCOLOGY | Facility: CLINIC | Age: 73
End: 2018-10-10

## 2018-10-10 ENCOUNTER — APPOINTMENT (OUTPATIENT)
Dept: INFUSION THERAPY | Facility: CLINIC | Age: 73
End: 2018-10-10

## 2018-10-10 ENCOUNTER — LABORATORY RESULT (OUTPATIENT)
Age: 73
End: 2018-10-10

## 2018-10-10 VITALS
DIASTOLIC BLOOD PRESSURE: 74 MMHG | HEIGHT: 66 IN | SYSTOLIC BLOOD PRESSURE: 125 MMHG | HEART RATE: 86 BPM | TEMPERATURE: 96.3 F | RESPIRATION RATE: 14 BRPM | WEIGHT: 150 LBS | BODY MASS INDEX: 24.11 KG/M2

## 2018-10-10 RX ORDER — BORTEZOMIB 2.5 MG/1
2.2 INJECTION INTRAVENOUS ONCE
Qty: 0 | Refills: 0 | Status: COMPLETED | OUTPATIENT
Start: 2018-10-10 | End: 2018-10-10

## 2018-10-10 RX ORDER — QUETIAPINE 100 MG/1
100 TABLET, FILM COATED ORAL
Refills: 0 | Status: ACTIVE | COMMUNITY

## 2018-10-10 RX ORDER — ZOLEDRONIC ACID 5 MG/100ML
3.5 INJECTION, SOLUTION INTRAVENOUS ONCE
Qty: 0 | Refills: 0 | Status: COMPLETED | OUTPATIENT
Start: 2018-10-10 | End: 2018-10-10

## 2018-10-10 RX ADMIN — ZOLEDRONIC ACID 300 MILLIGRAM(S): 5 INJECTION, SOLUTION INTRAVENOUS at 14:01

## 2018-10-10 RX ADMIN — BORTEZOMIB 2.2 MILLIGRAM(S): 2.5 INJECTION INTRAVENOUS at 14:02

## 2018-10-12 LAB
ALBUMIN MFR SERPL ELPH: 61.6 %
ALBUMIN SERPL ELPH-MCNC: 4.5 G/DL
ALBUMIN SERPL-MCNC: 4 G/DL
ALBUMIN/GLOB SERPL: 1.6 RATIO
ALP BLD-CCNC: 34 U/L
ALPHA1 GLOB MFR SERPL ELPH: 4.6 %
ALPHA1 GLOB SERPL ELPH-MCNC: 0.3 G/DL
ALPHA2 GLOB MFR SERPL ELPH: 6.5 %
ALPHA2 GLOB SERPL ELPH-MCNC: 0.4 G/DL
ALT SERPL-CCNC: 9 U/L
ANION GAP SERPL CALC-SCNC: 13 MMOL/L
AST SERPL-CCNC: 14 U/L
B-GLOBULIN MFR SERPL ELPH: 12.3 %
B-GLOBULIN SERPL ELPH-MCNC: 0.8 G/DL
BILIRUB SERPL-MCNC: 0.3 MG/DL
BUN SERPL-MCNC: 24 MG/DL
CALCIUM SERPL-MCNC: 9.1 MG/DL
CHLORIDE SERPL-SCNC: 103 MMOL/L
CO2 SERPL-SCNC: 27 MMOL/L
CREAT SERPL-MCNC: 1 MG/DL
DEPRECATED KAPPA LC FREE/LAMBDA SER: 0.47 RATIO
GAMMA GLOB FLD ELPH-MCNC: 1 G/DL
GAMMA GLOB MFR SERPL ELPH: 15 %
GLUCOSE SERPL-MCNC: 110 MG/DL
HCT VFR BLD CALC: 32.2 %
HGB BLD-MCNC: 11.3 G/DL
IGA SER QL IEP: 70 MG/DL
IGG SER QL IEP: 1005 MG/DL
IGM SER QL IEP: 23 MG/DL
INTERPRETATION SERPL IEP-IMP: NORMAL
KAPPA LC CSF-MCNC: 2.18 MG/DL
KAPPA LC SERPL-MCNC: 1.03 MG/DL
M PROTEIN MFR SERPL ELPH: 4.7 %
M PROTEIN SPEC IFE-MCNC: NORMAL
MCHC RBC-ENTMCNC: 34.3 PG
MCHC RBC-ENTMCNC: 35.1 G/DL
MCV RBC AUTO: 97.9 FL
MONOCLON BAND OBS SERPL: 0.3 G/DL
PLATELET # BLD AUTO: 212 K/UL
PMV BLD: 9.7 FL
POTASSIUM SERPL-SCNC: 3.9 MMOL/L
PROT SERPL-MCNC: 6.5 G/DL
PROT SERPL-MCNC: 6.5 G/DL
PROT SERPL-MCNC: 6.6 G/DL
RBC # BLD: 3.29 M/UL
RBC # FLD: 12.3 %
SODIUM SERPL-SCNC: 143 MMOL/L
WBC # FLD AUTO: 6.07 K/UL

## 2018-10-14 NOTE — PHYSICAL EXAM
[Restricted in physically strenuous activity but ambulatory and able to carry out work of a light or sedentary nature] : Status 1- Restricted in physically strenuous activity but ambulatory and able to carry out work of a light or sedentary nature, e.g., light house work, office work [Normal] : normal appearance, no rash, nodules, vesicles, ulcers, erythema [de-identified] : PPM [de-identified] : MIld to   edema in Lower extremities on  Lasix [de-identified] :  right arm scar [de-identified] : dementia, incontinence, ambulation difficulties but stable

## 2018-10-14 NOTE — REVIEW OF SYSTEMS
[Fatigue] : no fatigue [Recent Change In Weight] : ~T no recent weight change [Dry Eyes] : dryness of the eyes [Lower Ext Edema] : lower extremity edema [Incontinence] : incontinence [Joint Pain] : no joint pain [Muscle Pain] : no muscle pain [Confused] : confusion [Difficulty Walking] : difficulty walking [Negative] : Heme/Lymph [FreeTextEntry3] : Alina  [FreeTextEntry5] : Edema is a bit worse  [de-identified] : Dementia

## 2018-10-14 NOTE — HISTORY OF PRESENT ILLNESS
[Therapy: ___] : Therapy: [unfilled] [Cycle: ___] : Cycle: [unfilled] [Day: ___] : Day: [unfilled] [FreeTextEntry1] : VD was started  initially while we waited for R. On cycle 2 with R we stopped RD on  6/2018 now on maintenance Velcade. [de-identified] : 01/09/2018\par  Patient is here today with her family members for a follow up visit. So far got 4 doses of velcade and is currently on Day 8 of revlimid.  As per family, they noticed that her dementia is more worse  after starting VRD. Also reports Insomnia. She was evaluated by neurologist and so far, the  reason for her dementia is not established. Decision on Lumbar puncture is not yet made as neurologist did not think her symptoms were related to Normal pressure hydrocephalus.\par  ALso complaints of insomnia since being on VRD, likely from steriods. Her right arm has been healing well since the surgery and she will begin physical therapy soon. \par PET/CT results reviewed. Noted to have multiple FDG avid lytic lesions including right anterior second rib with SUV of 9.5.\par \par 1/25/18 \par She is here for follow up for her myeloma. Her  lambda started to fall and mspike went down as well she was only on Revlimid for 8 days,  Her GFR is also improving now 71.\par We decreased her dexamethasone to 16 mg po weekly due to psychosis. She now has mild ankle swelling in bilateral extremities. She now has urinary incontinence. Has bilateral hip pains in morning that improve with ambulation.  She started this cycle 2 on Teusday\par \par 2/15/18\par She is here for follow up. Her Neurologist in [facility] found a CT scan from [facility] back that also had hydrocephalus thus Alziemers is the cause of her dementia. They were seen in [facility] and VRD was agreed with although their dosing recommendations was to move it to q28 days. We decided to keep things as is since they are use to this schedule. She small  blisters on eye lids small under 5 mm  one in each lower eye lid. They are not painful.  She is getting out of chair with ease. No other complaints> She also has repeat CT scan of head as per her neurologist results are bellow.\par \par 3/9/18\par She is here for follow up. She is dong well. She is here with her daughter. Trazodone is helping. No pains. He rash in the eyes has resolved they did not see optho.\par \par \par 3/28/18\par She is here for follow up with her son. She is doing well.  Son states she is physically better.  No complaints. Swelling in legs improved.\par \par 4/18/48\par She is here for follow up. She once again has Sty in her eyelids. It does not botehr her. she is doing well otherwise she has no pain.\par \par 5/10/18\par She is here for follow up with her daughter. HEr eyes are better using a wash. DId not see optho, Now on SSRI and Quatione?. Her light chain and Mspike keeps coming down\par \par 5/30/18 \par \par She is here for follow up. She is doing well. Her LIght chain is not normal. still had positive ROSALIND.  She started the Revlimid 3 days ago.\par \par 6/22/18\par She is here for follow up with daughter. She is having a hard time with mood swings with steroids so dexa was  stopped  it. \par \par 7/18/18\par She is here with her daughter. She is doing well. Still has some LE swelling.  No other issues. \par \par 9/12/18\par She is here for follow up. Doing well. HEre with her daughter. No new complaints. Myeloma panel stable \par \par 10/10/18\par Here with her daughter. Doing well. No issues> myeloma pane stable [Disease:__________________________] : Disease: [unfilled] [de-identified] : This is a 72 year old female with history of sick sinus syndrome s/p PPM and as well as progressive dementia over 1.5 years was to see a specialist in [facility] who presented to [facility] due to a fall  as a result of  worsening gait on 11/23/17.   Her work up demonstrated , left tibial swelling, Acute displaced fracture of the distal humeral diaphysis.\par Multiple lytic bone lesions.  CT neck Multiple lytic lesions,  CT head At least mild to moderate hydrocephalus involving lateral, third and fourth ventricles. Innumerable lytic lesions.\par \par She was seen by Neurology who recommended outpatient LP and  Orhto casted the right humerus fracture and recommended Oncologic Ortho evaluation.  \par \par On admission she was noted to be Hypercalcemic and in ADRY. She received pamidronate 60 mg once with IVF and her hypercalcemia  and ADRY  resolved.\par \par Work up demonstrated lambda 352, kappa 8.5 with ration of 0.02,  SPEP M spike of 2.7 , Beta 2 7.2. Urine Total protein of  284 H  Albumin was 4.0\par \par She underwent a bone marrow biopsy on 12/1/17 that showed  flow cytometry performed at [facility]    Oncology report - Clonal (IgG-Lambda) plasma cells (28.2% of total), normocellular to mildly hypercellular (30-40%)    with a sheet of plasma cells, a large aggregate of plasma cells and    markedly increased interstitial plasma cells.  Megakaryocytes are    present.  Maturing myeloid and erythroid precursors are present.  Amyloid    deposition is not identified.  Special stains for Amyloid (Crystal Violet    and Congo Red) are negative.  Stainable iron is present (1-2+).  \par \par She also had a left lower extremity hematoma evacuation.\par \par She is here with her kids

## 2018-10-14 NOTE — ASSESSMENT
[FreeTextEntry1] : IgG Lamda Multiple Myeloma with lytic bone lesions, anemia and hypercalcemia  on presentation, normal LDH,  Beta 2 7.2. Repeat levels were as follows , Albumin was 4.0. repeat Beta 2- 4.8\par  FISH- hyperploidy and gain of CCN1 which puts her at  standard risk. \par   PET/CT multiple FDG avid lytic lesion consistent with myeloma.\par -- Was on   VRd..  with low dose  dexa now since she gets agitated on steroids\par -She achieved a VGPR and  was having issues with Dexa . Its hard to give her pills so now on Velcade maintenance , no neuropathy\par \par \par Plan:\par - Velcade maintenance every 2 weeks, Myeloma panel has been stable will motor  \par -Zometa 3.5 mg monthly will go to every 3 months after one year if no progression\par -continue calcium and Vitamin D\par --on Acyclovir 400 mg BID\par -edema  the same on Lasix, Norvasc was stop  On  losartan 25 mg daily  with good BP control\par -Myeloma panel sent\par -will consider LDCT to evaluate bony lesion prior to switching to Zometa to every 3 months \par \par Pathologic Right Humerus fracture s/p fixation - Dr. Glenn Salter\par -on physical therapy\par -will clinically monitor \par \par \par Dementia - most likely Alzheimers \par -Neurology follow up Jessica Aguiar  fax \par -stable on SSRI and  Seraquil\par -will check CT Brain with contrast  as requested ordered and they will have it done, they have not done it yet \par \par Anemia due to MM  and or treatment \par -stable, mild\par \par Hypercalcemia from MM\par -resolved \par \par Edema\par - maybe due to Norvasc, was sopped on Losartan now\par -she is now on Lasix 20 mg daily, compression stocking and elevation encouraged \par \par \par RTC 4 weeks

## 2018-10-24 ENCOUNTER — APPOINTMENT (OUTPATIENT)
Dept: INFUSION THERAPY | Facility: CLINIC | Age: 73
End: 2018-10-24

## 2018-10-24 ENCOUNTER — LABORATORY RESULT (OUTPATIENT)
Age: 73
End: 2018-10-24

## 2018-10-24 LAB
ALBUMIN SERPL ELPH-MCNC: 4.7 G/DL
ALP BLD-CCNC: 38 U/L
ALT SERPL-CCNC: 9 U/L
ANION GAP SERPL CALC-SCNC: 16 MMOL/L
AST SERPL-CCNC: 17 U/L
BILIRUB SERPL-MCNC: 0.3 MG/DL
BUN SERPL-MCNC: 27 MG/DL
CALCIUM SERPL-MCNC: 9.3 MG/DL
CHLORIDE SERPL-SCNC: 101 MMOL/L
CO2 SERPL-SCNC: 25 MMOL/L
CREAT SERPL-MCNC: 1 MG/DL
GLUCOSE SERPL-MCNC: 64 MG/DL
HCT VFR BLD CALC: 32.4 %
HGB BLD-MCNC: 11.1 G/DL
MCHC RBC-ENTMCNC: 34 PG
MCHC RBC-ENTMCNC: 34.3 G/DL
MCV RBC AUTO: 99.4 FL
PLATELET # BLD AUTO: 251 K/UL
PMV BLD: 9 FL
POTASSIUM SERPL-SCNC: 4.1 MMOL/L
PROT SERPL-MCNC: 7.3 G/DL
RBC # BLD: 3.26 M/UL
RBC # FLD: 12.4 %
SODIUM SERPL-SCNC: 142 MMOL/L
WBC # FLD AUTO: 6.02 K/UL

## 2018-10-24 RX ORDER — BORTEZOMIB 2.5 MG/1
2.2 INJECTION INTRAVENOUS ONCE
Qty: 0 | Refills: 0 | Status: COMPLETED | OUTPATIENT
Start: 2018-10-24 | End: 2018-10-24

## 2018-10-24 RX ADMIN — BORTEZOMIB 2.2 MILLIGRAM(S): 2.5 INJECTION INTRAVENOUS at 13:42

## 2018-11-07 ENCOUNTER — APPOINTMENT (OUTPATIENT)
Dept: INFUSION THERAPY | Facility: CLINIC | Age: 73
End: 2018-11-07

## 2018-11-07 ENCOUNTER — APPOINTMENT (OUTPATIENT)
Dept: HEMATOLOGY ONCOLOGY | Facility: CLINIC | Age: 73
End: 2018-11-07

## 2018-11-07 ENCOUNTER — LABORATORY RESULT (OUTPATIENT)
Age: 73
End: 2018-11-07

## 2018-11-07 VITALS
WEIGHT: 152 LBS | TEMPERATURE: 95.6 F | BODY MASS INDEX: 24.43 KG/M2 | HEART RATE: 92 BPM | HEIGHT: 66 IN | SYSTOLIC BLOOD PRESSURE: 141 MMHG | DIASTOLIC BLOOD PRESSURE: 84 MMHG

## 2018-11-07 LAB
ALBUMIN SERPL ELPH-MCNC: 4.2 G/DL
ALP BLD-CCNC: 35 U/L
ALT SERPL-CCNC: 10 U/L
ANION GAP SERPL CALC-SCNC: 15 MMOL/L
AST SERPL-CCNC: 16 U/L
BILIRUB SERPL-MCNC: 0.3 MG/DL
BUN SERPL-MCNC: 24 MG/DL
CALCIUM SERPL-MCNC: 9.7 MG/DL
CHLORIDE SERPL-SCNC: 105 MMOL/L
CO2 SERPL-SCNC: 25 MMOL/L
CREAT SERPL-MCNC: 1 MG/DL
GLUCOSE SERPL-MCNC: 78 MG/DL
HCT VFR BLD CALC: 32.7 %
HGB BLD-MCNC: 11.2 G/DL
MCHC RBC-ENTMCNC: 33.6 PG
MCHC RBC-ENTMCNC: 34.3 G/DL
MCV RBC AUTO: 98.2 FL
PLATELET # BLD AUTO: 239 K/UL
PMV BLD: 9.6 FL
POTASSIUM SERPL-SCNC: 4.5 MMOL/L
PROT SERPL-MCNC: 7 G/DL
RBC # BLD: 3.33 M/UL
RBC # FLD: 12.2 %
SODIUM SERPL-SCNC: 145 MMOL/L
WBC # FLD AUTO: 5.06 K/UL

## 2018-11-07 RX ORDER — BORTEZOMIB 2.5 MG/1
2.2 INJECTION INTRAVENOUS ONCE
Qty: 0 | Refills: 0 | Status: COMPLETED | OUTPATIENT
Start: 2018-11-07 | End: 2018-11-07

## 2018-11-07 RX ORDER — ZOLEDRONIC ACID 5 MG/100ML
4 INJECTION, SOLUTION INTRAVENOUS ONCE
Qty: 0 | Refills: 0 | Status: COMPLETED | OUTPATIENT
Start: 2018-11-07 | End: 2018-11-07

## 2018-11-07 RX ADMIN — ZOLEDRONIC ACID 200 MILLIGRAM(S): 5 INJECTION, SOLUTION INTRAVENOUS at 14:37

## 2018-11-07 RX ADMIN — BORTEZOMIB 2.2 MILLIGRAM(S): 2.5 INJECTION INTRAVENOUS at 14:37

## 2018-11-09 LAB
ALBUMIN MFR SERPL ELPH: 62.2 %
ALBUMIN SERPL-MCNC: 4.7 G/DL
ALBUMIN/GLOB SERPL: 1.6 RATIO
ALPHA1 GLOB MFR SERPL ELPH: 4.2 %
ALPHA1 GLOB SERPL ELPH-MCNC: 0.3 G/DL
ALPHA2 GLOB MFR SERPL ELPH: 6.7 %
ALPHA2 GLOB SERPL ELPH-MCNC: 0.5 G/DL
B-GLOBULIN MFR SERPL ELPH: 12 %
B-GLOBULIN SERPL ELPH-MCNC: 0.9 G/DL
DEPRECATED KAPPA LC FREE/LAMBDA SER: 0.36 RATIO
GAMMA GLOB FLD ELPH-MCNC: 1.1 G/DL
GAMMA GLOB MFR SERPL ELPH: 14.9 %
IGA SER QL IEP: 70 MG/DL
IGG SER QL IEP: 1064 MG/DL
IGM SER QL IEP: 19 MG/DL
INTERPRETATION SERPL IEP-IMP: NORMAL
KAPPA LC CSF-MCNC: 3.2 MG/DL
KAPPA LC SERPL-MCNC: 1.15 MG/DL
M PROTEIN MFR SERPL ELPH: 5.7 %
M PROTEIN SPEC IFE-MCNC: NORMAL
MONOCLON BAND OBS SERPL: 0.4 G/DL
PROT SERPL-MCNC: 7.6 G/DL
PROT SERPL-MCNC: 7.6 G/DL

## 2018-11-13 NOTE — PHYSICAL EXAM
[Restricted in physically strenuous activity but ambulatory and able to carry out work of a light or sedentary nature] : Status 1- Restricted in physically strenuous activity but ambulatory and able to carry out work of a light or sedentary nature, e.g., light house work, office work [Normal] : normal appearance, no rash, nodules, vesicles, ulcers, erythema [de-identified] : PPM [de-identified] : MIld to   edema in Lower extremities on  Lasix [de-identified] :  right arm scar [de-identified] : dementia, incontinence, ambulation difficulties but stable

## 2018-11-13 NOTE — REVIEW OF SYSTEMS
[Dry Eyes] : dryness of the eyes [Lower Ext Edema] : lower extremity edema [Incontinence] : incontinence [Confused] : confusion [Difficulty Walking] : difficulty walking [Negative] : Heme/Lymph [Fatigue] : no fatigue [Recent Change In Weight] : ~T no recent weight change [Joint Pain] : no joint pain [Muscle Pain] : no muscle pain [FreeTextEntry3] : Alina  [FreeTextEntry5] : Edema is a bit worse  [de-identified] : Dementia

## 2018-11-13 NOTE — HISTORY OF PRESENT ILLNESS
[Therapy: ___] : Therapy: [unfilled] [Cycle: ___] : Cycle: [unfilled] [Day: ___] : Day: [unfilled] [Disease:__________________________] : Disease: [unfilled] [FreeTextEntry1] : VD was started  initially while we waited for R. On cycle 2 with R we stopped RD on  6/2018 now on maintenance Velcade. [de-identified] : 01/09/2018\par  Patient is here today with her family members for a follow up visit. So far got 4 doses of velcade and is currently on Day 8 of revlimid.  As per family, they noticed that her dementia is more worse  after starting VRD. Also reports Insomnia. She was evaluated by neurologist and so far, the  reason for her dementia is not established. Decision on Lumbar puncture is not yet made as neurologist did not think her symptoms were related to Normal pressure hydrocephalus.\par  ALso complaints of insomnia since being on VRD, likely from steriods. Her right arm has been healing well since the surgery and she will begin physical therapy soon. \par PET/CT results reviewed. Noted to have multiple FDG avid lytic lesions including right anterior second rib with SUV of 9.5.\par \par 1/25/18 \par She is here for follow up for her myeloma. Her  lambda started to fall and mspike went down as well she was only on Revlimid for 8 days,  Her GFR is also improving now 71.\par We decreased her dexamethasone to 16 mg po weekly due to psychosis. She now has mild ankle swelling in bilateral extremities. She now has urinary incontinence. Has bilateral hip pains in morning that improve with ambulation.  She started this cycle 2 on Teusday\par \par 2/15/18\par She is here for follow up. Her Neurologist in [facility] found a CT scan from [facility] back that also had hydrocephalus thus Alziemers is the cause of her dementia. They were seen in [facility] and VRD was agreed with although their dosing recommendations was to move it to q28 days. We decided to keep things as is since they are use to this schedule. She small  blisters on eye lids small under 5 mm  one in each lower eye lid. They are not painful.  She is getting out of chair with ease. No other complaints> She also has repeat CT scan of head as per her neurologist results are bellow.\par \par 3/9/18\par She is here for follow up. She is dong well. She is here with her daughter. Trazodone is helping. No pains. He rash in the eyes has resolved they did not see optho.\par \par \par 3/28/18\par She is here for follow up with her son. She is doing well.  Son states she is physically better.  No complaints. Swelling in legs improved.\par \par 4/18/48\par She is here for follow up. She once again has Sty in her eyelids. It does not botehr her. she is doing well otherwise she has no pain.\par \par 5/10/18\par She is here for follow up with her daughter. HEr eyes are better using a wash. DId not see optho, Now on SSRI and Quatione?. Her light chain and Mspike keeps coming down\par \par 5/30/18 \par \par She is here for follow up. She is doing well. Her LIght chain is not normal. still had positive ROSALIND.  She started the Revlimid 3 days ago.\par \par 6/22/18\par She is here for follow up with daughter. She is having a hard time with mood swings with steroids so dexa was  stopped  it. \par \par 7/18/18\par She is here with her daughter. She is doing well. Still has some LE swelling.  No other issues. \par \par 9/12/18\par She is here for follow up. Doing well. HEre with her daughter. No new complaints. Myeloma panel stable \par \par 10/10/18\par Here with her daughter. Doing well. No issues> myeloma pane stable\par \par 11/7/18\par Patient is here for follow-up with her daughter.  Doing well.   No neuropathy.  We will continue monthly Zometa with one dose today.  We will also send the patient for a whole body PETscan. [de-identified] : This is a 72 year old female with history of sick sinus syndrome s/p PPM and as well as progressive dementia over 1.5 years was to see a specialist in [facility] who presented to [facility] due to a fall  as a result of  worsening gait on 11/23/17.   Her work up demonstrated , left tibial swelling, Acute displaced fracture of the distal humeral diaphysis.\par Multiple lytic bone lesions.  CT neck Multiple lytic lesions,  CT head At least mild to moderate hydrocephalus involving lateral, third and fourth ventricles. Innumerable lytic lesions.\par \par She was seen by Neurology who recommended outpatient LP and  Orhto casted the right humerus fracture and recommended Oncologic Ortho evaluation.  \par \par On admission she was noted to be Hypercalcemic and in ADRY. She received pamidronate 60 mg once with IVF and her hypercalcemia  and ADRY  resolved.\par \par Work up demonstrated lambda 352, kappa 8.5 with ration of 0.02,  SPEP M spike of 2.7 , Beta 2 7.2. Urine Total protein of  284 H  Albumin was 4.0\par \par She underwent a bone marrow biopsy on 12/1/17 that showed  flow cytometry performed at [facility]    Oncology report - Clonal (IgG-Lambda) plasma cells (28.2% of total), normocellular to mildly hypercellular (30-40%)    with a sheet of plasma cells, a large aggregate of plasma cells and    markedly increased interstitial plasma cells.  Megakaryocytes are    present.  Maturing myeloid and erythroid precursors are present.  Amyloid    deposition is not identified.  Special stains for Amyloid (Crystal Violet    and Congo Red) are negative.  Stainable iron is present (1-2+).  \par \par She also had a left lower extremity hematoma evacuation.\par \par She is here with her kids

## 2018-11-13 NOTE — ASSESSMENT
[FreeTextEntry1] : IgG Lamda Multiple Myeloma with lytic bone lesions, anemia and hypercalcemia  on presentation, normal LDH,  Beta 2 7.2. Repeat levels were as follows , Albumin was 4.0. repeat Beta 2- 4.8\par  FISH- hyperploidy and gain of CCN1 which puts her at  standard risk. \par   PET/CT multiple FDG avid lytic lesion consistent with myeloma.\par -- Was on   VRd..  with low dose  dexa now since she gets agitated on steroids\par -She achieved a VGPR and  was having issues with Dexa . Its hard to give her pills so now on Velcade maintenance , no neuropathy\par \par \par Plan:\par - Velcade maintenance every 2 weeks, Myeloma panel has been stable will motor; patient will be at 1 year of treatment in December\par -Zometa 3.5 mg monthly will go to every 3 months after one year if no progressionn; she will receive dose today\par -continue calcium and Vitamin D\par -will check PET/CT prior to next visit \par --on Acyclovir 400 mg BID\par -edema  the same on Lasix, Norvasc was stop  On  losartan 25 mg daily  with good BP control\par -Myeloma panel stable\par \par Pathologic Right Humerus fracture s/p fixation - Dr. Glenn Salter\par \par RTC in one month\par \par \par Dementia - most likely Alzheimers \par -Neurology follow up Jessica Aguiar  fax \par -stable on SSRI and  Seraquil\par -will check CT Brain with contrast  as requested ordered and they will have it done, they have not done it yet \par \par Anemia due to MM  and or treatment \par -stable, mild\par \par Hypercalcemia from MM\par -resolved \par \par Edema\par - maybe due to Norvasc, was sopped on Losartan now\par -she is now on Lasix 20 mg daily, compression stocking and elevation encouraged \par \par \par RTC in 1 month after PETscan\par

## 2018-11-21 ENCOUNTER — LABORATORY RESULT (OUTPATIENT)
Age: 73
End: 2018-11-21

## 2018-11-21 ENCOUNTER — APPOINTMENT (OUTPATIENT)
Dept: INFUSION THERAPY | Facility: CLINIC | Age: 73
End: 2018-11-21

## 2018-11-21 RX ORDER — BORTEZOMIB 2.5 MG/1
2.2 INJECTION INTRAVENOUS ONCE
Qty: 0 | Refills: 0 | Status: COMPLETED | OUTPATIENT
Start: 2018-11-21 | End: 2018-11-21

## 2018-11-21 RX ADMIN — BORTEZOMIB 2.2 MILLIGRAM(S): 2.5 INJECTION INTRAVENOUS at 13:15

## 2018-11-26 LAB
ALBUMIN SERPL ELPH-MCNC: 4.5 G/DL
ALP BLD-CCNC: 37 U/L
ALT SERPL-CCNC: 11 U/L
ANION GAP SERPL CALC-SCNC: 19 MMOL/L
AST SERPL-CCNC: 20 U/L
BILIRUB SERPL-MCNC: 0.4 MG/DL
BUN SERPL-MCNC: 27 MG/DL
CALCIUM SERPL-MCNC: 9.7 MG/DL
CHLORIDE SERPL-SCNC: 104 MMOL/L
CO2 SERPL-SCNC: 20 MMOL/L
CREAT SERPL-MCNC: 1.1 MG/DL
GLUCOSE SERPL-MCNC: 70 MG/DL
HCT VFR BLD CALC: 33.7 %
HGB BLD-MCNC: 11.3 G/DL
MCHC RBC-ENTMCNC: 33.5 G/DL
MCHC RBC-ENTMCNC: 33.7 PG
MCV RBC AUTO: 100.6 FL
PLATELET # BLD AUTO: 197 K/UL
PMV BLD: 9.5 FL
POTASSIUM SERPL-SCNC: 4.4 MMOL/L
PROT SERPL-MCNC: 7 G/DL
RBC # BLD: 3.35 M/UL
RBC # FLD: 12.3 %
SODIUM SERPL-SCNC: 143 MMOL/L
WBC # FLD AUTO: 3.85 K/UL

## 2018-12-05 ENCOUNTER — APPOINTMENT (OUTPATIENT)
Dept: INFUSION THERAPY | Facility: CLINIC | Age: 73
End: 2018-12-05

## 2018-12-05 ENCOUNTER — LABORATORY RESULT (OUTPATIENT)
Age: 73
End: 2018-12-05

## 2018-12-05 ENCOUNTER — APPOINTMENT (OUTPATIENT)
Dept: HEMATOLOGY ONCOLOGY | Facility: CLINIC | Age: 73
End: 2018-12-05

## 2018-12-05 VITALS
HEART RATE: 87 BPM | DIASTOLIC BLOOD PRESSURE: 72 MMHG | SYSTOLIC BLOOD PRESSURE: 121 MMHG | TEMPERATURE: 96.5 F | BODY MASS INDEX: 24.75 KG/M2 | RESPIRATION RATE: 14 BRPM | HEIGHT: 66 IN | WEIGHT: 154 LBS

## 2018-12-05 RX ORDER — ZOLEDRONIC ACID 5 MG/100ML
4 INJECTION, SOLUTION INTRAVENOUS ONCE
Qty: 0 | Refills: 0 | Status: COMPLETED | OUTPATIENT
Start: 2018-12-05 | End: 2018-12-05

## 2018-12-05 RX ORDER — BORTEZOMIB 2.5 MG/1
2.2 INJECTION INTRAVENOUS ONCE
Qty: 0 | Refills: 0 | Status: COMPLETED | OUTPATIENT
Start: 2018-12-05 | End: 2018-12-05

## 2018-12-05 RX ADMIN — BORTEZOMIB 2.2 MILLIGRAM(S): 2.5 INJECTION INTRAVENOUS at 13:23

## 2018-12-05 RX ADMIN — ZOLEDRONIC ACID 200 MILLIGRAM(S): 5 INJECTION, SOLUTION INTRAVENOUS at 13:23

## 2018-12-06 LAB
ALBUMIN SERPL ELPH-MCNC: 4.5 G/DL
ALP BLD-CCNC: 36 U/L
ALT SERPL-CCNC: 12 U/L
ANION GAP SERPL CALC-SCNC: 15 MMOL/L
AST SERPL-CCNC: 16 U/L
BILIRUB SERPL-MCNC: 0.3 MG/DL
BUN SERPL-MCNC: 21 MG/DL
CALCIUM SERPL-MCNC: 9.7 MG/DL
CHLORIDE SERPL-SCNC: 98 MMOL/L
CO2 SERPL-SCNC: 26 MMOL/L
CREAT SERPL-MCNC: 1.1 MG/DL
GLUCOSE SERPL-MCNC: 104 MG/DL
HCT VFR BLD CALC: 33.7 %
HGB BLD-MCNC: 11.8 G/DL
MCHC RBC-ENTMCNC: 33.7 PG
MCHC RBC-ENTMCNC: 35 G/DL
MCV RBC AUTO: 96.3 FL
PLATELET # BLD AUTO: 252 K/UL
PMV BLD: 9.4 FL
POTASSIUM SERPL-SCNC: 4 MMOL/L
PROT SERPL-MCNC: 6.8 G/DL
RBC # BLD: 3.5 M/UL
RBC # FLD: 12.1 %
SODIUM SERPL-SCNC: 139 MMOL/L
WBC # FLD AUTO: 6.45 K/UL

## 2018-12-10 LAB
DEPRECATED KAPPA LC FREE/LAMBDA SER: 0.32 RATIO
KAPPA LC CSF-MCNC: 3.69 MG/DL
KAPPA LC SERPL-MCNC: 1.17 MG/DL

## 2018-12-13 ENCOUNTER — RX RENEWAL (OUTPATIENT)
Age: 73
End: 2018-12-13

## 2018-12-13 LAB
ALBUMIN MFR SERPL ELPH: 63 %
ALBUMIN SERPL-MCNC: 4.3 G/DL
ALBUMIN/GLOB SERPL: 1.7 RATIO
ALPHA1 GLOB MFR SERPL ELPH: 4.1 %
ALPHA1 GLOB SERPL ELPH-MCNC: 0.3 G/DL
ALPHA2 GLOB MFR SERPL ELPH: 6.4 %
ALPHA2 GLOB SERPL ELPH-MCNC: 0.4 G/DL
B-GLOBULIN MFR SERPL ELPH: 11.3 %
B-GLOBULIN SERPL ELPH-MCNC: 0.8 G/DL
DEPRECATED KAPPA LC FREE/LAMBDA SER: 0.3 RATIO
GAMMA GLOB FLD ELPH-MCNC: 1 G/DL
GAMMA GLOB MFR SERPL ELPH: 15.2 %
IGA SER QL IEP: 91 MG/DL
IGG SER QL IEP: 1586 MG/DL
IGM SER QL IEP: 28 MG/DL
INTERPRETATION SERPL IEP-IMP: NORMAL
KAPPA LC CSF-MCNC: 4.73 MG/DL
KAPPA LC SERPL-MCNC: 1.4 MG/DL
M PROTEIN MFR SERPL ELPH: 5.6 %
M PROTEIN SPEC IFE-MCNC: NORMAL
MONOCLON BAND OBS SERPL: 0.4 G/DL
PROT SERPL-MCNC: 6.8 G/DL
PROT SERPL-MCNC: 7.5 G/DL

## 2018-12-14 NOTE — ASSESSMENT
[FreeTextEntry1] : IgG Lamda Multiple Myeloma with lytic bone lesions, anemia and hypercalcemia  on presentation, normal LDH,  Beta 2 7.2. Repeat levels were as follows , Albumin was 4.0. repeat Beta 2- 4.8\par  FISH- hyperploidy and gain of CCN1 which puts her at  standard risk. \par   PET/CT multiple FDG avid lytic lesion consistent with myeloma.\par -- Was on   VRd..  with low dose  she gets agitated on steroids\par -She achieved a VGPR and  was having issues with Dexa . Its hard to give her pills so now on Velcade maintenance , no neuropathy\par \par \par Plan:\par - Velcade maintenance every 2 weeks; patient at 1 year of treatment as of December\par -Zometa 3.5 mg monthly will continue with montly for now given recent up trend in  Mspike.\par -continue calcium and Vitamin D\par -will check PET/CT prior to next visit \par --on Acyclovir 400 mg BID\par -edema  the same on Lasix, Norvasc was stop  On  losartan 25 mg daily  with good BP control\par -The M-spike and lambda light chain is trending up but will continue current regimen as no clear progression \par \par Pathologic Right Humerus fracture s/p fixation - Dr. Glenn Salter\par \par Dementia - most likely Alzheimers \par -Neurology follow up Jessica Aguiar  fax \par -stable on SSRI and  Seraquil\par - CT Brain with contrast was recommended by her Neurologist again , they are holding off at this point\par \par Anemia due to MM  and or treatment \par -stable, mild\par \par Hypercalcemia from MM\par -resolved \par \par Edema, Norvasc was stopped\par -she is now on Lasix 20 mg daily, compression stocking and elevation encouraged \par \par \par RTC in 1 month

## 2018-12-14 NOTE — HISTORY OF PRESENT ILLNESS
[Therapy: ___] : Therapy: [unfilled] [Cycle: ___] : Cycle: [unfilled] [Day: ___] : Day: [unfilled] [Disease:__________________________] : Disease: [unfilled] [FreeTextEntry1] : VD was started  initially while we waited for R. On cycle 2 with R we stopped RD on  6/2018 now on maintenance Velcade. [de-identified] : 01/09/2018\par  Patient is here today with her family members for a follow up visit. So far got 4 doses of velcade and is currently on Day 8 of revlimid.  As per family, they noticed that her dementia is more worse  after starting VRD. Also reports Insomnia. She was evaluated by neurologist and so far, the  reason for her dementia is not established. Decision on Lumbar puncture is not yet made as neurologist did not think her symptoms were related to Normal pressure hydrocephalus.\par  ALso complaints of insomnia since being on VRD, likely from steriods. Her right arm has been healing well since the surgery and she will begin physical therapy soon. \par PET/CT results reviewed. Noted to have multiple FDG avid lytic lesions including right anterior second rib with SUV of 9.5.\par \par 1/25/18 \par She is here for follow up for her myeloma. Her  lambda started to fall and mspike went down as well she was only on Revlimid for 8 days,  Her GFR is also improving now 71.\par We decreased her dexamethasone to 16 mg po weekly due to psychosis. She now has mild ankle swelling in bilateral extremities. She now has urinary incontinence. Has bilateral hip pains in morning that improve with ambulation.  She started this cycle 2 on Teusday\par \par 2/15/18\par She is here for follow up. Her Neurologist in [facility] found a CT scan from [facility] back that also had hydrocephalus thus Alziemers is the cause of her dementia. They were seen in [facility] [place] and VRD was agreed with although their dosing recommendations was to move it to q28 days. We decided to keep things as is since they are use to this schedule. She small  blisters on eye lids small under 5 mm  one in each lower eye lid. They are not painful.  She is getting out of chair with ease. No other complaints> She also has repeat CT scan of head as per her neurologist results are bellow.\par \par 3/9/18\par She is here for follow up. She is dong well. She is here with her daughter. Trazodone is helping. No pains. He rash in the eyes has resolved they did not see optho.\par \par \par 3/28/18\par She is here for follow up with her son. She is doing well.  Son states she is physically better.  No complaints. Swelling in legs improved.\par \par 4/18/48\par She is here for follow up. She once again has Sty in her eyelids. It does not botehr her. she is doing well otherwise she has no pain.\par \par 5/10/18\par She is here for follow up with her daughter. HEr eyes are better using a wash. DId not see optho, Now on SSRI and Quatione?. Her light chain and Mspike keeps coming down\par \par 5/30/18 \par \par She is here for follow up. She is doing well. Her LIght chain is not normal. still had positive ROSALIND.  She started the Revlimid 3 days ago.\par \par 6/22/18\par She is here for follow up with daughter. She is having a hard time with mood swings with steroids so dexa was  stopped  it. \par \par 7/18/18\par She is here with her daughter. She is doing well. Still has some LE swelling.  No other issues. \par \par 9/12/18\par She is here for follow up. Doing well. HEre with her daughter. No new complaints. Myeloma panel stable \par \par 10/10/18\par Here with her daughter. Doing well. No issues> myeloma pane stable\par \par 11/7/18\par Patient is here for follow-up with her daughter.  Doing well.   No neuropathy.  We will continue monthly Zometa with one dose today.  We will also send the patient for a whole body PETscan.\par \par 12/5/18\par Patient is here for follow-up with her daughter.  The M-spike and lambda light chain is trending up but will continue current regimen.  The fact that there may be progression, we will continue with monthly Zometa for now.  They will do the PET/CT after the holidays.  Continue the Velcade.\par  [de-identified] : This is a 72 year old female with history of sick sinus syndrome s/p PPM and as well as progressive dementia over 1.5 years was to see a specialist in [facility] who presented to [facility] due to a fall  as a result of  worsening gait on 11/23/17.   Her work up demonstrated , left tibial swelling, Acute displaced fracture of the distal humeral diaphysis.\par Multiple lytic bone lesions.  CT neck Multiple lytic lesions,  CT head At least mild to moderate hydrocephalus involving lateral, third and fourth ventricles. Innumerable lytic lesions.\par \par She was seen by Neurology who recommended outpatient LP and  Orhto casted the right humerus fracture and recommended Oncologic Ortho evaluation.  \par \par On admission she was noted to be Hypercalcemic and in ADRY. She received pamidronate 60 mg once with IVF and her hypercalcemia  and ADRY  resolved.\par \par Work up demonstrated lambda 352, kappa 8.5 with ration of 0.02,  SPEP M spike of 2.7 , Beta 2 7.2. Urine Total protein of  284 H  Albumin was 4.0\par \par She underwent a bone marrow biopsy on 12/1/17 that showed  flow cytometry performed at [facility]    Oncology report - Clonal (IgG-Lambda) plasma cells (28.2% of total), normocellular to mildly hypercellular (30-40%)    with a sheet of plasma cells, a large aggregate of plasma cells and    markedly increased interstitial plasma cells.  Megakaryocytes are    present.  Maturing myeloid and erythroid precursors are present.  Amyloid    deposition is not identified.  Special stains for Amyloid (Crystal Violet    and Congo Red) are negative.  Stainable iron is present (1-2+).  \par \par She also had a left lower extremity hematoma evacuation.\par \par She is here with her kids

## 2018-12-14 NOTE — PHYSICAL EXAM
[Restricted in physically strenuous activity but ambulatory and able to carry out work of a light or sedentary nature] : Status 1- Restricted in physically strenuous activity but ambulatory and able to carry out work of a light or sedentary nature, e.g., light house work, office work [Normal] : normal appearance, no rash, nodules, vesicles, ulcers, erythema [de-identified] : PPM [de-identified] : MIld to   edema in Lower extremities on  Lasix [de-identified] :  right arm scar [de-identified] : dementia, incontinence, ambulation difficulties but stable

## 2018-12-14 NOTE — REVIEW OF SYSTEMS
[Dry Eyes] : dryness of the eyes [Lower Ext Edema] : lower extremity edema [Incontinence] : incontinence [Confused] : confusion [Difficulty Walking] : difficulty walking [Negative] : Heme/Lymph [Fatigue] : no fatigue [Recent Change In Weight] : ~T no recent weight change [Joint Pain] : no joint pain [Muscle Pain] : no muscle pain [FreeTextEntry3] : Alina  [FreeTextEntry5] : Edema is a bit worse  [de-identified] : Dementia

## 2018-12-19 ENCOUNTER — APPOINTMENT (OUTPATIENT)
Dept: INFUSION THERAPY | Facility: CLINIC | Age: 73
End: 2018-12-19

## 2018-12-19 ENCOUNTER — LABORATORY RESULT (OUTPATIENT)
Age: 73
End: 2018-12-19

## 2018-12-19 LAB
HCT VFR BLD CALC: 34.6 %
HGB BLD-MCNC: 12.2 G/DL
MCHC RBC-ENTMCNC: 34.2 PG
MCHC RBC-ENTMCNC: 35.3 G/DL
MCV RBC AUTO: 96.9 FL
PLATELET # BLD AUTO: 234 K/UL
PMV BLD: 9 FL
RBC # BLD: 3.57 M/UL
RBC # FLD: 12 %
WBC # FLD AUTO: 4.86 K/UL

## 2018-12-19 RX ORDER — BORTEZOMIB 2.5 MG/1
2.2 INJECTION INTRAVENOUS ONCE
Qty: 0 | Refills: 0 | Status: COMPLETED | OUTPATIENT
Start: 2018-12-19 | End: 2018-12-19

## 2018-12-19 RX ADMIN — BORTEZOMIB 2.2 MILLIGRAM(S): 2.5 INJECTION INTRAVENOUS at 12:26

## 2018-12-20 LAB
ALBUMIN SERPL ELPH-MCNC: 4.6 G/DL
ALP BLD-CCNC: 36 U/L
ALT SERPL-CCNC: 13 U/L
ANION GAP SERPL CALC-SCNC: 14 MMOL/L
AST SERPL-CCNC: 22 U/L
BILIRUB SERPL-MCNC: 0.3 MG/DL
BUN SERPL-MCNC: 25 MG/DL
CALCIUM SERPL-MCNC: 10.1 MG/DL
CHLORIDE SERPL-SCNC: 100 MMOL/L
CO2 SERPL-SCNC: 27 MMOL/L
CREAT SERPL-MCNC: 1.2 MG/DL
GLUCOSE SERPL-MCNC: 84 MG/DL
POTASSIUM SERPL-SCNC: 4 MMOL/L
PROT SERPL-MCNC: 7.2 G/DL
SODIUM SERPL-SCNC: 141 MMOL/L

## 2019-01-02 ENCOUNTER — APPOINTMENT (OUTPATIENT)
Dept: HEMATOLOGY ONCOLOGY | Facility: CLINIC | Age: 74
End: 2019-01-02

## 2019-01-02 ENCOUNTER — APPOINTMENT (OUTPATIENT)
Dept: INFUSION THERAPY | Facility: CLINIC | Age: 74
End: 2019-01-02

## 2019-01-10 ENCOUNTER — FORM ENCOUNTER (OUTPATIENT)
Age: 74
End: 2019-01-10

## 2019-01-11 ENCOUNTER — RX RENEWAL (OUTPATIENT)
Age: 74
End: 2019-01-11

## 2019-01-11 ENCOUNTER — OUTPATIENT (OUTPATIENT)
Dept: OUTPATIENT SERVICES | Facility: HOSPITAL | Age: 74
LOS: 1 days | Discharge: HOME | End: 2019-01-11

## 2019-01-11 DIAGNOSIS — C90.00 MULTIPLE MYELOMA NOT HAVING ACHIEVED REMISSION: ICD-10-CM

## 2019-01-11 LAB — GLUCOSE BLDC GLUCOMTR-MCNC: 67 MG/DL — LOW (ref 70–99)

## 2019-01-16 ENCOUNTER — LABORATORY RESULT (OUTPATIENT)
Age: 74
End: 2019-01-16

## 2019-01-16 ENCOUNTER — OUTPATIENT (OUTPATIENT)
Dept: OUTPATIENT SERVICES | Facility: HOSPITAL | Age: 74
LOS: 1 days | Discharge: HOME | End: 2019-01-16

## 2019-01-16 ENCOUNTER — APPOINTMENT (OUTPATIENT)
Dept: INFUSION THERAPY | Facility: CLINIC | Age: 74
End: 2019-01-16

## 2019-01-16 DIAGNOSIS — C90.00 MULTIPLE MYELOMA NOT HAVING ACHIEVED REMISSION: ICD-10-CM

## 2019-01-16 RX ORDER — BORTEZOMIB 2.5 MG/1
2.2 INJECTION INTRAVENOUS ONCE
Qty: 0 | Refills: 0 | Status: COMPLETED | OUTPATIENT
Start: 2019-01-16 | End: 2019-01-16

## 2019-01-16 RX ADMIN — BORTEZOMIB 2.2 MILLIGRAM(S): 2.5 INJECTION INTRAVENOUS at 12:50

## 2019-01-17 LAB
ALBUMIN SERPL ELPH-MCNC: 4.6 G/DL
ALP BLD-CCNC: 34 U/L
ALT SERPL-CCNC: 14 U/L
ANION GAP SERPL CALC-SCNC: 19 MMOL/L
AST SERPL-CCNC: 21 U/L
BILIRUB SERPL-MCNC: 0.3 MG/DL
BUN SERPL-MCNC: 22 MG/DL
CALCIUM SERPL-MCNC: 9.9 MG/DL
CHLORIDE SERPL-SCNC: 100 MMOL/L
CO2 SERPL-SCNC: 21 MMOL/L
CREAT SERPL-MCNC: 1.2 MG/DL
GLUCOSE SERPL-MCNC: 80 MG/DL
HCT VFR BLD CALC: 33.7 %
HGB BLD-MCNC: 11.3 G/DL
MCHC RBC-ENTMCNC: 32.7 PG
MCHC RBC-ENTMCNC: 33.5 G/DL
MCV RBC AUTO: 97.4 FL
PLATELET # BLD AUTO: 205 K/UL
PMV BLD: 9.2 FL
POTASSIUM SERPL-SCNC: 4.7 MMOL/L
PROT SERPL-MCNC: 7.2 G/DL
RBC # BLD: 3.46 M/UL
RBC # FLD: 12.3 %
SODIUM SERPL-SCNC: 140 MMOL/L
WBC # FLD AUTO: 3.66 K/UL

## 2019-01-30 ENCOUNTER — LABORATORY RESULT (OUTPATIENT)
Age: 74
End: 2019-01-30

## 2019-01-30 ENCOUNTER — APPOINTMENT (OUTPATIENT)
Dept: INFUSION THERAPY | Facility: CLINIC | Age: 74
End: 2019-01-30

## 2019-01-30 ENCOUNTER — APPOINTMENT (OUTPATIENT)
Dept: HEMATOLOGY ONCOLOGY | Facility: CLINIC | Age: 74
End: 2019-01-30

## 2019-01-30 VITALS
DIASTOLIC BLOOD PRESSURE: 82 MMHG | BODY MASS INDEX: 25.07 KG/M2 | HEART RATE: 94 BPM | WEIGHT: 156 LBS | HEIGHT: 66 IN | SYSTOLIC BLOOD PRESSURE: 158 MMHG | TEMPERATURE: 96.1 F | RESPIRATION RATE: 14 BRPM

## 2019-01-30 RX ORDER — LOSARTAN POTASSIUM 25 MG/1
25 TABLET, FILM COATED ORAL DAILY
Qty: 30 | Refills: 6 | Status: DISCONTINUED | COMMUNITY
Start: 2018-09-13 | End: 2019-01-30

## 2019-01-30 RX ORDER — BORTEZOMIB 2.5 MG/1
2.2 INJECTION INTRAVENOUS ONCE
Qty: 0 | Refills: 0 | Status: COMPLETED | OUTPATIENT
Start: 2019-01-30 | End: 2019-01-30

## 2019-01-30 RX ADMIN — BORTEZOMIB 2.2 MILLIGRAM(S): 2.5 INJECTION INTRAVENOUS at 13:02

## 2019-01-31 LAB
HCT VFR BLD CALC: 31.6 %
HGB BLD-MCNC: 11.2 G/DL
MCHC RBC-ENTMCNC: 33.2 PG
MCHC RBC-ENTMCNC: 35.4 G/DL
MCV RBC AUTO: 93.8 FL
PLATELET # BLD AUTO: 213 K/UL
PMV BLD: 9.3 FL
RBC # BLD: 3.37 M/UL
RBC # FLD: 12.2 %
WBC # FLD AUTO: 4.31 K/UL

## 2019-02-03 NOTE — REVIEW OF SYSTEMS
[Dry Eyes] : dryness of the eyes [Lower Ext Edema] : lower extremity edema [Incontinence] : incontinence [Confused] : confusion [Difficulty Walking] : difficulty walking [Negative] : Heme/Lymph [Fatigue] : no fatigue [Recent Change In Weight] : ~T no recent weight change [Joint Pain] : no joint pain [Muscle Pain] : no muscle pain [FreeTextEntry5] : Edema has moderately improved [de-identified] : Dementia

## 2019-02-03 NOTE — HISTORY OF PRESENT ILLNESS
[Therapy: ___] : Therapy: [unfilled] [Cycle: ___] : Cycle: [unfilled] [Day: ___] : Day: [unfilled] [Disease:__________________________] : Disease: [unfilled] [FreeTextEntry1] : VD was started  initially while we waited for R. On cycle 2 with R we stopped RD on  6/2018 now on maintenance Velcade. [de-identified] : 01/09/2018\par  Patient is here today with her family members for a follow up visit. So far got 4 doses of velcade and is currently on Day 8 of revlimid.  As per family, they noticed that her dementia is more worse  after starting VRD. Also reports Insomnia. She was evaluated by neurologist and so far, the  reason for her dementia is not established. Decision on Lumbar puncture is not yet made as neurologist did not think her symptoms were related to Normal pressure hydrocephalus.\par  ALso complaints of insomnia since being on VRD, likely from steriods. Her right arm has been healing well since the surgery and she will begin physical therapy soon. \par PET/CT results reviewed. Noted to have multiple FDG avid lytic lesions including right anterior second rib with SUV of 9.5.\par \par 1/25/18 \par She is here for follow up for her myeloma. Her  lambda started to fall and mspike went down as well she was only on Revlimid for 8 days,  Her GFR is also improving now 71.\par We decreased her dexamethasone to 16 mg po weekly due to psychosis. She now has mild ankle swelling in bilateral extremities. She now has urinary incontinence. Has bilateral hip pains in morning that improve with ambulation.  She started this cycle 2 on Teusday\par \par 2/15/18\par She is here for follow up. Her Neurologist in [facility] found a CT scan from [facility] back that also had hydrocephalus thus Alziemers is the cause of her dementia. They were seen in [facility] and VRD was agreed with although their dosing recommendations was to move it to q28 days. We decided to keep things as is since they are use to this schedule. She small  blisters on eye lids small under 5 mm  one in each lower eye lid. They are not painful.  She is getting out of chair with ease. No other complaints> She also has repeat CT scan of head as per her neurologist results are bellow.\par \par 3/9/18\par She is here for follow up. She is dong well. She is here with her daughter. Trazodone is helping. No pains. He rash in the eyes has resolved they did not see optho.\par \par \par 3/28/18\par She is here for follow up with her son. She is doing well.  Son states she is physically better.  No complaints. Swelling in legs improved.\par \par 4/18/48\par She is here for follow up. She once again has Sty in her eyelids. It does not botehr her. she is doing well otherwise she has no pain.\par \par 5/10/18\par She is here for follow up with her daughter. HEr eyes are better using a wash. DId not see optho, Now on SSRI and Quatione?. Her light chain and Mspike keeps coming down\par \par 5/30/18 \par \par She is here for follow up. She is doing well. Her LIght chain is not normal. still had positive ROSALIND.  She started the Revlimid 3 days ago.\par \par 6/22/18\par She is here for follow up with daughter. She is having a hard time with mood swings with steroids so dexa was  stopped  it. \par \par 7/18/18\par She is here with her daughter. She is doing well. Still has some LE swelling.  No other issues. \par \par 9/12/18\par She is here for follow up. Doing well. HEre with her daughter. No new complaints. Myeloma panel stable \par \par 10/10/18\par Here with her daughter. Doing well. No issues> myeloma pane stable\par \par 11/7/18\par Patient is here for follow-up with her daughter.  Doing well.   No neuropathy.  We will continue monthly Zometa with one dose today.  We will also send the patient for a whole body PETscan.\par \par 12/5/18\par Patient is here for follow-up with her daughter.  The M-spike and lambda light chain is trending up but will continue current regimen.  The fact that there may be progression, we will continue with monthly Zometa for now.  They will do the PET/CT after the holidays.  Continue the Velcade. \par \par 1/30/19\par Patient is here for a follow-up visit with her son.  She is feeling well with no complaints.  Most recent CBC is stable.  Patient denies fever, chills, nausea, vomiting.  We will continue Zometa every 3 months.  The Lambda proteins are slowly increasing, now at 4.73 from 3.69 .  She is still taking the Acyclovir for prophylaxis.  \par She had a PET/CT to evaluate her bone disease that showed  Since December 14, 2017, overall decreased or resolved FDG uptake in  numerous lytic osseous lesions, consistent with good treatment response  (for example, 52% decrease in a right anterior second rib lesion now with  SUV of 4.6); highest SUV 5.8 in the right anterior 10th rib, 9% decrease.  2.  No new sites of abnormal FDG uptake.   [de-identified] : This is a 72 year old female with history of sick sinus syndrome s/p PPM and as well as progressive dementia over 1.5 years was to see a specialist in [facility] who presented to [facility] due to a fall  as a result of  worsening gait on 11/23/17.   Her work up demonstrated , left tibial swelling, Acute displaced fracture of the distal humeral diaphysis.\par Multiple lytic bone lesions.  CT neck Multiple lytic lesions,  CT head At least mild to moderate hydrocephalus involving lateral, third and fourth ventricles. Innumerable lytic lesions.\par \par She was seen by Neurology who recommended outpatient LP and  Orhto casted the right humerus fracture and recommended Oncologic Ortho evaluation.  \par \par On admission she was noted to be Hypercalcemic and in ADRY. She received pamidronate 60 mg once with IVF and her hypercalcemia  and ADRY  resolved.\par \par Work up demonstrated lambda 352, kappa 8.5 with ration of 0.02,  SPEP M spike of 2.7 , Beta 2 7.2. Urine Total protein of  284 H  Albumin was 4.0\par \par She underwent a bone marrow biopsy on 12/1/17 that showed  flow cytometry performed at [facility]    Oncology report - Clonal (IgG-Lambda) plasma cells (28.2% of total), normocellular to mildly hypercellular (30-40%)    with a sheet of plasma cells, a large aggregate of plasma cells and    markedly increased interstitial plasma cells.  Megakaryocytes are    present.  Maturing myeloid and erythroid precursors are present.  Amyloid    deposition is not identified.  Special stains for Amyloid (Crystal Violet    and Congo Red) are negative.  Stainable iron is present (1-2+).  \par \par She also had a left lower extremity hematoma evacuation.\par \par She is here with her kids

## 2019-02-03 NOTE — ASSESSMENT
[FreeTextEntry1] : IgG Lamda Multiple Myeloma with lytic bone lesions, anemia and hypercalcemia  on presentation, normal LDH,  Beta 2 7.2. Repeat levels were as follows , Albumin was 4.0. repeat Beta 2- 4.8\par  FISH- hyperploidy and gain of CCN1 which puts her at  standard risk. \par   PET/CT multiple FDG avid lytic lesion consistent with myeloma.\par -- Was on   VRd..  with low dose  she gets agitated on steroids\par -She achieved a VGPR and  was having issues with Dexa . Its hard to give her pills so now on Velcade maintenance , no neuropathy\par \par Plan:\par - Velcade maintenance every 2 weeks; patient at 1 year of treatment as of December 2018\par - Zometa 3.5 mg  will go to every 3 months, once myeloma progresses will go back to monthly \par - continue calcium and Vitamin D\par - on Acyclovir 400 mg BID\par -edema  the same on Lasix, Norvasc was stop  On  losartan 25 mg daily  with good BP control\par -The M-spike and lambda light chain is trending up but will continue current regimen as no clear progression \par \par Pathologic Right Humerus fracture s/p fixation - Dr. Glenn Salter\par \par Dementia - most likely Alzheimers \par -Neurology follow up Jessica Aguiar  fax \par -stable on SSRI and  Seraquil\par - CT Brain with contrast was recommended by her Neurologist again , they are holding off at this point\par \par Anemia due to MM  and or treatment \par -stable, mild\par \par Hypercalcemia from MM\par -resolved \par \par Edema, Norvasc was stopped\par -she is now on Lasix 20 mg daily, compression stocking and elevation encouraged \par \par RTC in 1 month with CBC, CMP, serum immunoelectrophoresis

## 2019-02-03 NOTE — PHYSICAL EXAM
[Restricted in physically strenuous activity but ambulatory and able to carry out work of a light or sedentary nature] : Status 1- Restricted in physically strenuous activity but ambulatory and able to carry out work of a light or sedentary nature, e.g., light house work, office work [Normal] : normal appearance, no rash, nodules, vesicles, ulcers, erythema [de-identified] : PPM [de-identified] : trace edema in Lower extremities on Lasix, improved from last visit [de-identified] :  right arm scar [de-identified] : dementia, incontinence, ambulation difficulties but stable

## 2019-02-04 ENCOUNTER — RX RENEWAL (OUTPATIENT)
Age: 74
End: 2019-02-04

## 2019-02-13 ENCOUNTER — APPOINTMENT (OUTPATIENT)
Dept: INFUSION THERAPY | Facility: CLINIC | Age: 74
End: 2019-02-13

## 2019-02-13 ENCOUNTER — LABORATORY RESULT (OUTPATIENT)
Age: 74
End: 2019-02-13

## 2019-02-13 ENCOUNTER — APPOINTMENT (OUTPATIENT)
Dept: HEMATOLOGY ONCOLOGY | Facility: CLINIC | Age: 74
End: 2019-02-13

## 2019-02-13 RX ORDER — BORTEZOMIB 2.5 MG/1
2.2 INJECTION INTRAVENOUS ONCE
Qty: 0 | Refills: 0 | Status: COMPLETED | OUTPATIENT
Start: 2019-02-13 | End: 2019-02-13

## 2019-02-13 RX ADMIN — BORTEZOMIB 2.2 MILLIGRAM(S): 2.5 INJECTION INTRAVENOUS at 13:58

## 2019-02-14 LAB
ALBUMIN SERPL ELPH-MCNC: 4.4 G/DL
ALP BLD-CCNC: 31 U/L
ALT SERPL-CCNC: 16 U/L
ANION GAP SERPL CALC-SCNC: 18 MMOL/L
AST SERPL-CCNC: 29 U/L
BILIRUB SERPL-MCNC: 0.4 MG/DL
BUN SERPL-MCNC: 32 MG/DL
CALCIUM SERPL-MCNC: 8.7 MG/DL
CHLORIDE SERPL-SCNC: 99 MMOL/L
CO2 SERPL-SCNC: 22 MMOL/L
CREAT SERPL-MCNC: 1.1 MG/DL
GLUCOSE SERPL-MCNC: 80 MG/DL
HCT VFR BLD CALC: 33.2 %
HGB BLD-MCNC: 11.4 G/DL
MCHC RBC-ENTMCNC: 33.8 PG
MCHC RBC-ENTMCNC: 34.3 G/DL
MCV RBC AUTO: 98.5 FL
PLATELET # BLD AUTO: 206 K/UL
PMV BLD: 9.3 FL
POTASSIUM SERPL-SCNC: 4.4 MMOL/L
PROT SERPL-MCNC: 6.9 G/DL
RBC # BLD: 3.37 M/UL
RBC # FLD: 12.7 %
SODIUM SERPL-SCNC: 139 MMOL/L
WBC # FLD AUTO: 4.27 K/UL

## 2019-02-27 ENCOUNTER — LABORATORY RESULT (OUTPATIENT)
Age: 74
End: 2019-02-27

## 2019-02-27 ENCOUNTER — OTHER (OUTPATIENT)
Age: 74
End: 2019-02-27

## 2019-02-27 ENCOUNTER — APPOINTMENT (OUTPATIENT)
Dept: INFUSION THERAPY | Facility: CLINIC | Age: 74
End: 2019-02-27

## 2019-02-27 ENCOUNTER — APPOINTMENT (OUTPATIENT)
Dept: HEMATOLOGY ONCOLOGY | Facility: CLINIC | Age: 74
End: 2019-02-27

## 2019-02-27 VITALS
RESPIRATION RATE: 14 BRPM | DIASTOLIC BLOOD PRESSURE: 60 MMHG | HEIGHT: 66 IN | WEIGHT: 154 LBS | TEMPERATURE: 96.2 F | BODY MASS INDEX: 24.75 KG/M2 | SYSTOLIC BLOOD PRESSURE: 115 MMHG | HEART RATE: 96 BPM

## 2019-02-27 RX ORDER — BORTEZOMIB 2.5 MG/1
2.2 INJECTION INTRAVENOUS ONCE
Qty: 0 | Refills: 0 | Status: COMPLETED | OUTPATIENT
Start: 2019-02-27 | End: 2019-02-27

## 2019-02-27 RX ADMIN — BORTEZOMIB 2.2 MILLIGRAM(S): 2.5 INJECTION INTRAVENOUS at 13:06

## 2019-02-28 LAB
ALBUMIN SERPL ELPH-MCNC: 4.4 G/DL
ALP BLD-CCNC: 38 U/L
ALT SERPL-CCNC: 11 U/L
ANION GAP SERPL CALC-SCNC: 13 MMOL/L
AST SERPL-CCNC: 15 U/L
BILIRUB SERPL-MCNC: 0.4 MG/DL
BUN SERPL-MCNC: 28 MG/DL
CALCIUM SERPL-MCNC: 9.6 MG/DL
CHLORIDE SERPL-SCNC: 107 MMOL/L
CO2 SERPL-SCNC: 22 MMOL/L
CREAT SERPL-MCNC: 1.1 MG/DL
GLUCOSE SERPL-MCNC: 101 MG/DL
HCT VFR BLD CALC: 31.5 %
HGB BLD-MCNC: 10.9 G/DL
MCHC RBC-ENTMCNC: 33.5 PG
MCHC RBC-ENTMCNC: 34.6 G/DL
MCV RBC AUTO: 96.9 FL
PLATELET # BLD AUTO: 234 K/UL
PMV BLD: 9 FL
POTASSIUM SERPL-SCNC: 4.2 MMOL/L
PROT SERPL-MCNC: 6.9 G/DL
RBC # BLD: 3.25 M/UL
RBC # FLD: 12.4 %
SODIUM SERPL-SCNC: 142 MMOL/L
WBC # FLD AUTO: 4.2 K/UL

## 2019-03-01 ENCOUNTER — OTHER (OUTPATIENT)
Age: 74
End: 2019-03-01

## 2019-03-01 ENCOUNTER — RESULT REVIEW (OUTPATIENT)
Age: 74
End: 2019-03-01

## 2019-03-01 LAB
ALBUMIN MFR SERPL ELPH: 61.7 %
ALBUMIN SERPL-MCNC: 4.3 G/DL
ALBUMIN/GLOB SERPL: 1.7 RATIO
ALPHA1 GLOB MFR SERPL ELPH: 4.4 %
ALPHA1 GLOB SERPL ELPH-MCNC: 0.3 G/DL
ALPHA2 GLOB MFR SERPL ELPH: 7.1 %
ALPHA2 GLOB SERPL ELPH-MCNC: 0.5 G/DL
B-GLOBULIN MFR SERPL ELPH: 11.2 %
B-GLOBULIN SERPL ELPH-MCNC: 0.8 G/DL
DEPRECATED KAPPA LC FREE/LAMBDA SER: 0.16 RATIO
GAMMA GLOB FLD ELPH-MCNC: 1.1 G/DL
GAMMA GLOB MFR SERPL ELPH: 15.6 %
IGA SER QL IEP: 56 MG/DL
IGG SER QL IEP: 1276 MG/DL
IGM SER QL IEP: 14 MG/DL
INTERPRETATION SERPL IEP-IMP: NORMAL
KAPPA LC CSF-MCNC: 7.13 MG/DL
KAPPA LC SERPL-MCNC: 1.17 MG/DL
M PROTEIN MFR SERPL ELPH: 8.7 %
M PROTEIN SPEC IFE-MCNC: NORMAL
MONOCLON BAND OBS SERPL: 0.6 G/DL
PROT SERPL-MCNC: 6.9 G/DL
PROT SERPL-MCNC: 6.9 G/DL

## 2019-03-01 NOTE — ASSESSMENT
[FreeTextEntry1] : IgG Lamda Multiple Myeloma with lytic bone lesions, anemia and hypercalcemia  on presentation, normal LDH,  Beta 2 7.2. Repeat levels were as follows , Albumin was 4.0. repeat Beta 2- 4.8\par  FISH- hyperploidy and gain of CCN1 which puts her at  standard risk. \par   PET/CT multiple FDG avid lytic lesion consistent with myeloma.\par -- Was on   VRd..  with low dose  she gets agitated on steroids\par -She achieved a VGPR and  was having issues with Dexa . Its hard to give her pills so now on Velcade maintenance , no neuropathy\par - The M-spike and lambda light chain is trending up but will continue current regimen as no clear progression \par \par Plan:\par - Velcade maintenance every 2 weeks; patient at 1 year of treatment as of December 2018\par - Zometa 3.5 mg  will go to every 3 months, due to receive in 2 weeks;  once myeloma progresses will go back to monthly \par - continue calcium and Vitamin D\par - on Acyclovir 400 mg BID\par \par Pathologic Right Humerus fracture s/p fixation - Dr. Glenn Salter\par \par Dementia - most likely Alzheimers \par - Neurology follow up Jessica Aguiar  fax \par - stable on SSRI and Seroquel\par - CT Brain with contrast was recommended by her Neurologist again , they are holding off at this point\par \par Anemia due to MM  and or treatment \par -stable, mild\par \par Hypercalcemia from MM\par -resolved \par \par Edema, Norvasc was stopped\par - edema has improved, no longer taking Lasix.  Norvasc was stopped. Compression stocking and elevation encouraged \par - c/w losartan 25 mg daily  with good BP control\par \par \par RTC in 1 month with CBC, CMP, serum immunoelectrophoresis if there are changes will call them

## 2019-03-01 NOTE — HISTORY OF PRESENT ILLNESS
[Therapy: ___] : Therapy: [unfilled] [Cycle: ___] : Cycle: [unfilled] [Day: ___] : Day: [unfilled] [Disease:__________________________] : Disease: [unfilled] [FreeTextEntry1] : VD was started  initially while we waited for R. On cycle 2 with R we stopped RD on  6/2018 now on maintenance Velcade. [de-identified] : 01/09/2018\par  Patient is here today with her family members for a follow up visit. So far got 4 doses of velcade and is currently on Day 8 of revlimid.  As per family, they noticed that her dementia is more worse  after starting VRD. Also reports Insomnia. She was evaluated by neurologist and so far, the  reason for her dementia is not established. Decision on Lumbar puncture is not yet made as neurologist did not think her symptoms were related to Normal pressure hydrocephalus.\par  ALso complaints of insomnia since being on VRD, likely from steriods. Her right arm has been healing well since the surgery and she will begin physical therapy soon. \par PET/CT results reviewed. Noted to have multiple FDG avid lytic lesions including right anterior second rib with SUV of 9.5.\par \par 1/25/18 \par She is here for follow up for her myeloma. Her  lambda started to fall and mspike went down as well she was only on Revlimid for 8 days,  Her GFR is also improving now 71.\par We decreased her dexamethasone to 16 mg po weekly due to psychosis. She now has mild ankle swelling in bilateral extremities. She now has urinary incontinence. Has bilateral hip pains in morning that improve with ambulation.  She started this cycle 2 on Teusday\par \par 2/15/18\par She is here for follow up. Her Neurologist in [facility] found a CT scan from [facility] back that also had hydrocephalus thus Alziemers is the cause of her dementia. [place] [facility] [place] with although their dosing recommendations was to move it to q28 days. We decided to keep things as is since they are use to this schedule. She small  blisters on eye lids small under 5 mm  one in each lower eye lid. They are not painful.  She is getting out of chair with ease. No other complaints> She also has repeat CT scan of head as per her neurologist results are bellow.\par \par 3/9/18\par She is here for follow up. She is dong well. She is here with her daughter. Trazodone is helping. No pains. He rash in the eyes has resolved they did not see optho.\par \par \par 3/28/18\par She is here for follow up with her son. She is doing well.  Son states she is physically better.  No complaints. Swelling in legs improved.\par \par 4/18/48\par She is here for follow up. She once again has Sty in her eyelids. It does not botehr her. she is doing well otherwise she has no pain.\par \par 5/10/18\par She is here for follow up with her daughter. HEr eyes are better using a wash. DId not see optho, Now on SSRI and Quatione?. Her light chain and Mspike keeps coming down\par \par 5/30/18 \par \par She is here for follow up. She is doing well. Her LIght chain is not normal. still had positive ROSALIND.  She started the Revlimid 3 days ago.\par \par 6/22/18\par She is here for follow up with daughter. She is having a hard time with mood swings with steroids so dexa was  stopped  it. \par \par 7/18/18\par She is here with her daughter. She is doing well. Still has some LE swelling.  No other issues. \par \par 9/12/18\par She is here for follow up. Doing well. HEre with her daughter. No new complaints. Myeloma panel stable \par \par 10/10/18\par Here with her daughter. Doing well. No issues> myeloma pane stable\par \par 11/7/18\par Patient is here for follow-up with her daughter.  Doing well.   No neuropathy.  We will continue monthly Zometa with one dose today.  We will also send the patient for a whole body PETscan.\par \par 12/5/18\par Patient is here for follow-up with her daughter.  The M-spike and lambda light chain is trending up but will continue current regimen.  The fact that there may be progression, we will continue with monthly Zometa for now.  They will do the PET/CT after the holidays.  Continue the Velcade. \par \par 1/30/19\par Patient is here for a follow-up visit with her son.  She is feeling well with no complaints.  Most recent CBC is stable.  Patient denies fever, chills, nausea, vomiting.  We will continue Zometa every 3 months.  The Lambda proteins are slowly increasing, now at 4.73 from 3.69 .  She is still taking the Acyclovir for prophylaxis.  \par She had a PET/CT to evaluate her bone disease that showed  Since December 14, 2017, overall decreased or resolved FDG uptake in  numerous lytic osseous lesions, consistent with good treatment response  (for example, 52% decrease in a right anterior second rib lesion now with  SUV of 4.6); highest SUV 5.8 in the right anterior 10th rib, 9% decrease.  2.  No new sites of abnormal FDG uptake.  \par \par 2/27/19\par Patient is here for a follow-up visit with her daughter.   Patient denies fever, chills, nausea, vomiting or any new rashes.  Her  passed away 2 weeks ago due to pulmonary fibrosis.  The daughter states that due to her dementia, she often forgets that he is not around and asks where he is.   [de-identified] : This is a 72 year old female with history of sick sinus syndrome s/p PPM and as well as progressive dementia over 1.5 years was to see a specialist in [facility] who presented to [facility] due to a fall  as a result of  worsening gait on 11/23/17.   Her work up demonstrated , left tibial swelling, Acute displaced fracture of the distal humeral diaphysis.\par Multiple lytic bone lesions.  CT neck Multiple lytic lesions,  CT head At least mild to moderate hydrocephalus involving lateral, third and fourth ventricles. Innumerable lytic lesions.\par \par She was seen by Neurology who recommended outpatient LP and  Orhto casted the right humerus fracture and recommended Oncologic Ortho evaluation.  \par \par On admission she was noted to be Hypercalcemic and in ADRY. She received pamidronate 60 mg once with IVF and her hypercalcemia  and ADRY  resolved.\par \par Work up demonstrated lambda 352, kappa 8.5 with ration of 0.02,  SPEP M spike of 2.7 , Beta 2 7.2. Urine Total protein of  284 H  Albumin was 4.0\par \par She underwent a bone marrow biopsy on 12/1/17 that showed  flow cytometry performed at [facility]    Oncology report - Clonal (IgG-Lambda) plasma cells (28.2% of total), normocellular to mildly hypercellular (30-40%)    with a sheet of plasma cells, a large aggregate of plasma cells and    markedly increased interstitial plasma cells.  Megakaryocytes are    present.  Maturing myeloid and erythroid precursors are present.  Amyloid    deposition is not identified.  Special stains for Amyloid (Crystal Violet    and Congo Red) are negative.  Stainable iron is present (1-2+).  \par \par She also had a left lower extremity hematoma evacuation.\par \par She is here with her kids

## 2019-03-01 NOTE — REASON FOR VISIT
[Follow-Up Visit] : a follow-up visit for [Family Member] : family member [FreeTextEntry2] : Multiple Myeloma

## 2019-03-12 ENCOUNTER — APPOINTMENT (OUTPATIENT)
Dept: INFUSION THERAPY | Facility: CLINIC | Age: 74
End: 2019-03-12

## 2019-03-12 ENCOUNTER — LABORATORY RESULT (OUTPATIENT)
Age: 74
End: 2019-03-12

## 2019-03-12 LAB
HCT VFR BLD CALC: 32.5 %
HGB BLD-MCNC: 11.3 G/DL
MCHC RBC-ENTMCNC: 33.6 PG
MCHC RBC-ENTMCNC: 34.8 G/DL
MCV RBC AUTO: 96.7 FL
PLATELET # BLD AUTO: 216 K/UL
PMV BLD: 9.7 FL
RBC # BLD: 3.36 M/UL
RBC # FLD: 12.8 %
WBC # FLD AUTO: 4.3 K/UL

## 2019-03-12 RX ORDER — ACETAMINOPHEN 500 MG
650 TABLET ORAL ONCE
Qty: 0 | Refills: 0 | Status: COMPLETED | OUTPATIENT
Start: 2019-03-12 | End: 2019-03-12

## 2019-03-12 RX ORDER — DARATUMUMAB 100 MG/5ML
545 INJECTION, SOLUTION, CONCENTRATE INTRAVENOUS ONCE
Qty: 0 | Refills: 0 | Status: COMPLETED | OUTPATIENT
Start: 2019-03-12 | End: 2019-03-12

## 2019-03-12 RX ORDER — DIPHENHYDRAMINE HCL 50 MG
25 CAPSULE ORAL ONCE
Qty: 0 | Refills: 0 | Status: COMPLETED | OUTPATIENT
Start: 2019-03-12 | End: 2019-03-12

## 2019-03-12 RX ORDER — HYDROCORTISONE 20 MG
100 TABLET ORAL ONCE
Qty: 0 | Refills: 0 | Status: COMPLETED | OUTPATIENT
Start: 2019-03-12 | End: 2019-03-12

## 2019-03-12 RX ADMIN — Medication 25 MILLIGRAM(S): at 11:13

## 2019-03-12 RX ADMIN — Medication 650 MILLIGRAM(S): at 11:13

## 2019-03-12 RX ADMIN — DARATUMUMAB 65.91 MILLIGRAM(S): 100 INJECTION, SOLUTION, CONCENTRATE INTRAVENOUS at 11:52

## 2019-03-12 RX ADMIN — Medication 100 MILLIGRAM(S): at 11:35

## 2019-03-12 RX ADMIN — DARATUMUMAB 545 MILLIGRAM(S): 100 INJECTION, SOLUTION, CONCENTRATE INTRAVENOUS at 13:00

## 2019-03-12 RX ADMIN — Medication 150 MILLIGRAM(S): at 11:13

## 2019-03-14 ENCOUNTER — APPOINTMENT (OUTPATIENT)
Dept: INFUSION THERAPY | Facility: CLINIC | Age: 74
End: 2019-03-14

## 2019-03-14 LAB
ALBUMIN SERPL ELPH-MCNC: 4.5 G/DL
ALP BLD-CCNC: 35 U/L
ALT SERPL-CCNC: 12 U/L
ANION GAP SERPL CALC-SCNC: 12 MMOL/L
AST SERPL-CCNC: 21 U/L
BILIRUB SERPL-MCNC: 0.4 MG/DL
BUN SERPL-MCNC: 30 MG/DL
CALCIUM SERPL-MCNC: 9.4 MG/DL
CHLORIDE SERPL-SCNC: 107 MMOL/L
CO2 SERPL-SCNC: 21 MMOL/L
CREAT SERPL-MCNC: 1.5 MG/DL
GLUCOSE SERPL-MCNC: 106 MG/DL
POTASSIUM SERPL-SCNC: 4.3 MMOL/L
PROT SERPL-MCNC: 6.9 G/DL
SODIUM SERPL-SCNC: 140 MMOL/L

## 2019-03-15 ENCOUNTER — APPOINTMENT (OUTPATIENT)
Dept: INFUSION THERAPY | Facility: CLINIC | Age: 74
End: 2019-03-15

## 2019-03-20 ENCOUNTER — APPOINTMENT (OUTPATIENT)
Dept: INFUSION THERAPY | Facility: CLINIC | Age: 74
End: 2019-03-20

## 2019-03-25 ENCOUNTER — RX RENEWAL (OUTPATIENT)
Age: 74
End: 2019-03-25

## 2019-03-27 ENCOUNTER — APPOINTMENT (OUTPATIENT)
Dept: INFUSION THERAPY | Facility: CLINIC | Age: 74
End: 2019-03-27

## 2019-03-27 ENCOUNTER — APPOINTMENT (OUTPATIENT)
Dept: HEMATOLOGY ONCOLOGY | Facility: CLINIC | Age: 74
End: 2019-03-27

## 2019-03-27 ENCOUNTER — RX RENEWAL (OUTPATIENT)
Age: 74
End: 2019-03-27

## 2019-04-03 ENCOUNTER — APPOINTMENT (OUTPATIENT)
Dept: HEMATOLOGY ONCOLOGY | Facility: CLINIC | Age: 74
End: 2019-04-03

## 2019-04-03 ENCOUNTER — APPOINTMENT (OUTPATIENT)
Dept: INFUSION THERAPY | Facility: CLINIC | Age: 74
End: 2019-04-03

## 2019-04-05 ENCOUNTER — RX RENEWAL (OUTPATIENT)
Age: 74
End: 2019-04-05

## 2019-04-10 ENCOUNTER — APPOINTMENT (OUTPATIENT)
Dept: INFUSION THERAPY | Facility: CLINIC | Age: 74
End: 2019-04-10

## 2019-04-17 ENCOUNTER — APPOINTMENT (OUTPATIENT)
Dept: INFUSION THERAPY | Facility: CLINIC | Age: 74
End: 2019-04-17

## 2019-04-19 ENCOUNTER — RX RENEWAL (OUTPATIENT)
Age: 74
End: 2019-04-19

## 2019-04-24 ENCOUNTER — APPOINTMENT (OUTPATIENT)
Dept: HEMATOLOGY ONCOLOGY | Facility: CLINIC | Age: 74
End: 2019-04-24

## 2019-04-24 ENCOUNTER — LABORATORY RESULT (OUTPATIENT)
Age: 74
End: 2019-04-24

## 2019-04-24 ENCOUNTER — OUTPATIENT (OUTPATIENT)
Dept: OUTPATIENT SERVICES | Facility: HOSPITAL | Age: 74
LOS: 1 days | Discharge: HOME | End: 2019-04-24

## 2019-04-24 ENCOUNTER — APPOINTMENT (OUTPATIENT)
Dept: INFUSION THERAPY | Facility: CLINIC | Age: 74
End: 2019-04-24

## 2019-04-24 VITALS
SYSTOLIC BLOOD PRESSURE: 117 MMHG | WEIGHT: 156 LBS | HEIGHT: 66 IN | HEART RATE: 85 BPM | RESPIRATION RATE: 14 BRPM | BODY MASS INDEX: 25.07 KG/M2 | DIASTOLIC BLOOD PRESSURE: 82 MMHG | TEMPERATURE: 97.1 F

## 2019-04-24 DIAGNOSIS — C90.00 MULTIPLE MYELOMA NOT HAVING ACHIEVED REMISSION: ICD-10-CM

## 2019-04-24 LAB
ALBUMIN SERPL ELPH-MCNC: 4.8 G/DL
ALP BLD-CCNC: 35 U/L
ALT SERPL-CCNC: 13 U/L
ANION GAP SERPL CALC-SCNC: 13 MMOL/L
AST SERPL-CCNC: 19 U/L
BILIRUB SERPL-MCNC: 0.3 MG/DL
BUN SERPL-MCNC: 29 MG/DL
CALCIUM SERPL-MCNC: 9 MG/DL
CHLORIDE SERPL-SCNC: 106 MMOL/L
CO2 SERPL-SCNC: 22 MMOL/L
CREAT SERPL-MCNC: 1.2 MG/DL
GLUCOSE SERPL-MCNC: 84 MG/DL
HCT VFR BLD CALC: 34.1 %
HGB BLD-MCNC: 12.2 G/DL
MCHC RBC-ENTMCNC: 34.9 PG
MCHC RBC-ENTMCNC: 35.8 G/DL
MCV RBC AUTO: 97.4 FL
PLATELET # BLD AUTO: 273 K/UL
PMV BLD: 9.7 FL
POTASSIUM SERPL-SCNC: 4.4 MMOL/L
PROT SERPL-MCNC: 7 G/DL
RBC # BLD: 3.5 M/UL
RBC # FLD: 12.8 %
SODIUM SERPL-SCNC: 141 MMOL/L
WBC # FLD AUTO: 4.16 K/UL

## 2019-04-24 RX ORDER — ACYCLOVIR 400 MG/1
400 TABLET ORAL
Qty: 60 | Refills: 6 | Status: COMPLETED | COMMUNITY
Start: 2018-07-09 | End: 2019-04-24

## 2019-04-24 RX ORDER — BORTEZOMIB 3.5 MG/1
3.5 INJECTION, POWDER, LYOPHILIZED, FOR SOLUTION INTRAVENOUS; SUBCUTANEOUS
Refills: 0 | Status: COMPLETED | COMMUNITY
End: 2019-04-24

## 2019-04-25 NOTE — PHYSICAL EXAM
[Restricted in physically strenuous activity but ambulatory and able to carry out work of a light or sedentary nature] : Status 1- Restricted in physically strenuous activity but ambulatory and able to carry out work of a light or sedentary nature, e.g., light house work, office work [Normal] : no peripheral adenopathy appreciated [de-identified] : PPM [de-identified] : trace edema in Lower extremities on Lasix if needed  [de-identified] :  right arm scar [de-identified] : dementia, incontinence, ambulation difficulties but stable

## 2019-04-25 NOTE — REASON FOR VISIT
[Family Member] : family member [Follow-Up Visit] : a follow-up visit for [FreeTextEntry2] : Multiple Myeloma

## 2019-04-25 NOTE — HISTORY OF PRESENT ILLNESS
[Cycle: ___] : Cycle: [unfilled] [Therapy: ___] : Therapy: [unfilled] [Day: ___] : Day: [unfilled] [Disease:__________________________] : Disease: [unfilled] [FreeTextEntry1] : VD was started  initially while we waited for R. On cycle 2 with R we stopped RD on  6/2018 and was on  maintenance Velcade progressed did not tolerate Daratumumab due to infusion time and was agitated and Started Pomylist on March 29 2019  [de-identified] : 01/09/2018\par  Patient is here today with her family members for a follow up visit. So far got 4 doses of velcade and is currently on Day 8 of revlimid.  As per family, they noticed that her dementia is more worse  after starting VRD. Also reports Insomnia. She was evaluated by neurologist and so far, the  reason for her dementia is not established. Decision on Lumbar puncture is not yet made as neurologist did not think her symptoms were related to Normal pressure hydrocephalus.\par  ALso complaints of insomnia since being on VRD, likely from steriods. Her right arm has been healing well since the surgery and she will begin physical therapy soon. \par PET/CT results reviewed. Noted to have multiple FDG avid lytic lesions including right anterior second rib with SUV of 9.5.\par \par 1/25/18 \par She is here for follow up for her myeloma. Her  lambda started to fall and mspike went down as well she was only on Revlimid for 8 days,  Her GFR is also improving now 71.\par We decreased her dexamethasone to 16 mg po weekly due to psychosis. She now has mild ankle swelling in bilateral extremities. She now has urinary incontinence. Has bilateral hip pains in morning that improve with ambulation.  She started this cycle 2 on Teusday\par \par 2/15/18\par She is here for follow up. Her Neurologist in [facility] found a CT scan from [facility] back that also had hydrocephalus thus Alziemers is the cause of her dementia. [place] [facility] [place] agreed with although their dosing recommendations was to move it to q28 days. We decided to keep things as is since they are use to this schedule. She small  blisters on eye lids small under 5 mm  one in each lower eye lid. They are not painful.  She is getting out of chair with ease. No other complaints> She also has repeat CT scan of head as per her neurologist results are bellow.\par \par 3/9/18\par She is here for follow up. She is dong well. She is here with her daughter. Trazodone is helping. No pains. He rash in the eyes has resolved they did not see optho.\par \par \par 3/28/18\par She is here for follow up with her son. She is doing well.  Son states she is physically better.  No complaints. Swelling in legs improved.\par \par 4/18/48\par She is here for follow up. She once again has Sty in her eyelids. It does not botehr her. she is doing well otherwise she has no pain.\par \par 5/10/18\par She is here for follow up with her daughter. HEr eyes are better using a wash. DId not see optho, Now on SSRI and Quatione?. Her light chain and Mspike keeps coming down\par \par 5/30/18 \par \par She is here for follow up. She is doing well. Her LIght chain is not normal. still had positive ROSALIND.  She started the Revlimid 3 days ago.\par \par 6/22/18\par She is here for follow up with daughter. She is having a hard time with mood swings with steroids so dexa was  stopped  it. \par \par 7/18/18\par She is here with her daughter. She is doing well. Still has some LE swelling.  No other issues. \par \par 9/12/18\par She is here for follow up. Doing well. HEre with her daughter. No new complaints. Myeloma panel stable \par \par 10/10/18\par Here with her daughter. Doing well. No issues> myeloma pane stable\par \par 11/7/18\par Patient is here for follow-up with her daughter.  Doing well.   No neuropathy.  We will continue monthly Zometa with one dose today.  We will also send the patient for a whole body PETscan.\par \par 12/5/18\par Patient is here for follow-up with her daughter.  The M-spike and lambda light chain is trending up but will continue current regimen.  The fact that there may be progression, we will continue with monthly Zometa for now.  They will do the PET/CT after the holidays.  Continue the Velcade. \par \par 1/30/19\par Patient is here for a follow-up visit with her son.  She is feeling well with no complaints.  Most recent CBC is stable.  Patient denies fever, chills, nausea, vomiting.  We will continue Zometa every 3 months.  The Lambda proteins are slowly increasing, now at 4.73 from 3.69 .  She is still taking the Acyclovir for prophylaxis.  \par She had a PET/CT to evaluate her bone disease that showed  Since December 14, 2017, overall decreased or resolved FDG uptake in  numerous lytic osseous lesions, consistent with good treatment response  (for example, 52% decrease in a right anterior second rib lesion now with  SUV of 4.6); highest SUV 5.8 in the right anterior 10th rib, 9% decrease.  2.  No new sites of abnormal FDG uptake.  \par \par 2/27/19\par Patient is here for a follow-up visit with her daughter.   Patient denies fever, chills, nausea, vomiting or any new rashes.  Her  passed away 2 weeks ago due to pulmonary fibrosis.  The daughter states that due to her dementia, she often forgets that he is not around and asks where he is.  \par \par 4/24/19\par She is here for follow up. Her involved light chain started to increase and was consistent on repeat testing. We stopped Velcade she not tolerate Daratumumab due to infusion time and was agitated and Started Pomylist on March 29 2019. She is doing well.  We did not use a steroid since she gets agitated. No complaints  [de-identified] : This is a 72 year old female with history of sick sinus syndrome s/p PPM and as well as progressive dementia over 1.5 years was to see a specialist in [facility] who presented to [facility] due to a fall  as a result of  worsening gait on 11/23/17.   Her work up demonstrated , left tibial swelling, Acute displaced fracture of the distal humeral diaphysis.\par Multiple lytic bone lesions.  CT neck Multiple lytic lesions,  CT head At least mild to moderate hydrocephalus involving lateral, third and fourth ventricles. Innumerable lytic lesions.\par \par She was seen by Neurology who recommended outpatient LP and  Orhto casted the right humerus fracture and recommended Oncologic Ortho evaluation.  \par \par On admission she was noted to be Hypercalcemic and in ADRY. She received pamidronate 60 mg once with IVF and her hypercalcemia  and ADRY  resolved.\par \par Work up demonstrated lambda 352, kappa 8.5 with ration of 0.02,  SPEP M spike of 2.7 , Beta 2 7.2. Urine Total protein of  284 H  Albumin was 4.0\par \par She underwent a bone marrow biopsy on 12/1/17 that showed  flow cytometry performed at [facility]    Oncology report - Clonal (IgG-Lambda) plasma cells (28.2% of total), normocellular to mildly hypercellular (30-40%)    with a sheet of plasma cells, a large aggregate of plasma cells and    markedly increased interstitial plasma cells.  Megakaryocytes are    present.  Maturing myeloid and erythroid precursors are present.  Amyloid    deposition is not identified.  Special stains for Amyloid (Crystal Violet    and Congo Red) are negative.  Stainable iron is present (1-2+).  \par \par She also had a left lower extremity hematoma evacuation.\par \par She is here with her kids

## 2019-04-25 NOTE — CONSULT LETTER
[Dear  ___] : Dear  [unfilled], [Please see my note below.] : Please see my note below. [Referral Letter:] : I am referring [unfilled] to you for further evaluation.  My most recent evaluation follows. [Sincerely,] : Sincerely, [Referral Closing:] : Thank you very much for seeing this patient.  If you have any questions, please do not hesitate to contact me. [FreeTextEntry3] : Hemanth

## 2019-04-25 NOTE — ASSESSMENT
[FreeTextEntry1] : IgG Lamda Multiple Myeloma with lytic bone lesions, anemia and hypercalcemia  on presentation, normal LDH,  Beta 2 7.2. Repeat levels were as follows , Albumin was 4.0. repeat Beta 2- 4.8\par  FISH- hyperploidy and gain of CCN1 which puts her at  standard risk. \par   PET/CT multiple FDG avid lytic lesion consistent with myeloma.\par -- Was on   VRd..  with low dose  she gets agitated on steroids\par -She achieved a VGPR and  was having issues with Dexa . Its hard to give her pills so now on Velcade maintenance , no neuropathy\par - The M-spike and lambda light chain  continue to trend up and she  is no on Polemist to start cycle 2 \par \par Plan:\par - c/w Polemist \par - Zometa 3.5 mg  will go to every 3 months,  will  hold off monthly  dosing \par - continue calcium and Vitamin D\par - off accyclovir now, on Aspirin \par \par Pathologic Right Humerus fracture s/p fixation - Dr. Glenn Salter\par \par Dementia - most likely Alzheimers \par - Neurology follow up Jessica Aguiar  fax \par - stable on SSRI and Seroquel\par - CT Brain with contrast was recommended by her Neurologist again , they are holding off at this point\par \par Anemia due to MM  and or treatment \par -stable, mild\par \par Hypercalcemia from MM\par -resolved \par \par Edema, Norvasc was stopped\par - edema has improved, no longer taking Lasix.  Norvasc was stopped. Compression stocking and elevation encouraged \par - c/w losartan 25 mg daily  with good BP control\par \par \par RTC in 1 month with CBC, CMP, serum immunoelectrophoresis if there are changes will call them

## 2019-04-25 NOTE — REVIEW OF SYSTEMS
[Lower Ext Edema] : lower extremity edema [Incontinence] : incontinence [Dry Eyes] : dryness of the eyes [Confused] : confusion [Difficulty Walking] : difficulty walking [Negative] : Heme/Lymph [Fatigue] : no fatigue [Recent Change In Weight] : ~T no recent weight change [Joint Pain] : no joint pain [Muscle Pain] : no muscle pain [FreeTextEntry5] : Edema has moderately improved [de-identified] : Dementia

## 2019-04-26 LAB
ALBUMIN MFR SERPL ELPH: 63.4 %
ALBUMIN SERPL-MCNC: 4.4 G/DL
ALBUMIN/GLOB SERPL: 1.7 RATIO
ALPHA1 GLOB MFR SERPL ELPH: 4.2 %
ALPHA1 GLOB SERPL ELPH-MCNC: 0.3 G/DL
ALPHA2 GLOB MFR SERPL ELPH: 5.8 %
ALPHA2 GLOB SERPL ELPH-MCNC: 0.4 G/DL
B-GLOBULIN MFR SERPL ELPH: 12 %
B-GLOBULIN SERPL ELPH-MCNC: 0.8 G/DL
DEPRECATED KAPPA LC FREE/LAMBDA SER: 0.39 RATIO
GAMMA GLOB FLD ELPH-MCNC: 1 G/DL
GAMMA GLOB MFR SERPL ELPH: 14.6 %
IGA SER QL IEP: 26 MG/DL
IGG SER QL IEP: 1090 MG/DL
IGM SER QL IEP: 13 MG/DL
INTERPRETATION SERPL IEP-IMP: NORMAL
KAPPA LC CSF-MCNC: 4.7 MG/DL
KAPPA LC SERPL-MCNC: 1.84 MG/DL
M PROTEIN MFR SERPL ELPH: 8.3 %
M PROTEIN SPEC IFE-MCNC: NORMAL
MONOCLON BAND OBS SERPL: 0.6 G/DL
PROT SERPL-MCNC: 7 G/DL
PROT SERPL-MCNC: 7.5 G/DL

## 2019-05-07 ENCOUNTER — RX RENEWAL (OUTPATIENT)
Age: 74
End: 2019-05-07

## 2019-05-08 ENCOUNTER — RX RENEWAL (OUTPATIENT)
Age: 74
End: 2019-05-08

## 2019-05-22 ENCOUNTER — APPOINTMENT (OUTPATIENT)
Dept: INFUSION THERAPY | Facility: CLINIC | Age: 74
End: 2019-05-22

## 2019-05-22 ENCOUNTER — LABORATORY RESULT (OUTPATIENT)
Age: 74
End: 2019-05-22

## 2019-05-22 ENCOUNTER — APPOINTMENT (OUTPATIENT)
Dept: HEMATOLOGY ONCOLOGY | Facility: CLINIC | Age: 74
End: 2019-05-22

## 2019-05-22 VITALS
DIASTOLIC BLOOD PRESSURE: 60 MMHG | BODY MASS INDEX: 24.59 KG/M2 | SYSTOLIC BLOOD PRESSURE: 90 MMHG | HEART RATE: 80 BPM | RESPIRATION RATE: 14 BRPM | WEIGHT: 153 LBS | HEIGHT: 66 IN

## 2019-05-22 LAB
ALBUMIN SERPL ELPH-MCNC: 4.4 G/DL
ALP BLD-CCNC: 38 U/L
ALT SERPL-CCNC: 16 U/L
ANION GAP SERPL CALC-SCNC: 14 MMOL/L
AST SERPL-CCNC: 20 U/L
BILIRUB SERPL-MCNC: 0.3 MG/DL
BUN SERPL-MCNC: 38 MG/DL
CALCIUM SERPL-MCNC: 8.7 MG/DL
CHLORIDE SERPL-SCNC: 102 MMOL/L
CO2 SERPL-SCNC: 21 MMOL/L
CREAT SERPL-MCNC: 1.4 MG/DL
GLUCOSE SERPL-MCNC: 95 MG/DL
HCT VFR BLD CALC: 32.3 %
HGB BLD-MCNC: 11 G/DL
MCHC RBC-ENTMCNC: 32.9 PG
MCHC RBC-ENTMCNC: 34.1 G/DL
MCV RBC AUTO: 96.7 FL
PLATELET # BLD AUTO: 237 K/UL
PMV BLD: 9.1 FL
POTASSIUM SERPL-SCNC: 4.5 MMOL/L
PROT SERPL-MCNC: 6.6 G/DL
RBC # BLD: 3.34 M/UL
RBC # FLD: 12.7 %
SODIUM SERPL-SCNC: 137 MMOL/L
WBC # FLD AUTO: 4.74 K/UL

## 2019-05-22 RX ORDER — ZOLEDRONIC ACID 5 MG/100ML
3.5 INJECTION, SOLUTION INTRAVENOUS ONCE
Refills: 0 | Status: COMPLETED | OUTPATIENT
Start: 2019-05-22 | End: 2019-05-22

## 2019-05-22 RX ADMIN — ZOLEDRONIC ACID 200 MILLIGRAM(S): 5 INJECTION, SOLUTION INTRAVENOUS at 12:30

## 2019-05-22 RX ADMIN — ZOLEDRONIC ACID 3.5 MILLIGRAM(S): 5 INJECTION, SOLUTION INTRAVENOUS at 12:45

## 2019-05-24 LAB
ALBUMIN MFR SERPL ELPH: 60.9 %
ALBUMIN SERPL-MCNC: 4 G/DL
ALBUMIN/GLOB SERPL: 1.5 RATIO
ALPHA1 GLOB MFR SERPL ELPH: 4.8 %
ALPHA1 GLOB SERPL ELPH-MCNC: 0.3 G/DL
ALPHA2 GLOB MFR SERPL ELPH: 8.1 %
ALPHA2 GLOB SERPL ELPH-MCNC: 0.5 G/DL
B-GLOBULIN MFR SERPL ELPH: 12.9 %
B-GLOBULIN SERPL ELPH-MCNC: 0.9 G/DL
DEPRECATED KAPPA LC FREE/LAMBDA SER: 0.43 RATIO
GAMMA GLOB FLD ELPH-MCNC: 0.9 G/DL
GAMMA GLOB MFR SERPL ELPH: 13.3 %
IGA SER QL IEP: 41 MG/DL
IGG SER QL IEP: 988 MG/DL
IGM SER QL IEP: 11 MG/DL
INTERPRETATION SERPL IEP-IMP: NORMAL
KAPPA LC CSF-MCNC: 4.4 MG/DL
KAPPA LC SERPL-MCNC: 1.88 MG/DL
M PROTEIN MFR SERPL ELPH: 7.3 %
M PROTEIN SPEC IFE-MCNC: NORMAL
MONOCLON BAND OBS SERPL: 0.5 G/DL
PROT SERPL-MCNC: 6.6 G/DL

## 2019-05-24 NOTE — REVIEW OF SYSTEMS
[Dry Eyes] : dryness of the eyes [Lower Ext Edema] : lower extremity edema [Incontinence] : incontinence [Confused] : confusion [Difficulty Walking] : difficulty walking [Negative] : Heme/Lymph [Joint Pain] : joint pain [Fatigue] : no fatigue [Muscle Pain] : no muscle pain [Recent Change In Weight] : ~T no recent weight change [FreeTextEntry5] : Edema has moderately improved [FreeTextEntry8] : worsening of incontinence, likely from worsening dementa [FreeTextEntry9] : intermittent bone pain in both thighs [de-identified] : Dementia

## 2019-05-24 NOTE — HISTORY OF PRESENT ILLNESS
[Therapy: ___] : Therapy: [unfilled] [Cycle: ___] : Cycle: [unfilled] [Day: ___] : Day: [unfilled] [Disease:__________________________] : Disease: [unfilled] [FreeTextEntry1] : Started Pomalyst in 03/2019.\par VD was started  initially while we waited for R. On cycle 2 with R we stopped RD on  6/2018 and was on  maintenance Velcade progressed did not tolerate Daratumumab due to infusion time and was agitated and Started Pomalyst on March 29 2019  [de-identified] : 01/09/2018\par  Patient is here today with her family members for a follow up visit. So far got 4 doses of velcade and is currently on Day 8 of revlimid.  As per family, they noticed that her dementia is more worse  after starting VRD. Also reports Insomnia. She was evaluated by neurologist and so far, the  reason for her dementia is not established. Decision on Lumbar puncture is not yet made as neurologist did not think her symptoms were related to Normal pressure hydrocephalus.\par  ALso complaints of insomnia since being on VRD, likely from steriods. Her right arm has been healing well since the surgery and she will begin physical therapy soon. \par PET/CT results reviewed. Noted to have multiple FDG avid lytic lesions including right anterior second rib with SUV of 9.5.\par \par 1/25/18 \par She is here for follow up for her myeloma. Her  lambda started to fall and mspike went down as well she was only on Revlimid for 8 days,  Her GFR is also improving now 71.\par We decreased her dexamethasone to 16 mg po weekly due to psychosis. She now has mild ankle swelling in bilateral extremities. She now has urinary incontinence. Has bilateral hip pains in morning that improve with ambulation.  She started this cycle 2 on Teusday\par \par 2/15/18\par She is here for follow up. Her Neurologist in [facility] found a CT scan from [facility] back that also had hydrocephalus thus Alziemers is the cause of her dementia. They were [place] in [facility] and [place]RD was agreed with although their dosing recommendations was to move it to q28 days. We decided to keep things as is since they are use to this schedule. She small  blisters on eye lids small under 5 mm  one in each lower eye lid. They are not painful.  She is getting out of chair with ease. No other complaints> She also has repeat CT scan of head as per her neurologist results are bellow.\par \par 3/9/18\par She is here for follow up. She is dong well. She is here with her daughter. Trazodone is helping. No pains. He rash in the eyes has resolved they did not see optho.\par \par \par 3/28/18\par She is here for follow up with her son. She is doing well.  Son states she is physically better.  No complaints. Swelling in legs improved.\par \par 4/18/48\par She is here for follow up. She once again has Sty in her eyelids. It does not botehr her. she is doing well otherwise she has no pain.\par \par 5/10/18\par She is here for follow up with her daughter. HEr eyes are better using a wash. DId not see optho, Now on SSRI and Quatione?. Her light chain and Mspike keeps coming down\par \par 5/30/18 \par \par She is here for follow up. She is doing well. Her LIght chain is not normal. still had positive ROSALIND.  She started the Revlimid 3 days ago.\par \par 6/22/18\par She is here for follow up with daughter. She is having a hard time with mood swings with steroids so dexa was  stopped  it. \par \par 7/18/18\par She is here with her daughter. She is doing well. Still has some LE swelling.  No other issues. \par \par 9/12/18\par She is here for follow up. Doing well. HEre with her daughter. No new complaints. Myeloma panel stable \par \par 10/10/18\par Here with her daughter. Doing well. No issues> myeloma pane stable\par \par 11/7/18\par Patient is here for follow-up with her daughter.  Doing well.   No neuropathy.  We will continue monthly Zometa with one dose today.  We will also send the patient for a whole body PETscan.\par \par 12/5/18\par Patient is here for follow-up with her daughter.  The M-spike and lambda light chain is trending up but will continue current regimen.  The fact that there may be progression, we will continue with monthly Zometa for now.  They will do the PET/CT after the holidays.  Continue the Velcade. \par \par 1/30/19\par Patient is here for a follow-up visit with her son.  She is feeling well with no complaints.  Most recent CBC is stable.  Patient denies fever, chills, nausea, vomiting.  We will continue Zometa every 3 months.  The Lambda proteins are slowly increasing, now at 4.73 from 3.69 .  She is still taking the Acyclovir for prophylaxis.  \par She had a PET/CT to evaluate her bone disease that showed  Since December 14, 2017, overall decreased or resolved FDG uptake in  numerous lytic osseous lesions, consistent with good treatment response  (for example, 52% decrease in a right anterior second rib lesion now with  SUV of 4.6); highest SUV 5.8 in the right anterior 10th rib, 9% decrease.  2.  No new sites of abnormal FDG uptake.  \par \par 2/27/19\par Patient is here for a follow-up visit with her daughter.   Patient denies fever, chills, nausea, vomiting or any new rashes.  Her  passed away 2 weeks ago due to pulmonary fibrosis.  The daughter states that due to her dementia, she often forgets that he is not around and asks where he is.  \par \par 4/24/19\par She is here for follow up. Her involved light chain started to increase and was consistent on repeat testing. We stopped Velcade she not tolerate Daratumumab due to infusion time and was agitated and Started Pomaylst on March 29 2019. She is doing well.  We did not use a steroid since she gets agitated. No complaints.\par \par 5/22/19\par Patient is here for a follow-up visit with her daughter.  No new complaints.  Patient denies fever, chills, nausea, vomiting, or rashes.   She is tolerating the Pomalyst, her Lamda light chains are decreasing, they are 4.70 on 4/24.   [de-identified] : This is a 72 year old female with history of sick sinus syndrome s/p PPM and as well as progressive dementia over 1.5 years was to see a specialist in [facility] who presented to [facility] due to a fall  as a result of  worsening gait on 11/23/17.   Her work up demonstrated , left tibial swelling, Acute displaced fracture of the distal humeral diaphysis.\par Multiple lytic bone lesions.  CT neck Multiple lytic lesions,  CT head At least mild to moderate hydrocephalus involving lateral, third and fourth ventricles. Innumerable lytic lesions.\par \par She was seen by Neurology who recommended outpatient LP and  Orhto casted the right humerus fracture and recommended Oncologic Ortho evaluation.  \par \par On admission she was noted to be Hypercalcemic and in ADRY. She received pamidronate 60 mg once with IVF and her hypercalcemia  and ADRY  resolved.\par \par Work up demonstrated lambda 352, kappa 8.5 with ration of 0.02,  SPEP M spike of 2.7 , Beta 2 7.2. Urine Total protein of  284 H  Albumin was 4.0\par \par She underwent a bone marrow biopsy on 12/1/17 that showed  flow cytometry performed at [facility]    Oncology report - Clonal (IgG-Lambda) plasma cells (28.2% of total), normocellular to mildly hypercellular (30-40%)    with a sheet of plasma cells, a large aggregate of plasma cells and    markedly increased interstitial plasma cells.  Megakaryocytes are    present.  Maturing myeloid and erythroid precursors are present.  Amyloid    deposition is not identified.  Special stains for Amyloid (Crystal Violet    and Congo Red) are negative.  Stainable iron is present (1-2+).  \par \par She also had a left lower extremity hematoma evacuation.\par \par She is here with her kids

## 2019-05-24 NOTE — ASSESSMENT
[FreeTextEntry1] : #IgG Lamda Multiple Myeloma with lytic bone lesions, anemia and hypercalcemia  on presentation, normal LDH,  Beta 2 7.2. Repeat levels were as follows , Albumin was 4.0. repeat Beta 2- 4.8\par  FISH- hyperploidy and gain of CCN1 which puts her at  standard risk. \par   PET/CT multiple FDG avid lytic lesion consistent with myeloma.\par - Was on  VRd..  with low dose  she gets agitated on steroids\par -She achieved a VGPR and  was having issues with Dexa . Its hard to give her pills so now on Velcade maintenance , no neuropathy\par - The M-spike and lambda light chains is now coming down on treatment \par \par Plan:\par - c/w Pomalyst, she has next refill already\par - c/w Zometa 3.5 mg  every 3 months\par - continue calcium and Vitamin D\par - off accyclovir, continue on Aspirin \par \par Pathologic Right Humerus fracture s/p fixation - Dr. Glenn Salter\par \par Dementia - most likely Alzheimers \par - Neurology following Jessica Aguiar  fax \par - stable on SSRI and Seroquel\par - CT Brain with contrast was recommended by her Neurologist again , they are holding off at this point\par \par Anemia due to MM  and or treatment \par - stable, mild\par \par Hypercalcemia from MM\par - resolved \par \par Edema, Norvasc was stopped\par - edema has improved, no longer taking Lasix.  Norvasc was stopped. Compression stocking and elevation encouraged \par - c/w losartan 25 mg daily with good BP control, BP low on exam today but not asymptomatic; if still low and symptomatic we will dose reduce\par \par RTC in 1 month with CBC, CMP, serum immunoelectrophoresis if there are changes will call them

## 2019-05-24 NOTE — PHYSICAL EXAM
[Restricted in physically strenuous activity but ambulatory and able to carry out work of a light or sedentary nature] : Status 1- Restricted in physically strenuous activity but ambulatory and able to carry out work of a light or sedentary nature, e.g., light house work, office work [Normal] : normal appearance, no rash, nodules, vesicles, ulcers, erythema [de-identified] : trace edema in Lower extremities on Lasix if needed  [de-identified] : PPM [de-identified] :  right arm scar [de-identified] : dementia, incontinence, ambulation difficulties but stable

## 2019-06-05 ENCOUNTER — RX RENEWAL (OUTPATIENT)
Age: 74
End: 2019-06-05

## 2019-06-14 ENCOUNTER — APPOINTMENT (OUTPATIENT)
Dept: CARDIOLOGY | Facility: CLINIC | Age: 74
End: 2019-06-14

## 2019-06-14 ENCOUNTER — RX RENEWAL (OUTPATIENT)
Age: 74
End: 2019-06-14

## 2019-06-18 ENCOUNTER — RX RENEWAL (OUTPATIENT)
Age: 74
End: 2019-06-18

## 2019-06-19 ENCOUNTER — LABORATORY RESULT (OUTPATIENT)
Age: 74
End: 2019-06-19

## 2019-06-19 ENCOUNTER — APPOINTMENT (OUTPATIENT)
Dept: HEMATOLOGY ONCOLOGY | Facility: CLINIC | Age: 74
End: 2019-06-19
Payer: MEDICARE

## 2019-06-19 VITALS
WEIGHT: 150 LBS | HEART RATE: 94 BPM | SYSTOLIC BLOOD PRESSURE: 121 MMHG | DIASTOLIC BLOOD PRESSURE: 58 MMHG | HEIGHT: 66 IN | BODY MASS INDEX: 24.11 KG/M2 | RESPIRATION RATE: 14 BRPM | TEMPERATURE: 96.9 F

## 2019-06-19 PROCEDURE — 99213 OFFICE O/P EST LOW 20 MIN: CPT

## 2019-06-19 RX ORDER — IRBESARTAN 150 MG/1
150 TABLET ORAL DAILY
Qty: 90 | Refills: 1 | Status: DISCONTINUED | COMMUNITY
Start: 2019-01-30 | End: 2019-06-19

## 2019-06-20 LAB
ALBUMIN SERPL ELPH-MCNC: 4.4 G/DL
ALP BLD-CCNC: 50 U/L
ALT SERPL-CCNC: 15 U/L
ANION GAP SERPL CALC-SCNC: 16 MMOL/L
AST SERPL-CCNC: 21 U/L
BILIRUB SERPL-MCNC: 0.3 MG/DL
BUN SERPL-MCNC: 54 MG/DL
CALCIUM SERPL-MCNC: 9.2 MG/DL
CHLORIDE SERPL-SCNC: 103 MMOL/L
CO2 SERPL-SCNC: 20 MMOL/L
CREAT SERPL-MCNC: 2 MG/DL
GLUCOSE SERPL-MCNC: 72 MG/DL
HCT VFR BLD CALC: 33 %
HGB BLD-MCNC: 10.6 G/DL
MCHC RBC-ENTMCNC: 32.1 G/DL
MCHC RBC-ENTMCNC: 33.8 PG
MCV RBC AUTO: 105.1 FL
PLATELET # BLD AUTO: 238 K/UL
PMV BLD: 9.1 FL
POTASSIUM SERPL-SCNC: 4.6 MMOL/L
PROT SERPL-MCNC: 7 G/DL
RBC # BLD: 3.14 M/UL
RBC # FLD: 13 %
SODIUM SERPL-SCNC: 139 MMOL/L
WBC # FLD AUTO: 4.91 K/UL

## 2019-06-20 NOTE — REVIEW OF SYSTEMS
[Dry Eyes] : dryness of the eyes [Lower Ext Edema] : lower extremity edema [Incontinence] : incontinence [Confused] : confusion [Difficulty Walking] : difficulty walking [Negative] : Heme/Lymph [Muscle Weakness] : muscle weakness [Fatigue] : no fatigue [Recent Change In Weight] : ~T no recent weight change [Joint Pain] : no joint pain [Muscle Pain] : no muscle pain [FreeTextEntry5] : Edema has moderately improved [FreeTextEntry8] : worsening of incontinence, likely from worsening dementa [de-identified] : Dementia

## 2019-06-20 NOTE — HISTORY OF PRESENT ILLNESS
[Therapy: ___] : Therapy: [unfilled] [Cycle: ___] : Cycle: [unfilled] [Day: ___] : Day: [unfilled] [Disease:__________________________] : Disease: [unfilled] [FreeTextEntry1] : Started Pomalyst in 03/2019.\par VD was started  initially while we waited for R. On cycle 2 with R we stopped RD on  6/2018 and was on  maintenance Velcade progressed did not tolerate Daratumumab due to infusion time and was agitated and Started Pomalyst on March 29 2019  [de-identified] : 01/09/2018\par  Patient is here today with her family members for a follow up visit. So far got 4 doses of velcade and is currently on Day 8 of revlimid.  As per family, they noticed that her dementia is more worse  after starting VRD. Also reports Insomnia. She was evaluated by neurologist and so far, the  reason for her dementia is not established. Decision on Lumbar puncture is not yet made as neurologist did not think her symptoms were related to Normal pressure hydrocephalus.\par  ALso complaints of insomnia since being on VRD, likely from steriods. Her right arm has been healing well since the surgery and she will begin physical therapy soon. \par PET/CT results reviewed. Noted to have multiple FDG avid lytic lesions including right anterior second rib with SUV of 9.5.\par \par 1/25/18 \par She is here for follow up for her myeloma. Her  lambda started to fall and mspike went down as well she was only on Revlimid for 8 days,  Her GFR is also improving now 71.\par We decreased her dexamethasone to 16 mg po weekly due to psychosis. She now has mild ankle swelling in bilateral extremities. She now has urinary incontinence. Has bilateral hip pains in morning that improve with ambulation.  She started this cycle 2 on Teusday\par \par 2/15/18\par She is here for follow up. Her Neurologist in [facility] found a CT scan from [facility] back that also had hydrocephalus thus Alziemers is the cause of her dementia. They were seen in [facility] and VRD was agreed with although their dosing recommendations was to move it to q28 days. We decided to keep things as is since they are use to this schedule. She small  blisters on eye lids small under 5 mm  one in each lower eye lid. They are not painful.  She is getting out of chair with ease. No other complaints> She also has repeat CT scan of head as per her neurologist results are bellow.\par \par 3/9/18\par She is here for follow up. She is dong well. She is here with her daughter. Trazodone is helping. No pains. He rash in the eyes has resolved they did not see optho.\par \par \par 3/28/18\par She is here for follow up with her son. She is doing well.  Son states she is physically better.  No complaints. Swelling in legs improved.\par \par 4/18/48\par She is here for follow up. She once again has Sty in her eyelids. It does not botehr her. she is doing well otherwise she has no pain.\par \par 5/10/18\par She is here for follow up with her daughter. HEr eyes are better using a wash. DId not see optho, Now on SSRI and Quatione?. Her light chain and Mspike keeps coming down\par \par 5/30/18 \par \par She is here for follow up. She is doing well. Her LIght chain is not normal. still had positive ROSALIND.  She started the Revlimid 3 days ago.\par \par 6/22/18\par She is here for follow up with daughter. She is having a hard time with mood swings with steroids so dexa was  stopped  it. \par \par 7/18/18\par She is here with her daughter. She is doing well. Still has some LE swelling.  No other issues. \par \par 9/12/18\par She is here for follow up. Doing well. HEre with her daughter. No new complaints. Myeloma panel stable \par \par 10/10/18\par Here with her daughter. Doing well. No issues> myeloma pane stable\par \par 11/7/18\par Patient is here for follow-up with her daughter.  Doing well.   No neuropathy.  We will continue monthly Zometa with one dose today.  We will also send the patient for a whole body PETscan.\par \par 12/5/18\par Patient is here for follow-up with her daughter.  The M-spike and lambda light chain is trending up but will continue current regimen.  The fact that there may be progression, we will continue with monthly Zometa for now.  They will do the PET/CT after the holidays.  Continue the Velcade. \par \par 1/30/19\par Patient is here for a follow-up visit with her son.  She is feeling well with no complaints.  Most recent CBC is stable.  Patient denies fever, chills, nausea, vomiting.  We will continue Zometa every 3 months.  The Lambda proteins are slowly increasing, now at 4.73 from 3.69 .  She is still taking the Acyclovir for prophylaxis.  \par She had a PET/CT to evaluate her bone disease that showed  Since December 14, 2017, overall decreased or resolved FDG uptake in  numerous lytic osseous lesions, consistent with good treatment response  (for example, 52% decrease in a right anterior second rib lesion now with  SUV of 4.6); highest SUV 5.8 in the right anterior 10th rib, 9% decrease.  2.  No new sites of abnormal FDG uptake.  \par \par 2/27/19\par Patient is here for a follow-up visit with her daughter.   Patient denies fever, chills, nausea, vomiting or any new rashes.  Her  passed away 2 weeks ago due to pulmonary fibrosis.  The daughter states that due to her dementia, she often forgets that he is not around and asks where he is.  \par \par 4/24/19\par She is here for follow up. Her involved light chain started to increase and was consistent on repeat testing. We stopped Velcade she not tolerate Daratumumab due to infusion time and was agitated and Started Pomaylst on March 29 2019. She is doing well.  We did not use a steroid since she gets agitated. No complaints.\par \par 5/22/19\par Patient is here for a follow-up visit with her daughter.  No new complaints.  Patient denies fever, chills, nausea, vomiting, or rashes.   She is tolerating the Pomalyst, her Lamda light chains are decreasing, they are 4.70 on 4/24.  \par \par 6/19/19\par Patient is here for a follow-up visit of MM with her son.  She is feeling well, only complaint is low BP (~90s/60s) with some weakness, they were instructed to decrease the Irbesartan to 75mg daily from 150mg.  Most recent myeloma panel is stable from last month.  She is on the off week of Pomalyst.   [de-identified] : This is a 72 year old female with history of sick sinus syndrome s/p PPM and as well as progressive dementia over 1.5 years was to see a specialist in [facility] who presented to [facility] due to a fall  as a result of  worsening gait on 11/23/17.   Her work up demonstrated , left tibial swelling, Acute displaced fracture of the distal humeral diaphysis.\par Multiple lytic bone lesions.  CT neck Multiple lytic lesions,  CT head At least mild to moderate hydrocephalus involving lateral, third and fourth ventricles. Innumerable lytic lesions.\par \par She was seen by Neurology who recommended outpatient LP and  Orhto casted the right humerus fracture and recommended Oncologic Ortho evaluation.  \par \par On admission she was noted to be Hypercalcemic and in ADRY. She received pamidronate 60 mg once with IVF and her hypercalcemia  and ADRY  resolved.\par \par Work up demonstrated lambda 352, kappa 8.5 with ration of 0.02,  SPEP M spike of 2.7 , Beta 2 7.2. Urine Total protein of  284 H  Albumin was 4.0\par \par She underwent a bone marrow biopsy on 12/1/17 that showed  flow cytometry performed at [facility]    Oncology report - Clonal (IgG-Lambda) plasma cells (28.2% of total), normocellular to mildly hypercellular (30-40%)    with a sheet of plasma cells, a large aggregate of plasma cells and    markedly increased interstitial plasma cells.  Megakaryocytes are    present.  Maturing myeloid and erythroid precursors are present.  Amyloid    deposition is not identified.  Special stains for Amyloid (Crystal Violet    and Congo Red) are negative.  Stainable iron is present (1-2+).  \par \par She also had a left lower extremity hematoma evacuation.\par \par She is here with her kids

## 2019-06-20 NOTE — PHYSICAL EXAM
[Restricted in physically strenuous activity but ambulatory and able to carry out work of a light or sedentary nature] : Status 1- Restricted in physically strenuous activity but ambulatory and able to carry out work of a light or sedentary nature, e.g., light house work, office work [Normal] : normal appearance, no rash, nodules, vesicles, ulcers, erythema [de-identified] : PPM [de-identified] :  right arm scar [de-identified] : trace edema in Lower extremities on Lasix if needed  [de-identified] : dementia, incontinence, ambulation difficulties but stable

## 2019-06-20 NOTE — ASSESSMENT
[FreeTextEntry1] : #IgG Lamda Multiple Myeloma with lytic bone lesions, anemia and hypercalcemia  on presentation, normal LDH,  Beta 2 7.2. Repeat levels were as follows , Albumin was 4.0. repeat Beta 2- 4.8\par  FISH- hyperploidy and gain of CCN1 which puts her at  standard risk. \par   PET/CT multiple FDG avid lytic lesion consistent with myeloma.\par - Was on  VRd..  with low dose  she gets agitated on steroids\par -She achieved a VGPR and  was having issues with Dexa . Its hard to give her pills so was on Velcade maintenance , no neuropathy\par - The M-spike and lambda light chains went up on Velcade so we stared  Pomalyst in 03/2019 with falling paraprotines\par \par Plan:\par - c/w Pomalyst, she has next refill already\par - c/w Zometa 3.5 mg  every 3 months, due in August 2019\par - continue calcium and Vitamin D\par - off acyclovir, continue on Aspirin \par \par Pathologic Right Humerus fracture s/p fixation - Dr. Glenn Salter\par \par Dementia - most likely Alzheimers \par - Neurology following Jessica Aguiar  fax \par - stable on SSRI and Seroqueloint\par \par Anemia due to MM  and or treatment \par - stable, mild\par \par Hypercalcemia from MM\par - resolved \par \par Edema, Norvasc was stopped\par - edema has improved, no longer taking Lasix.  Norvasc was stopped. Compression stocking and elevation encouraged \par - decrease Irbesartan to 75mg, BP low and patient feeling weak recently, new rx sent\par \par RTC in 1 month with CBC, CMP, serum immunoelectrophoresis if there are changes will call them

## 2019-06-21 ENCOUNTER — APPOINTMENT (OUTPATIENT)
Dept: INFUSION THERAPY | Facility: CLINIC | Age: 74
End: 2019-06-21

## 2019-06-21 RX ORDER — SODIUM CHLORIDE 9 MG/ML
500 INJECTION INTRAMUSCULAR; INTRAVENOUS; SUBCUTANEOUS
Refills: 0 | Status: COMPLETED | OUTPATIENT
Start: 2019-06-21 | End: 2019-06-21

## 2019-06-21 RX ADMIN — SODIUM CHLORIDE 500 MILLILITER(S): 9 INJECTION INTRAMUSCULAR; INTRAVENOUS; SUBCUTANEOUS at 17:57

## 2019-06-21 RX ADMIN — SODIUM CHLORIDE 500 MILLILITER(S): 9 INJECTION INTRAMUSCULAR; INTRAVENOUS; SUBCUTANEOUS at 15:11

## 2019-06-21 RX ADMIN — SODIUM CHLORIDE 500 MILLILITER(S): 9 INJECTION INTRAMUSCULAR; INTRAVENOUS; SUBCUTANEOUS at 15:08

## 2019-06-21 RX ADMIN — SODIUM CHLORIDE 500 MILLILITER(S): 9 INJECTION INTRAMUSCULAR; INTRAVENOUS; SUBCUTANEOUS at 16:08

## 2019-06-21 RX ADMIN — SODIUM CHLORIDE 500 MILLILITER(S): 9 INJECTION INTRAMUSCULAR; INTRAVENOUS; SUBCUTANEOUS at 15:12

## 2019-06-21 RX ADMIN — SODIUM CHLORIDE 500 MILLILITER(S): 9 INJECTION INTRAMUSCULAR; INTRAVENOUS; SUBCUTANEOUS at 15:10

## 2019-06-22 LAB
ALBUMIN MFR SERPL ELPH: 59.9 %
ALBUMIN SERPL-MCNC: 4.2 G/DL
ALBUMIN/GLOB SERPL: 1.5 RATIO
ALPHA1 GLOB MFR SERPL ELPH: 5.5 %
ALPHA1 GLOB SERPL ELPH-MCNC: 0.4 G/DL
ALPHA2 GLOB MFR SERPL ELPH: 8.6 %
ALPHA2 GLOB SERPL ELPH-MCNC: 0.6 G/DL
B-GLOBULIN MFR SERPL ELPH: 12.3 %
B-GLOBULIN SERPL ELPH-MCNC: 0.9 G/DL
DEPRECATED KAPPA LC FREE/LAMBDA SER: 0.45 RATIO
GAMMA GLOB FLD ELPH-MCNC: 1 G/DL
GAMMA GLOB MFR SERPL ELPH: 13.7 %
IGA SER QL IEP: 67 MG/DL
IGG SER QL IEP: 1146 MG/DL
IGM SER QL IEP: 20 MG/DL
INTERPRETATION SERPL IEP-IMP: NORMAL
KAPPA LC CSF-MCNC: 6.13 MG/DL
KAPPA LC SERPL-MCNC: 2.76 MG/DL
M PROTEIN MFR SERPL ELPH: 6 %
M PROTEIN SPEC IFE-MCNC: NORMAL
MONOCLON BAND OBS SERPL: 0.4 G/DL
PROT SERPL-MCNC: 7 G/DL
PROT SERPL-MCNC: 7 G/DL

## 2019-06-24 ENCOUNTER — APPOINTMENT (OUTPATIENT)
Dept: HEMATOLOGY ONCOLOGY | Facility: CLINIC | Age: 74
End: 2019-06-24

## 2019-06-25 LAB
ANION GAP SERPL CALC-SCNC: 13 MMOL/L
BUN SERPL-MCNC: 24 MG/DL
CALCIUM SERPL-MCNC: 8.9 MG/DL
CHLORIDE SERPL-SCNC: 103 MMOL/L
CO2 SERPL-SCNC: 23 MMOL/L
CREAT SERPL-MCNC: 1.6 MG/DL
GLUCOSE SERPL-MCNC: 85 MG/DL
POTASSIUM SERPL-SCNC: 4.7 MMOL/L
SODIUM SERPL-SCNC: 139 MMOL/L

## 2019-07-01 ENCOUNTER — APPOINTMENT (OUTPATIENT)
Dept: INFUSION THERAPY | Facility: CLINIC | Age: 74
End: 2019-07-01

## 2019-07-08 ENCOUNTER — RX RENEWAL (OUTPATIENT)
Age: 74
End: 2019-07-08

## 2019-07-08 ENCOUNTER — APPOINTMENT (OUTPATIENT)
Dept: INFUSION THERAPY | Facility: CLINIC | Age: 74
End: 2019-07-08

## 2019-07-15 ENCOUNTER — APPOINTMENT (OUTPATIENT)
Dept: INFUSION THERAPY | Facility: CLINIC | Age: 74
End: 2019-07-15

## 2019-07-15 RX ORDER — SODIUM CHLORIDE 9 MG/ML
500 INJECTION INTRAMUSCULAR; INTRAVENOUS; SUBCUTANEOUS
Refills: 0 | Status: DISCONTINUED | OUTPATIENT
Start: 2019-07-15 | End: 2019-12-17

## 2019-07-22 ENCOUNTER — APPOINTMENT (OUTPATIENT)
Dept: INFUSION THERAPY | Facility: CLINIC | Age: 74
End: 2019-07-22

## 2019-07-22 RX ORDER — SODIUM CHLORIDE 9 MG/ML
500 INJECTION INTRAMUSCULAR; INTRAVENOUS; SUBCUTANEOUS
Refills: 0 | Status: COMPLETED | OUTPATIENT
Start: 2019-07-22 | End: 2019-07-22

## 2019-07-22 RX ADMIN — SODIUM CHLORIDE 500 MILLILITER(S): 9 INJECTION INTRAMUSCULAR; INTRAVENOUS; SUBCUTANEOUS at 13:30

## 2019-07-29 ENCOUNTER — APPOINTMENT (OUTPATIENT)
Dept: INFUSION THERAPY | Facility: CLINIC | Age: 74
End: 2019-07-29

## 2019-07-30 ENCOUNTER — RX RENEWAL (OUTPATIENT)
Age: 74
End: 2019-07-30

## 2019-07-31 ENCOUNTER — APPOINTMENT (OUTPATIENT)
Dept: INFUSION THERAPY | Facility: CLINIC | Age: 74
End: 2019-07-31
Payer: MEDICARE

## 2019-07-31 ENCOUNTER — APPOINTMENT (OUTPATIENT)
Dept: HEMATOLOGY ONCOLOGY | Facility: CLINIC | Age: 74
End: 2019-07-31
Payer: MEDICARE

## 2019-07-31 ENCOUNTER — LABORATORY RESULT (OUTPATIENT)
Age: 74
End: 2019-07-31

## 2019-07-31 VITALS
SYSTOLIC BLOOD PRESSURE: 147 MMHG | RESPIRATION RATE: 14 BRPM | HEIGHT: 66 IN | WEIGHT: 152 LBS | DIASTOLIC BLOOD PRESSURE: 67 MMHG | BODY MASS INDEX: 24.43 KG/M2 | HEART RATE: 92 BPM

## 2019-07-31 LAB
HCT VFR BLD CALC: 30.1 %
HGB BLD-MCNC: 10.1 G/DL
MCHC RBC-ENTMCNC: 33.1 PG
MCHC RBC-ENTMCNC: 33.6 G/DL
MCV RBC AUTO: 98.7 FL
PLATELET # BLD AUTO: 220 K/UL
PMV BLD: 9.6 FL
RBC # BLD: 3.05 M/UL
RBC # FLD: 14.1 %
WBC # FLD AUTO: 5.56 K/UL

## 2019-07-31 PROCEDURE — 99214 OFFICE O/P EST MOD 30 MIN: CPT

## 2019-08-01 LAB
ALBUMIN SERPL ELPH-MCNC: 4 G/DL
ALP BLD-CCNC: 25 U/L
ALT SERPL-CCNC: 13 U/L
ANION GAP SERPL CALC-SCNC: 16 MMOL/L
AST SERPL-CCNC: 44 U/L
BILIRUB SERPL-MCNC: 0.4 MG/DL
BUN SERPL-MCNC: 32 MG/DL
CALCIUM SERPL-MCNC: 9.3 MG/DL
CHLORIDE SERPL-SCNC: 103 MMOL/L
CO2 SERPL-SCNC: 17 MMOL/L
CREAT SERPL-MCNC: 1.8 MG/DL
GLUCOSE SERPL-MCNC: 80 MG/DL
POTASSIUM SERPL-SCNC: 5.9 MMOL/L
PROT SERPL-MCNC: 7.2 G/DL
SODIUM SERPL-SCNC: 136 MMOL/L

## 2019-08-01 NOTE — CONSULT LETTER
[Dear  ___] : Dear  [unfilled], [Referral Letter:] : I am referring [unfilled] to you for further evaluation.  My most recent evaluation follows. [Referral Closing:] : Thank you very much for seeing this patient.  If you have any questions, please do not hesitate to contact me. [Please see my note below.] : Please see my note below. [Sincerely,] : Sincerely, [FreeTextEntry3] : Hemanth

## 2019-08-01 NOTE — PHYSICAL EXAM
[Restricted in physically strenuous activity but ambulatory and able to carry out work of a light or sedentary nature] : Status 1- Restricted in physically strenuous activity but ambulatory and able to carry out work of a light or sedentary nature, e.g., light house work, office work [Normal] : normal appearance, no rash, nodules, vesicles, ulcers, erythema [de-identified] : PPM [de-identified] : trace edema in Lower extremities on Lasix if needed  [de-identified] :  right arm scar [de-identified] : dementia, incontinence, ambulation difficulties but stable

## 2019-08-01 NOTE — REVIEW OF SYSTEMS
[Fatigue] : no fatigue [Recent Change In Weight] : ~T no recent weight change [Dry Eyes] : dryness of the eyes [Lower Ext Edema] : lower extremity edema [Incontinence] : incontinence [Joint Pain] : no joint pain [Muscle Pain] : no muscle pain [Muscle Weakness] : muscle weakness [Difficulty Walking] : difficulty walking [Confused] : confusion [Negative] : Heme/Lymph [de-identified] : Dementia

## 2019-08-01 NOTE — HISTORY OF PRESENT ILLNESS
[Disease:__________________________] : Disease: [unfilled] [de-identified] : This is a 72 year old female with history of sick sinus syndrome s/p PPM and as well as progressive dementia over 1.5 years was to see a specialist in [facility] who presented to [facility] due to a fall  as a result of  worsening gait on 11/23/17.   Her work up demonstrated , left tibial swelling, Acute displaced fracture of the distal humeral diaphysis.\par Multiple lytic bone lesions.  CT neck Multiple lytic lesions,  CT head At least mild to moderate hydrocephalus involving lateral, third and fourth ventricles. Innumerable lytic lesions.\par \par She was seen by Neurology who recommended outpatient LP and  Orhto casted the right humerus fracture and recommended Oncologic Ortho evaluation.  \par \par On admission she was noted to be Hypercalcemic and in ADRY. She received pamidronate 60 mg once with IVF and her hypercalcemia  and ADRY  resolved.\par \par Work up demonstrated lambda 352, kappa 8.5 with ration of 0.02,  SPEP M spike of 2.7 , Beta 2 7.2. Urine Total protein of  284 H  Albumin was 4.0\par \par She underwent a bone marrow biopsy on 12/1/17 that showed  flow cytometry performed at [facility]    Oncology report - Clonal (IgG-Lambda) plasma cells (28.2% of total), normocellular to mildly hypercellular (30-40%)    with a sheet of plasma cells, a large aggregate of plasma cells and    markedly increased interstitial plasma cells.  Megakaryocytes are    present.  Maturing myeloid and erythroid precursors are present.  Amyloid    deposition is not identified.  Special stains for Amyloid (Crystal Violet    and Congo Red) are negative.  Stainable iron is present (1-2+).  \par \par She also had a left lower extremity hematoma evacuation.\par \par She is here with her kids [Therapy: ___] : Therapy: [unfilled] [Cycle: ___] : Cycle: [unfilled] [Day: ___] : Day: [unfilled] [FreeTextEntry1] : Started Pomalyst in 03/2019.\par VD was started  initially while we waited for R. On cycle 2 with R we stopped RD on  6/2018 and was on  maintenance Velcade progressed did not tolerate Daratumumab due to infusion time and was agitated and Started Pomalyst on March 29 2019  [de-identified] : 01/09/2018\par  Patient is here today with her family members for a follow up visit. So far got 4 doses of velcade and is currently on Day 8 of revlimid.  As per family, they noticed that her dementia is more worse  after starting VRD. Also reports Insomnia. She was evaluated by neurologist and so far, the  reason for her dementia is not established. Decision on Lumbar puncture is not yet made as neurologist did not think her symptoms were related to Normal pressure hydrocephalus.\par  ALso complaints of insomnia since being on VRD, likely from steriods. Her right arm has been healing well since the surgery and she will begin physical therapy soon. \par PET/CT results reviewed. Noted to have multiple FDG avid lytic lesions including right anterior second rib with SUV of 9.5.\par \par 1/25/18 \par She is here for follow up for her myeloma. Her  lambda started to fall and mspike went down as well she was only on Revlimid for 8 days,  Her GFR is also improving now 71.\par We decreased her dexamethasone to 16 mg po weekly due to psychosis. She now has mild ankle swelling in bilateral extremities. She now has urinary incontinence. Has bilateral hip pains in morning that improve with ambulation.  She started this cycle 2 on Teusday\par \par 2/15/18\par She is here for follow up. Her Neurologist in [facility] found a CT scan from [facility] back that also had hydrocephalus thus Alziemers is the cause of her dementia. They were seen in [facility] and VRD was agreed with although their dosing recommendations was to move it to q28 days. We decided to keep things as is since they are use to this schedule. She small  blisters on eye lids small under 5 mm  one in each lower eye lid. They are not painful.  She is getting out of chair with ease. No other complaints> She also has repeat CT scan of head as per her neurologist results are bellow.\par \par 3/9/18\par She is here for follow up. She is dong well. She is here with her daughter. Trazodone is helping. No pains. He rash in the eyes has resolved they did not see optho.\par \par \par 3/28/18\par She is here for follow up with her son. She is doing well.  Son states she is physically better.  No complaints. Swelling in legs improved.\par \par 4/18/48\par She is here for follow up. She once again has Sty in her eyelids. It does not botehr her. she is doing well otherwise she has no pain.\par \par 5/10/18\par She is here for follow up with her daughter. HEr eyes are better using a wash. DId not see optho, Now on SSRI and Quatione?. Her light chain and Mspike keeps coming down\par \par 5/30/18 \par \par She is here for follow up. She is doing well. Her LIght chain is not normal. still had positive ROSALIND.  She started the Revlimid 3 days ago.\par \par 6/22/18\par She is here for follow up with daughter. She is having a hard time with mood swings with steroids so dexa was  stopped  it. \par \par 7/18/18\par She is here with her daughter. She is doing well. Still has some LE swelling.  No other issues. \par \par 9/12/18\par She is here for follow up. Doing well. HEre with her daughter. No new complaints. Myeloma panel stable \par \par 10/10/18\par Here with her daughter. Doing well. No issues> myeloma pane stable\par \par 11/7/18\par Patient is here for follow-up with her daughter.  Doing well.   No neuropathy.  We will continue monthly Zometa with one dose today.  We will also send the patient for a whole body PETscan.\par \par 12/5/18\par Patient is here for follow-up with her daughter.  The M-spike and lambda light chain is trending up but will continue current regimen.  The fact that there may be progression, we will continue with monthly Zometa for now.  They will do the PET/CT after the holidays.  Continue the Velcade. \par \par 1/30/19\par Patient is here for a follow-up visit with her son.  She is feeling well with no complaints.  Most recent CBC is stable.  Patient denies fever, chills, nausea, vomiting.  We will continue Zometa every 3 months.  The Lambda proteins are slowly increasing, now at 4.73 from 3.69 .  She is still taking the Acyclovir for prophylaxis.  \par She had a PET/CT to evaluate her bone disease that showed  Since December 14, 2017, overall decreased or resolved FDG uptake in  numerous lytic osseous lesions, consistent with good treatment response  (for example, 52% decrease in a right anterior second rib lesion now with  SUV of 4.6); highest SUV 5.8 in the right anterior 10th rib, 9% decrease.  2.  No new sites of abnormal FDG uptake.  \par \par 2/27/19\par Patient is here for a follow-up visit with her daughter.   Patient denies fever, chills, nausea, vomiting or any new rashes.  Her  passed away 2 weeks ago due to pulmonary fibrosis.  The daughter states that due to her dementia, she often forgets that he is not around and asks where he is.  \par \par 4/24/19\par She is here for follow up. Her involved light chain started to increase and was consistent on repeat testing. We stopped Velcade she not tolerate Daratumumab due to infusion time and was agitated and Started Pomaylst on March 29 2019. She is doing well.  We did not use a steroid since she gets agitated. No complaints.\par \par 5/22/19\par Patient is here for a follow-up visit with her daughter.  No new complaints.  Patient denies fever, chills, nausea, vomiting, or rashes.   She is tolerating the Pomalyst, her Lamda light chains are decreasing, they are 4.70 on 4/24.  \par \par 6/19/19\par Patient is here for a follow-up visit of MM with her son.  She is feeling well, only complaint is low BP (~90s/60s) with some weakness, they were instructed to decrease the Irbesartan to 75mg daily from 150mg.  Most recent myeloma panel is stable from last month.  She is on the off week of Pomalyst.  \par \par 7/31/19\par She is here with her son. she had ADRY due to dehydration. I started her on weekly hydration and it did improved. She is already on next cycle of Pomlyst. MM panel without progression.

## 2019-08-01 NOTE — ASSESSMENT
[FreeTextEntry1] : #IgG Lamda Multiple Myeloma with lytic bone lesions, anemia and hypercalcemia  on presentation, normal LDH,  Beta 2 7.2. Repeat levels were as follows , Albumin was 4.0. repeat Beta 2- 4.8\par  FISH- hyperploidy and gain of CCN1 which puts her at  standard risk. \par   PET/CT multiple FDG avid lytic lesion consistent with myeloma.\par - Was on  VRd..  with low dose  she gets agitated on steroids\par -She achieved a VGPR and  was having issues with Dexa . Its hard to give her pills so was on Velcade maintenance , no neuropathy\par - The M-spike and lambda light chains went up on Velcade so we stared  Pomalyst in 03/2019 with falling paraprotines that are now stable\par \par Plan:\par - c/w Pomalyst,\par - c/w Zometa 3.5 mg  every 3 months, due in August 2019 may need lower dose\par - continue calcium and Vitamin D\par - off acyclovir, continue on Aspirin \par \par Pathologic Right Humerus fracture s/p fixation - Dr. Glenn Salter\par \par Dementia - most likely Alzheimers \par - Neurology following Jessica Aguiar  fax \par - stable on SSRI and Seroqueloint\par \par Anemia due to MM  and or treatment \par - stable, mild\par \par Hypercalcemia from MM\par - resolved \par \par Edema, Norvasc was stopped\par - edema has improved, no longer taking Lasix.  Norvasc was stopped. Compression stocking and elevation encouraged \par - decreased Irbesartan to 75mg, BP low and patient feeling weak recently, new rx sent\par \par ADRY due to dehydration, she is not drinking unless family tells her\par -on weekly IV fluids now\par \par RTC in 1 month with CBC, CMP, serum immunoelectrophoresis if there are changes will call them

## 2019-08-05 LAB
ALBUMIN MFR SERPL ELPH: NORMAL %
ALBUMIN SERPL-MCNC: NORMAL G/DL
ALPHA1 GLOB MFR SERPL ELPH: NORMAL %
ALPHA1 GLOB SERPL ELPH-MCNC: NORMAL G/DL
ALPHA2 GLOB MFR SERPL ELPH: NORMAL %
ALPHA2 GLOB SERPL ELPH-MCNC: NORMAL G/DL
B-GLOBULIN MFR SERPL ELPH: NORMAL %
B-GLOBULIN SERPL ELPH-MCNC: NORMAL G/DL
DEPRECATED KAPPA LC FREE/LAMBDA SER: 0.41 RATIO
GAMMA GLOB FLD ELPH-MCNC: NORMAL G/DL
GAMMA GLOB MFR SERPL ELPH: NORMAL %
IGA SER QL IEP: 75 MG/DL
IGG SER QL IEP: 1004 MG/DL
IGM SER QL IEP: 24 MG/DL
INTERPRETATION SERPL IEP-IMP: NORMAL
KAPPA LC CSF-MCNC: 6.86 MG/DL
KAPPA LC SERPL-MCNC: 2.84 MG/DL
M PROTEIN SPEC IFE-MCNC: NORMAL
PROT SERPL-MCNC: 6.5 G/DL
PROT SERPL-MCNC: 6.5 G/DL

## 2019-08-07 ENCOUNTER — APPOINTMENT (OUTPATIENT)
Dept: INFUSION THERAPY | Facility: CLINIC | Age: 74
End: 2019-08-07

## 2019-08-08 LAB
ALBUMIN SERPL ELPH-MCNC: 3.8 G/DL
ALP BLD-CCNC: 28 U/L
ALT SERPL-CCNC: 12 U/L
ANION GAP SERPL CALC-SCNC: 11 MMOL/L
AST SERPL-CCNC: 41 U/L
BILIRUB SERPL-MCNC: 0.3 MG/DL
BUN SERPL-MCNC: 26 MG/DL
CALCIUM SERPL-MCNC: 8.6 MG/DL
CHLORIDE SERPL-SCNC: 107 MMOL/L
CO2 SERPL-SCNC: 20 MMOL/L
CREAT SERPL-MCNC: 1.2 MG/DL
GLUCOSE SERPL-MCNC: 86 MG/DL
POTASSIUM SERPL-SCNC: 6.8 MMOL/L
PROT SERPL-MCNC: 6.7 G/DL
SODIUM SERPL-SCNC: 138 MMOL/L

## 2019-08-12 ENCOUNTER — APPOINTMENT (OUTPATIENT)
Dept: HEMATOLOGY ONCOLOGY | Facility: CLINIC | Age: 74
End: 2019-08-12

## 2019-08-14 ENCOUNTER — APPOINTMENT (OUTPATIENT)
Dept: INFUSION THERAPY | Facility: CLINIC | Age: 74
End: 2019-08-14

## 2019-08-14 LAB
ALBUMIN SERPL ELPH-MCNC: 4.1 G/DL
ALP BLD-CCNC: 36 U/L
ALT SERPL-CCNC: 10 U/L
ANION GAP SERPL CALC-SCNC: 11 MMOL/L
AST SERPL-CCNC: 13 U/L
BILIRUB SERPL-MCNC: 0.4 MG/DL
BUN SERPL-MCNC: 31 MG/DL
CALCIUM SERPL-MCNC: 9 MG/DL
CHLORIDE SERPL-SCNC: 109 MMOL/L
CO2 SERPL-SCNC: 23 MMOL/L
CREAT SERPL-MCNC: 1.6 MG/DL
GLUCOSE SERPL-MCNC: 91 MG/DL
POTASSIUM SERPL-SCNC: 4.5 MMOL/L
PROT SERPL-MCNC: 6.4 G/DL
SODIUM SERPL-SCNC: 143 MMOL/L

## 2019-08-14 RX ORDER — ZOLEDRONIC ACID 5 MG/100ML
3.3 INJECTION, SOLUTION INTRAVENOUS ONCE
Refills: 0 | Status: COMPLETED | OUTPATIENT
Start: 2019-08-14 | End: 2019-08-14

## 2019-08-14 RX ORDER — SODIUM CHLORIDE 9 MG/ML
500 INJECTION INTRAMUSCULAR; INTRAVENOUS; SUBCUTANEOUS
Refills: 0 | Status: DISCONTINUED | OUTPATIENT
Start: 2019-08-14 | End: 2019-12-17

## 2019-08-14 RX ADMIN — SODIUM CHLORIDE 500 MILLILITER(S): 9 INJECTION INTRAMUSCULAR; INTRAVENOUS; SUBCUTANEOUS at 12:38

## 2019-08-14 RX ADMIN — ZOLEDRONIC ACID 200 MILLIGRAM(S): 5 INJECTION, SOLUTION INTRAVENOUS at 12:39

## 2019-08-14 RX ADMIN — SODIUM CHLORIDE 500 MILLILITER(S): 9 INJECTION INTRAMUSCULAR; INTRAVENOUS; SUBCUTANEOUS at 13:38

## 2019-08-14 RX ADMIN — ZOLEDRONIC ACID 3.3 MILLIGRAM(S): 5 INJECTION, SOLUTION INTRAVENOUS at 13:09

## 2019-08-21 ENCOUNTER — APPOINTMENT (OUTPATIENT)
Dept: INFUSION THERAPY | Facility: CLINIC | Age: 74
End: 2019-08-21

## 2019-08-21 RX ORDER — SODIUM CHLORIDE 9 MG/ML
500 INJECTION INTRAMUSCULAR; INTRAVENOUS; SUBCUTANEOUS
Refills: 0 | Status: COMPLETED | OUTPATIENT
Start: 2019-08-21 | End: 2019-08-21

## 2019-08-21 RX ADMIN — SODIUM CHLORIDE 500 MILLILITER(S): 9 INJECTION INTRAMUSCULAR; INTRAVENOUS; SUBCUTANEOUS at 14:14

## 2019-08-28 ENCOUNTER — APPOINTMENT (OUTPATIENT)
Dept: HEMATOLOGY ONCOLOGY | Facility: CLINIC | Age: 74
End: 2019-08-28
Payer: MEDICARE

## 2019-08-28 ENCOUNTER — APPOINTMENT (OUTPATIENT)
Dept: INFUSION THERAPY | Facility: CLINIC | Age: 74
End: 2019-08-28
Payer: MEDICARE

## 2019-08-28 PROCEDURE — 99213 OFFICE O/P EST LOW 20 MIN: CPT

## 2019-08-28 RX ORDER — TRAZODONE HYDROCHLORIDE 100 MG/1
100 TABLET ORAL
Qty: 90 | Refills: 3 | Status: ACTIVE | COMMUNITY
Start: 2018-01-09 | End: 1900-01-01

## 2019-08-28 RX ORDER — SODIUM CHLORIDE 9 MG/ML
500 INJECTION INTRAMUSCULAR; INTRAVENOUS; SUBCUTANEOUS
Refills: 0 | Status: COMPLETED | OUTPATIENT
Start: 2019-08-28 | End: 2019-08-28

## 2019-08-28 RX ADMIN — SODIUM CHLORIDE 500 MILLILITER(S): 9 INJECTION INTRAMUSCULAR; INTRAVENOUS; SUBCUTANEOUS at 13:55

## 2019-09-03 NOTE — REVIEW OF SYSTEMS
[Fatigue] : no fatigue [Recent Change In Weight] : ~T no recent weight change [Dry Eyes] : dryness of the eyes [Lower Ext Edema] : lower extremity edema [Incontinence] : incontinence [Joint Pain] : no joint pain [Muscle Pain] : no muscle pain [Muscle Weakness] : muscle weakness [Confused] : confusion [Difficulty Walking] : difficulty walking [Negative] : Heme/Lymph [de-identified] : Dementia

## 2019-09-03 NOTE — ASSESSMENT
[FreeTextEntry1] : #IgG Lamda Multiple Myeloma with lytic bone lesions, anemia and hypercalcemia  on presentation, normal LDH,  Beta 2 7.2. Repeat levels were as follows , Albumin was 4.0. repeat Beta 2- 4.8\par  FISH- hyperploidy and gain of CCN1 which puts her at  standard risk. \par   PET/CT multiple FDG avid lytic lesion consistent with myeloma.\par - Was on  VRd..  with low dose  she gets agitated on steroids\par -She achieved a VGPR and  was having issues with Dexa . Its hard to give her pills so was on Velcade maintenance , no neuropathy\par - The M-spike and lambda light chains went up on Velcade so we stared  Pomalyst in 03/2019 with falling paraprotines that are now stable\par \par Plan:\par - c/w Pomalyst only \par - c/w Zometa 3.5 mg  every 3 months, due in December\par - continue calcium and Vitamin D\par - off acyclovir, continue on Aspirin \par \par Pathologic Right Humerus fracture s/p fixation - Dr. Glenn Salter\par \par Dementia - most likely Alzheimers \par - Neurology following Jessica Aguiar  fax \par - stable on SSRI and Seroquel \par \par Anemia due to MM  and or treatment \par - stable, mild\par \par Hypercalcemia from MM\par - resolved \par \par Edema, Norvasc was stopped\par - edema has improved, no longer taking Lasix.  Norvasc was stopped. Compression stocking and elevation encouraged \par - decreased Irbesartan to 75mg, BP low and patient feeling weak recently, new rx sent\par \par ADRY due to dehydration, she is not drinking unless family tells her\par -on weekly IV fluids now\par \par RTC in 1 month with CBC, CMP, serum immunoelectrophoresis if there are changes will call them

## 2019-09-03 NOTE — HISTORY OF PRESENT ILLNESS
[Disease:__________________________] : Disease: [unfilled] [de-identified] : This is a 72 year old female with history of sick sinus syndrome s/p PPM and as well as progressive dementia over 1.5 years was to see a specialist in [facility] who presented to [facility] due to a fall  as a result of  worsening gait on 11/23/17.   Her work up demonstrated , left tibial swelling, Acute displaced fracture of the distal humeral diaphysis.\par Multiple lytic bone lesions.  CT neck Multiple lytic lesions,  CT head At least mild to moderate hydrocephalus involving lateral, third and fourth ventricles. Innumerable lytic lesions.\par \par She was seen by Neurology who recommended outpatient LP and  Orhto casted the right humerus fracture and recommended Oncologic Ortho evaluation.  \par \par On admission she was noted to be Hypercalcemic and in ADRY. She received pamidronate 60 mg once with IVF and her hypercalcemia  and ADRY  resolved.\par \par Work up demonstrated lambda 352, kappa 8.5 with ration of 0.02,  SPEP M spike of 2.7 , Beta 2 7.2. Urine Total protein of  284 H  Albumin was 4.0\par \par She underwent a bone marrow biopsy on 12/1/17 that showed  flow cytometry performed at [facility]    Oncology report - Clonal (IgG-Lambda) plasma cells (28.2% of total), normocellular to mildly hypercellular (30-40%)    with a sheet of plasma cells, a large aggregate of plasma cells and    markedly increased interstitial plasma cells.  Megakaryocytes are    present.  Maturing myeloid and erythroid precursors are present.  Amyloid    deposition is not identified.  Special stains for Amyloid (Crystal Violet    and Congo Red) are negative.  Stainable iron is present (1-2+).  \par \par She also had a left lower extremity hematoma evacuation.\par \par She is here with her kids [Therapy: ___] : Therapy: [unfilled] [Cycle: ___] : Cycle: [unfilled] [Day: ___] : Day: [unfilled] [FreeTextEntry1] : Started Pomalyst in 03/2019.\par VD was started  initially while we waited for R. On cycle 2 with R we stopped RD on  6/2018 and was on  maintenance Velcade progressed did not tolerate Daratumumab due to infusion time and was agitated and Started Pomalyst on March 29 2019  [de-identified] : 01/09/2018\par  Patient is here today with her family members for a follow up visit. So far got 4 doses of velcade and is currently on Day 8 of revlimid.  As per family, they noticed that her dementia is more worse  after starting VRD. Also reports Insomnia. She was evaluated by neurologist and so far, the  reason for her dementia is not established. Decision on Lumbar puncture is not yet made as neurologist did not think her symptoms were related to Normal pressure hydrocephalus.\par  ALso complaints of insomnia since being on VRD, likely from steriods. Her right arm has been healing well since the surgery and she will begin physical therapy soon. \par PET/CT results reviewed. Noted to have multiple FDG avid lytic lesions including right anterior second rib with SUV of 9.5.\par \par 1/25/18 \par She is here for follow up for her myeloma. Her  lambda started to fall and mspike went down as well she was only on Revlimid for 8 days,  Her GFR is also improving now 71.\par We decreased her dexamethasone to 16 mg po weekly due to psychosis. She now has mild ankle swelling in bilateral extremities. She now has urinary incontinence. Has bilateral hip pains in morning that improve with ambulation.  She started this cycle 2 on Teusday\par \par 2/15/18\par She is here for follow up. Her Neurologist in [facility] found a CT scan from [facility] back that also had hydrocephalus thus Alziemers is the cause of her dementia. They were seen in [facility] and VRD was agreed with although their dosing recommendations was to move it to q28 days. We decided to keep things as is since they are use to this schedule. She small  blisters on eye lids small under 5 mm  one in each lower eye lid. They are not painful.  She is getting out of chair with ease. No other complaints> She also has repeat CT scan of head as per her neurologist results are bellow.\par \par 3/9/18\par She is here for follow up. She is dong well. She is here with her daughter. Trazodone is helping. No pains. He rash in the eyes has resolved they did not see optho.\par \par \par 3/28/18\par She is here for follow up with her son. She is doing well.  Son states she is physically better.  No complaints. Swelling in legs improved.\par \par 4/18/48\par She is here for follow up. She once again has Sty in her eyelids. It does not botehr her. she is doing well otherwise she has no pain.\par \par 5/10/18\par She is here for follow up with her daughter. HEr eyes are better using a wash. DId not see optho, Now on SSRI and Quatione?. Her light chain and Mspike keeps coming down\par \par 5/30/18 \par \par She is here for follow up. She is doing well. Her LIght chain is not normal. still had positive ROSALIND.  She started the Revlimid 3 days ago.\par \par 6/22/18\par She is here for follow up with daughter. She is having a hard time with mood swings with steroids so dexa was  stopped  it. \par \par 7/18/18\par She is here with her daughter. She is doing well. Still has some LE swelling.  No other issues. \par \par 9/12/18\par She is here for follow up. Doing well. HEre with her daughter. No new complaints. Myeloma panel stable \par \par 10/10/18\par Here with her daughter. Doing well. No issues> myeloma pane stable\par \par 11/7/18\par Patient is here for follow-up with her daughter.  Doing well.   No neuropathy.  We will continue monthly Zometa with one dose today.  We will also send the patient for a whole body PETscan.\par \par 12/5/18\par Patient is here for follow-up with her daughter.  The M-spike and lambda light chain is trending up but will continue current regimen.  The fact that there may be progression, we will continue with monthly Zometa for now.  They will do the PET/CT after the holidays.  Continue the Velcade. \par \par 1/30/19\par Patient is here for a follow-up visit with her son.  She is feeling well with no complaints.  Most recent CBC is stable.  Patient denies fever, chills, nausea, vomiting.  We will continue Zometa every 3 months.  The Lambda proteins are slowly increasing, now at 4.73 from 3.69 .  She is still taking the Acyclovir for prophylaxis.  \par She had a PET/CT to evaluate her bone disease that showed  Since December 14, 2017, overall decreased or resolved FDG uptake in  numerous lytic osseous lesions, consistent with good treatment response  (for example, 52% decrease in a right anterior second rib lesion now with  SUV of 4.6); highest SUV 5.8 in the right anterior 10th rib, 9% decrease.  2.  No new sites of abnormal FDG uptake.  \par \par 2/27/19\par Patient is here for a follow-up visit with her daughter.   Patient denies fever, chills, nausea, vomiting or any new rashes.  Her  passed away 2 weeks ago due to pulmonary fibrosis.  The daughter states that due to her dementia, she often forgets that he is not around and asks where he is.  \par \par 4/24/19\par She is here for follow up. Her involved light chain started to increase and was consistent on repeat testing. We stopped Velcade she not tolerate Daratumumab due to infusion time and was agitated and Started Pomaylst on March 29 2019. She is doing well.  We did not use a steroid since she gets agitated. No complaints.\par \par 5/22/19\par Patient is here for a follow-up visit with her daughter.  No new complaints.  Patient denies fever, chills, nausea, vomiting, or rashes.   She is tolerating the Pomalyst, her Lamda light chains are decreasing, they are 4.70 on 4/24.  \par \par 6/19/19\par Patient is here for a follow-up visit of MM with her son.  She is feeling well, only complaint is low BP (~90s/60s) with some weakness, they were instructed to decrease the Irbesartan to 75mg daily from 150mg.  Most recent myeloma panel is stable from last month.  She is on the off week of Pomalyst.  \par \par 7/31/19\par She is here with her son. she had ADRY due to dehydration. I started her on weekly hydration and it did improved. She is already on next cycle of Pomlyst. MM panel without progression.\par \par 8/28/19\par She is here with daughter. We have to administer  ml weekly since she does not drink at all anymore. Otherwise labs have not shown progression. Dementia is stable for now.

## 2019-09-03 NOTE — PHYSICAL EXAM
[Restricted in physically strenuous activity but ambulatory and able to carry out work of a light or sedentary nature] : Status 1- Restricted in physically strenuous activity but ambulatory and able to carry out work of a light or sedentary nature, e.g., light house work, office work [Normal] : normal appearance, no rash, nodules, vesicles, ulcers, erythema [de-identified] : PPM [de-identified] : trace edema in Lower extremities on Lasix if needed  [de-identified] :  right arm scar [de-identified] : dementia, incontinence, ambulation difficulties but stable

## 2019-09-04 ENCOUNTER — LABORATORY RESULT (OUTPATIENT)
Age: 74
End: 2019-09-04

## 2019-09-04 ENCOUNTER — APPOINTMENT (OUTPATIENT)
Dept: INFUSION THERAPY | Facility: CLINIC | Age: 74
End: 2019-09-04

## 2019-09-04 LAB
ALBUMIN SERPL ELPH-MCNC: 4 G/DL
ALP BLD-CCNC: 35 U/L
ALT SERPL-CCNC: 26 U/L
ANION GAP SERPL CALC-SCNC: 16 MMOL/L
AST SERPL-CCNC: 57 U/L
BILIRUB SERPL-MCNC: 0.4 MG/DL
BUN SERPL-MCNC: 31 MG/DL
CALCIUM SERPL-MCNC: 8.9 MG/DL
CHLORIDE SERPL-SCNC: 105 MMOL/L
CO2 SERPL-SCNC: 17 MMOL/L
CREAT SERPL-MCNC: 1.6 MG/DL
GLUCOSE SERPL-MCNC: 89 MG/DL
HCT VFR BLD CALC: 27.7 %
HGB BLD-MCNC: 9.3 G/DL
MAGNESIUM SERPL-MCNC: 2.1 MG/DL
MCHC RBC-ENTMCNC: 33.6 G/DL
MCHC RBC-ENTMCNC: 33.6 PG
MCV RBC AUTO: 100 FL
PLATELET # BLD AUTO: 171 K/UL
PMV BLD: 9.5 FL
POTASSIUM SERPL-SCNC: 4 MMOL/L
PROT SERPL-MCNC: 6.2 G/DL
RBC # BLD: 2.77 M/UL
RBC # FLD: 14.5 %
SODIUM SERPL-SCNC: 138 MMOL/L
WBC # FLD AUTO: 5.29 K/UL

## 2019-09-04 RX ORDER — SODIUM CHLORIDE 9 MG/ML
500 INJECTION INTRAMUSCULAR; INTRAVENOUS; SUBCUTANEOUS
Refills: 0 | Status: COMPLETED | OUTPATIENT
Start: 2019-09-04 | End: 2019-09-04

## 2019-09-04 RX ADMIN — SODIUM CHLORIDE 500 MILLILITER(S): 9 INJECTION INTRAMUSCULAR; INTRAVENOUS; SUBCUTANEOUS at 11:25

## 2019-09-06 ENCOUNTER — RX RENEWAL (OUTPATIENT)
Age: 74
End: 2019-09-06

## 2019-09-11 ENCOUNTER — OUTPATIENT (OUTPATIENT)
Dept: OUTPATIENT SERVICES | Facility: HOSPITAL | Age: 74
LOS: 1 days | Discharge: HOME | End: 2019-09-11

## 2019-09-11 ENCOUNTER — APPOINTMENT (OUTPATIENT)
Dept: INFUSION THERAPY | Facility: CLINIC | Age: 74
End: 2019-09-11

## 2019-09-11 DIAGNOSIS — Z51.11 ENCOUNTER FOR ANTINEOPLASTIC CHEMOTHERAPY: ICD-10-CM

## 2019-09-11 DIAGNOSIS — C90.00 MULTIPLE MYELOMA NOT HAVING ACHIEVED REMISSION: ICD-10-CM

## 2019-09-11 LAB
ALBUMIN SERPL ELPH-MCNC: 3.9 G/DL
ALP BLD-CCNC: 26 U/L
ALT SERPL-CCNC: 21 U/L
ANION GAP SERPL CALC-SCNC: 13 MMOL/L
AST SERPL-CCNC: 39 U/L
BILIRUB SERPL-MCNC: 0.3 MG/DL
BUN SERPL-MCNC: 22 MG/DL
CALCIUM SERPL-MCNC: 8.8 MG/DL
CHLORIDE SERPL-SCNC: 102 MMOL/L
CO2 SERPL-SCNC: 21 MMOL/L
CREAT SERPL-MCNC: 1.3 MG/DL
GLUCOSE SERPL-MCNC: 109 MG/DL
MAGNESIUM SERPL-MCNC: 2.1 MG/DL
POTASSIUM SERPL-SCNC: 4.9 MMOL/L
PROT SERPL-MCNC: 6.4 G/DL
SODIUM SERPL-SCNC: 136 MMOL/L

## 2019-09-11 RX ORDER — SODIUM CHLORIDE 9 MG/ML
500 INJECTION INTRAMUSCULAR; INTRAVENOUS; SUBCUTANEOUS
Refills: 0 | Status: DISCONTINUED | OUTPATIENT
Start: 2019-09-11 | End: 2019-12-17

## 2019-09-11 RX ADMIN — SODIUM CHLORIDE 500 MILLILITER(S): 9 INJECTION INTRAMUSCULAR; INTRAVENOUS; SUBCUTANEOUS at 16:24

## 2019-09-11 RX ADMIN — SODIUM CHLORIDE 500 MILLILITER(S): 9 INJECTION INTRAMUSCULAR; INTRAVENOUS; SUBCUTANEOUS at 16:00

## 2019-09-13 ENCOUNTER — APPOINTMENT (OUTPATIENT)
Dept: CARDIOLOGY | Facility: CLINIC | Age: 74
End: 2019-09-13
Payer: MEDICARE

## 2019-09-13 VITALS — BODY MASS INDEX: 25.02 KG/M2 | WEIGHT: 155 LBS

## 2019-09-13 VITALS — DIASTOLIC BLOOD PRESSURE: 58 MMHG | SYSTOLIC BLOOD PRESSURE: 108 MMHG

## 2019-09-13 DIAGNOSIS — Z45.018 ENCOUNTER FOR ADJUSTMENT AND MANAGEMENT OF OTHER PART OF CARDIAC PACEMAKER: ICD-10-CM

## 2019-09-13 PROCEDURE — 99213 OFFICE O/P EST LOW 20 MIN: CPT

## 2019-09-13 PROCEDURE — 93280 PM DEVICE PROGR EVAL DUAL: CPT

## 2019-09-13 RX ORDER — FLUTICASONE PROPIONATE 50 UG/1
50 SPRAY, METERED NASAL
Qty: 16 | Refills: 0 | Status: DISCONTINUED | COMMUNITY
Start: 2017-08-23 | End: 2019-09-13

## 2019-09-13 RX ORDER — ONDANSETRON 8 MG/1
8 TABLET ORAL EVERY 8 HOURS
Qty: 30 | Refills: 3 | Status: DISCONTINUED | COMMUNITY
Start: 2017-12-26 | End: 2019-09-13

## 2019-09-13 RX ORDER — QUETIAPINE FUMARATE 50 MG/1
50 TABLET ORAL
Refills: 0 | Status: ACTIVE | COMMUNITY

## 2019-09-13 RX ORDER — FENOFIBRATE 145 MG/1
145 TABLET ORAL
Refills: 0 | Status: DISCONTINUED | COMMUNITY
End: 2019-09-13

## 2019-09-13 RX ORDER — FUROSEMIDE 20 MG/1
20 TABLET ORAL DAILY
Qty: 30 | Refills: 0 | Status: DISCONTINUED | COMMUNITY
Start: 2018-01-18 | End: 2019-09-13

## 2019-09-18 ENCOUNTER — APPOINTMENT (OUTPATIENT)
Dept: INFUSION THERAPY | Facility: CLINIC | Age: 74
End: 2019-09-18

## 2019-09-18 RX ORDER — SODIUM CHLORIDE 9 MG/ML
500 INJECTION INTRAMUSCULAR; INTRAVENOUS; SUBCUTANEOUS
Refills: 0 | Status: DISCONTINUED | OUTPATIENT
Start: 2019-09-18 | End: 2020-04-13

## 2019-09-18 RX ADMIN — SODIUM CHLORIDE 500 MILLILITER(S): 9 INJECTION INTRAMUSCULAR; INTRAVENOUS; SUBCUTANEOUS at 14:00

## 2019-09-22 NOTE — REVIEW OF SYSTEMS
[Confusion] : confusion was observed [Memory Lapses Or Loss] : memory lapses or loss [Negative] : Heme/Lymph [Fever] : no fever [Shortness Of Breath] : no shortness of breath [Chest Pain] : no chest pain

## 2019-09-22 NOTE — PROCEDURE
[No] : not [Sinus Bradycardia] : sinus bradycardia [See Device Printout] : See device printout [Voltage: ___ volts] : Voltage was [unfilled] volts [Pacemaker] : pacemaker [Longevity: ___ months] : The estimated remaining battery life is [unfilled] months [Threshold Testing Performed] : Threshold testing was performed [Lead Imp:  ___ohms] : lead impedance was [unfilled] ohms [Sensing Amplitude ___mv] : sensing amplitude was [unfilled] mv [___V @] : [unfilled] V [___ ms] : [unfilled] ms [Asense-Vpace ___ %] : Asense-Vpace [unfilled]% [Asense-Vsense ___ %] : Asense-Vsense [unfilled]% [Apace-Vsense ___ %] : Apace-Vsense [unfilled]% [Apace-Vpace ___ %] : Apace-Vpace [unfilled]% [Programmed for Longevity] : output reprogrammed for improved battery longevity [de-identified] : Medtronic [de-identified] : [employer]visa  MRI [de-identified] : [id number] [de-identified] : 02/24/2015 [de-identified] : AAIR-DDDR [de-identified] : 60 [de-identified] : No significant events\par chronic high RV threshold. V paced 5.1%

## 2019-09-22 NOTE — ASSESSMENT
[FreeTextEntry1] : Seen and examined patient with Dr. George\par  Device interrogation \par Chronic high RV threshold - using it 5/1%\par otherwise, no event\par \par continue same medication\par \par Follow up in 6 mos.

## 2019-09-22 NOTE — HISTORY OF PRESENT ILLNESS
[de-identified] : PCP: Dr. Terry\par Cardiologist: Dr. Christensen\par \par 74 years old female with advancing dementia, PPM for SND, HTN, HLD, Hypothyroidism and Multiple myeloma.\par \par Denies any complaints like palpitations, sob, dizziness

## 2019-09-22 NOTE — PHYSICAL EXAM
[General Appearance - Well Developed] : well developed [Normal Appearance] : normal appearance [Heart Sounds] : normal S1 and S2 [] : no respiratory distress [Heart Rate And Rhythm] : heart rate and rhythm were normal [Auscultation Breath Sounds / Voice Sounds] : lungs were clear to auscultation bilaterally [Left Infraclavicular] : left infraclavicular area [Clean] : clean [Dry] : dry [Well-Healed] : well-healed [Bowel Sounds] : normal bowel sounds [Abdomen Soft] : soft [Cyanosis, Localized] : no localized cyanosis [FreeTextEntry1] : advancing dementia [Erythema] : not erythematous [Tender] : not tender [Indurated] : not indurated

## 2019-09-25 ENCOUNTER — APPOINTMENT (OUTPATIENT)
Dept: HEMATOLOGY ONCOLOGY | Facility: CLINIC | Age: 74
End: 2019-09-25
Payer: MEDICARE

## 2019-09-25 ENCOUNTER — APPOINTMENT (OUTPATIENT)
Dept: INFUSION THERAPY | Facility: CLINIC | Age: 74
End: 2019-09-25
Payer: MEDICARE

## 2019-09-25 PROCEDURE — 99213 OFFICE O/P EST LOW 20 MIN: CPT

## 2019-09-26 NOTE — PHYSICAL EXAM
[Restricted in physically strenuous activity but ambulatory and able to carry out work of a light or sedentary nature] : Status 1- Restricted in physically strenuous activity but ambulatory and able to carry out work of a light or sedentary nature, e.g., light house work, office work [Normal] : normal appearance, no rash, nodules, vesicles, ulcers, erythema [de-identified] : trace edema in Lower extremities on Lasix if needed  [de-identified] :  right arm scar [de-identified] : PPM [de-identified] : dementia, incontinence, ambulation difficulties but stable

## 2019-09-26 NOTE — REVIEW OF SYSTEMS
[Fatigue] : no fatigue [Recent Change In Weight] : ~T no recent weight change [Dry Eyes] : dryness of the eyes [Lower Ext Edema] : lower extremity edema [Incontinence] : incontinence [Joint Pain] : no joint pain [Muscle Pain] : no muscle pain [Muscle Weakness] : muscle weakness [Confused] : confusion [Difficulty Walking] : difficulty walking [Negative] : Heme/Lymph [de-identified] : Dementia

## 2019-09-26 NOTE — ASSESSMENT
[FreeTextEntry1] : #IgG Lamda Multiple Myeloma with lytic bone lesions, anemia and hypercalcemia  on presentation, normal LDH,  Beta 2 7.2. Repeat levels were as follows , Albumin was 4.0. repeat Beta 2- 4.8\par  FISH- hyperploidy and gain of CCN1 which puts her at  standard risk. \par   PET/CT multiple FDG avid lytic lesion consistent with myeloma.\par - Was on  VRd..  with low dose  she gets agitated on steroids\par -She achieved a VGPR and  was having issues with Dexa . Its hard to give her pills so was on Velcade maintenance , no neuropathy\par - The M-spike and lambda light chains went up on Velcade so we stared  Pomalyst in 03/2019 with falling paraprotines that are now stable\par \par Plan:\par - c/w Pomalyst only \par - c/w Zometa 3.5 mg  every 3 months, due in December\par - continue calcium and Vitamin D\par - off acyclovir, continue on Aspirin \par \par Pathologic Right Humerus fracture s/p fixation - Dr. Glenn Salter\par \par Dementia - most likely Alzheimers \par - Neurology following Jessica Aguiar  fax \par - stable on SSRI and Seroquel \par \par Anemia due to MM  and or treatment \par - stable, mild\par \par Hypercalcemia from MM\par - resolved \par \par Edema, Norvasc was stopped\par - edema has improved, no longer taking Lasix.  Norvasc was stopped. Compression stocking and elevation encouraged \par - decreased Irbesartan to 75mg, BP low and patient feeling weak recently, new rx sent\par \par ADRY due to dehydration, she is not drinking unless family tells her\par -on weekly IV fluids now\par \par RTC in 1 month with CBC, CMP, serum immunoelectrophoresis with next hydration.

## 2019-09-26 NOTE — HISTORY OF PRESENT ILLNESS
[Disease:__________________________] : Disease: [unfilled] [de-identified] : This is a 72 year old female with history of sick sinus syndrome s/p PPM and as well as progressive dementia over 1.5 years was to see a specialist in [facility] who presented to [facility] due to a fall  as a result of  worsening gait on 11/23/17.   Her work up demonstrated , left tibial swelling, Acute displaced fracture of the distal humeral diaphysis.\par Multiple lytic bone lesions.  CT neck Multiple lytic lesions,  CT head At least mild to moderate hydrocephalus involving lateral, third and fourth ventricles. Innumerable lytic lesions.\par \par She was seen by Neurology who recommended outpatient LP and  Orhto casted the right humerus fracture and recommended Oncologic Ortho evaluation.  \par \par On admission she was noted to be Hypercalcemic and in ADRY. She received pamidronate 60 mg once with IVF and her hypercalcemia  and ADRY  resolved.\par \par Work up demonstrated lambda 352, kappa 8.5 with ration of 0.02,  SPEP M spike of 2.7 , Beta 2 7.2. Urine Total protein of  284 H  Albumin was 4.0\par \par She underwent a bone marrow biopsy on 12/1/17 that showed  flow cytometry performed at [facility]    Oncology report - Clonal (IgG-Lambda) plasma cells (28.2% of total), normocellular to mildly hypercellular (30-40%)    with a sheet of plasma cells, a large aggregate of plasma cells and    markedly increased interstitial plasma cells.  Megakaryocytes are    present.  Maturing myeloid and erythroid precursors are present.  Amyloid    deposition is not identified.  Special stains for Amyloid (Crystal Violet    and Congo Red) are negative.  Stainable iron is present (1-2+).  \par \par She also had a left lower extremity hematoma evacuation.\par \par She is here with her kids [Therapy: ___] : Therapy: [unfilled] [Cycle: ___] : Cycle: [unfilled] [Day: ___] : Day: [unfilled] [FreeTextEntry1] : Started Pomalyst in 03/2019.\par VD was started  initially while we waited for R. On cycle 2 with R we stopped RD on  6/2018 and was on  maintenance Velcade progressed did not tolerate Daratumumab due to infusion time and was agitated and Started Pomalyst on March 29 2019  [de-identified] : 01/09/2018\par  Patient is here today with her family members for a follow up visit. So far got 4 doses of velcade and is currently on Day 8 of revlimid.  As per family, they noticed that her dementia is more worse  after starting VRD. Also reports Insomnia. She was evaluated by neurologist and so far, the  reason for her dementia is not established. Decision on Lumbar puncture is not yet made as neurologist did not think her symptoms were related to Normal pressure hydrocephalus.\par  ALso complaints of insomnia since being on VRD, likely from steriods. Her right arm has been healing well since the surgery and she will begin physical therapy soon. \par PET/CT results reviewed. Noted to have multiple FDG avid lytic lesions including right anterior second rib with SUV of 9.5.\par \par 1/25/18 \par She is here for follow up for her myeloma. Her  lambda started to fall and mspike went down as well she was only on Revlimid for 8 days,  Her GFR is also improving now 71.\par We decreased her dexamethasone to 16 mg po weekly due to psychosis. She now has mild ankle swelling in bilateral extremities. She now has urinary incontinence. Has bilateral hip pains in morning that improve with ambulation.  She started this cycle 2 on Teusday\par \par 2/15/18\par She is here for follow up. Her Neurologist in [facility] found a CT scan from [facility] back that also had hydrocephalus thus Alziemers is the cause of her dementia. [place] [facility] [place]RD was agreed with although their dosing recommendations was to move it to q28 days. We decided to keep things as is since they are use to this schedule. She small  blisters on eye lids small under 5 mm  one in each lower eye lid. They are not painful.  She is getting out of chair with ease. No other complaints> She also has repeat CT scan of head as per her neurologist results are bellow.\par \par 3/9/18\par She is here for follow up. She is dong well. She is here with her daughter. Trazodone is helping. No pains. He rash in the eyes has resolved they did not see optho.\par \par \par 3/28/18\par She is here for follow up with her son. She is doing well.  Son states she is physically better.  No complaints. Swelling in legs improved.\par \par 4/18/48\par She is here for follow up. She once again has Sty in her eyelids. It does not botehr her. she is doing well otherwise she has no pain.\par \par 5/10/18\par She is here for follow up with her daughter. HEr eyes are better using a wash. DId not see optho, Now on SSRI and Quatione?. Her light chain and Mspike keeps coming down\par \par 5/30/18 \par \par She is here for follow up. She is doing well. Her LIght chain is not normal. still had positive ROSALIND.  She started the Revlimid 3 days ago.\par \par 6/22/18\par She is here for follow up with daughter. She is having a hard time with mood swings with steroids so dexa was  stopped  it. \par \par 7/18/18\par She is here with her daughter. She is doing well. Still has some LE swelling.  No other issues. \par \par 9/12/18\par She is here for follow up. Doing well. HEre with her daughter. No new complaints. Myeloma panel stable \par \par 10/10/18\par Here with her daughter. Doing well. No issues> myeloma pane stable\par \par 11/7/18\par Patient is here for follow-up with her daughter.  Doing well.   No neuropathy.  We will continue monthly Zometa with one dose today.  We will also send the patient for a whole body PETscan.\par \par 12/5/18\par Patient is here for follow-up with her daughter.  The M-spike and lambda light chain is trending up but will continue current regimen.  The fact that there may be progression, we will continue with monthly Zometa for now.  They will do the PET/CT after the holidays.  Continue the Velcade. \par \par 1/30/19\par Patient is here for a follow-up visit with her son.  She is feeling well with no complaints.  Most recent CBC is stable.  Patient denies fever, chills, nausea, vomiting.  We will continue Zometa every 3 months.  The Lambda proteins are slowly increasing, now at 4.73 from 3.69 .  She is still taking the Acyclovir for prophylaxis.  \par She had a PET/CT to evaluate her bone disease that showed  Since December 14, 2017, overall decreased or resolved FDG uptake in  numerous lytic osseous lesions, consistent with good treatment response  (for example, 52% decrease in a right anterior second rib lesion now with  SUV of 4.6); highest SUV 5.8 in the right anterior 10th rib, 9% decrease.  2.  No new sites of abnormal FDG uptake.  \par \par 2/27/19\par Patient is here for a follow-up visit with her daughter.   Patient denies fever, chills, nausea, vomiting or any new rashes.  Her  passed away 2 weeks ago due to pulmonary fibrosis.  The daughter states that due to her dementia, she often forgets that he is not around and asks where he is.  \par \par 4/24/19\par She is here for follow up. Her involved light chain started to increase and was consistent on repeat testing. We stopped Velcade she not tolerate Daratumumab due to infusion time and was agitated and Started Pomaylst on March 29 2019. She is doing well.  We did not use a steroid since she gets agitated. No complaints.\par \par 5/22/19\par Patient is here for a follow-up visit with her daughter.  No new complaints.  Patient denies fever, chills, nausea, vomiting, or rashes.   She is tolerating the Pomalyst, her Lamda light chains are decreasing, they are 4.70 on 4/24.  \par \par 6/19/19\par Patient is here for a follow-up visit of MM with her son.  She is feeling well, only complaint is low BP (~90s/60s) with some weakness, they were instructed to decrease the Irbesartan to 75mg daily from 150mg.  Most recent myeloma panel is stable from last month.  She is on the off week of Pomalyst.  \par \par 7/31/19\par She is here with her son. she had ADRY due to dehydration. I started her on weekly hydration and it did improved. She is already on next cycle of Pomlyst. MM panel without progression.\par \par 8/28/19\par She is here with daughter. We have to administer  ml weekly since she does not drink at all anymore. Otherwise labs have not shown progression. Dementia is stable for now. \par \par 9/25/19\par She is here for follow up. She was a bit  agitated today. No new complains.   Most recent CBC showed slight fall in Hgb, creatine is a bit better. She could not stay for blood work due to agitation.

## 2019-09-26 NOTE — CONSULT LETTER
[Dear  ___] : Dear  [unfilled], [Referral Letter:] : I am referring [unfilled] to you for further evaluation.  My most recent evaluation follows. [Please see my note below.] : Please see my note below. [Referral Closing:] : Thank you very much for seeing this patient.  If you have any questions, please do not hesitate to contact me. [FreeTextEntry3] : Hemanth [Sincerely,] : Sincerely,

## 2019-10-02 ENCOUNTER — APPOINTMENT (OUTPATIENT)
Dept: INFUSION THERAPY | Facility: CLINIC | Age: 74
End: 2019-10-02

## 2019-10-02 ENCOUNTER — LABORATORY RESULT (OUTPATIENT)
Age: 74
End: 2019-10-02

## 2019-10-02 DIAGNOSIS — Z00.00 ENCOUNTER FOR GENERAL ADULT MEDICAL EXAMINATION W/OUT ABNORMAL FINDINGS: ICD-10-CM

## 2019-10-02 RX ORDER — SODIUM CHLORIDE 9 MG/ML
500 INJECTION INTRAMUSCULAR; INTRAVENOUS; SUBCUTANEOUS
Refills: 0 | Status: COMPLETED | OUTPATIENT
Start: 2019-10-02 | End: 2019-10-02

## 2019-10-02 RX ADMIN — SODIUM CHLORIDE 500 MILLILITER(S): 9 INJECTION INTRAMUSCULAR; INTRAVENOUS; SUBCUTANEOUS at 13:11

## 2019-10-02 RX ADMIN — SODIUM CHLORIDE 500 MILLILITER(S): 9 INJECTION INTRAMUSCULAR; INTRAVENOUS; SUBCUTANEOUS at 12:25

## 2019-10-03 LAB
ALBUMIN SERPL ELPH-MCNC: 3.8 G/DL
ALP BLD-CCNC: 54 U/L
ALT SERPL-CCNC: 11 U/L
ANION GAP SERPL CALC-SCNC: 14 MMOL/L
AST SERPL-CCNC: 18 U/L
BILIRUB SERPL-MCNC: 0.3 MG/DL
BUN SERPL-MCNC: 24 MG/DL
CALCIUM SERPL-MCNC: 8.6 MG/DL
CHLORIDE SERPL-SCNC: 109 MMOL/L
CO2 SERPL-SCNC: 19 MMOL/L
CREAT SERPL-MCNC: 1 MG/DL
GLUCOSE SERPL-MCNC: 91 MG/DL
HCT VFR BLD CALC: 30.6 %
HGB BLD-MCNC: 10.1 G/DL
MCHC RBC-ENTMCNC: 33 G/DL
MCHC RBC-ENTMCNC: 34.5 PG
MCV RBC AUTO: 104.4 FL
PLATELET # BLD AUTO: 196 K/UL
PMV BLD: 9.2 FL
POTASSIUM SERPL-SCNC: 4.4 MMOL/L
PROT SERPL-MCNC: 6.2 G/DL
RBC # BLD: 2.93 M/UL
RBC # FLD: 14.7 %
SODIUM SERPL-SCNC: 142 MMOL/L
WBC # FLD AUTO: 4.86 K/UL

## 2019-10-04 ENCOUNTER — RX RENEWAL (OUTPATIENT)
Age: 74
End: 2019-10-04

## 2019-10-09 ENCOUNTER — APPOINTMENT (OUTPATIENT)
Dept: INFUSION THERAPY | Facility: CLINIC | Age: 74
End: 2019-10-09

## 2019-10-09 ENCOUNTER — LABORATORY RESULT (OUTPATIENT)
Age: 74
End: 2019-10-09

## 2019-10-09 RX ORDER — SODIUM CHLORIDE 9 MG/ML
500 INJECTION INTRAMUSCULAR; INTRAVENOUS; SUBCUTANEOUS
Refills: 0 | Status: COMPLETED | OUTPATIENT
Start: 2019-10-09 | End: 2019-10-09

## 2019-10-09 RX ADMIN — SODIUM CHLORIDE 500 MILLILITER(S): 9 INJECTION INTRAMUSCULAR; INTRAVENOUS; SUBCUTANEOUS at 12:15

## 2019-10-10 LAB
ALBUMIN SERPL ELPH-MCNC: 3.9 G/DL
ALP BLD-CCNC: 65 U/L
ALT SERPL-CCNC: 16 U/L
ANION GAP SERPL CALC-SCNC: 12 MMOL/L
AST SERPL-CCNC: 19 U/L
BILIRUB SERPL-MCNC: 0.6 MG/DL
BUN SERPL-MCNC: 22 MG/DL
CALCIUM SERPL-MCNC: 8.4 MG/DL
CHLORIDE SERPL-SCNC: 103 MMOL/L
CO2 SERPL-SCNC: 24 MMOL/L
CREAT SERPL-MCNC: 1 MG/DL
GLUCOSE SERPL-MCNC: 77 MG/DL
HCT VFR BLD CALC: 30.4 %
HGB BLD-MCNC: 10.2 G/DL
MCHC RBC-ENTMCNC: 33.6 G/DL
MCHC RBC-ENTMCNC: 33.6 PG
MCV RBC AUTO: 100 FL
PLATELET # BLD AUTO: 167 K/UL
PMV BLD: 9.3 FL
POTASSIUM SERPL-SCNC: 4 MMOL/L
PROT SERPL-MCNC: 6.2 G/DL
RBC # BLD: 3.04 M/UL
RBC # FLD: 15.3 %
SODIUM SERPL-SCNC: 139 MMOL/L
WBC # FLD AUTO: 2.89 K/UL

## 2019-10-15 LAB
ALBUMIN MFR SERPL ELPH: 56.7 %
ALBUMIN SERPL-MCNC: 3.6 G/DL
ALBUMIN/GLOB SERPL: 1.3 RATIO
ALPHA1 GLOB MFR SERPL ELPH: 6.5 %
ALPHA1 GLOB SERPL ELPH-MCNC: 0.4 G/DL
ALPHA2 GLOB MFR SERPL ELPH: 11.2 %
ALPHA2 GLOB SERPL ELPH-MCNC: 0.7 G/DL
B-GLOBULIN MFR SERPL ELPH: 11.5 %
B-GLOBULIN SERPL ELPH-MCNC: 0.7 G/DL
DEPRECATED KAPPA LC FREE/LAMBDA SER: 0.42 RATIO
GAMMA GLOB FLD ELPH-MCNC: 0.9 G/DL
GAMMA GLOB MFR SERPL ELPH: 14.1 %
IGA SER QL IEP: 100 MG/DL
IGG SER QL IEP: 988 MG/DL
IGM SER QL IEP: 24 MG/DL
INTERPRETATION SERPL IEP-IMP: NORMAL
KAPPA LC CSF-MCNC: 4.98 MG/DL
KAPPA LC SERPL-MCNC: 2.07 MG/DL
M PROTEIN MFR SERPL ELPH: 5.3 %
M PROTEIN SPEC IFE-MCNC: NORMAL
MONOCLON BAND OBS SERPL: 0.3 G/DL
PROT SERPL-MCNC: 6.3 G/DL
PROT SERPL-MCNC: 6.3 G/DL

## 2019-10-16 ENCOUNTER — APPOINTMENT (OUTPATIENT)
Dept: INFUSION THERAPY | Facility: CLINIC | Age: 74
End: 2019-10-16

## 2019-10-23 ENCOUNTER — APPOINTMENT (OUTPATIENT)
Dept: INFUSION THERAPY | Facility: CLINIC | Age: 74
End: 2019-10-23

## 2019-10-30 ENCOUNTER — CHART COPY (OUTPATIENT)
Age: 74
End: 2019-10-30

## 2019-10-30 ENCOUNTER — APPOINTMENT (OUTPATIENT)
Dept: INFUSION THERAPY | Facility: CLINIC | Age: 74
End: 2019-10-30
Payer: MEDICARE

## 2019-10-30 ENCOUNTER — APPOINTMENT (OUTPATIENT)
Dept: HEMATOLOGY ONCOLOGY | Facility: CLINIC | Age: 74
End: 2019-10-30
Payer: MEDICARE

## 2019-10-30 ENCOUNTER — LABORATORY RESULT (OUTPATIENT)
Age: 74
End: 2019-10-30

## 2019-10-30 VITALS
BODY MASS INDEX: 25.07 KG/M2 | HEIGHT: 66 IN | DIASTOLIC BLOOD PRESSURE: 54 MMHG | WEIGHT: 156 LBS | TEMPERATURE: 97.4 F | SYSTOLIC BLOOD PRESSURE: 105 MMHG | HEART RATE: 91 BPM | RESPIRATION RATE: 14 BRPM

## 2019-10-30 PROCEDURE — 99214 OFFICE O/P EST MOD 30 MIN: CPT

## 2019-10-31 ENCOUNTER — RX RENEWAL (OUTPATIENT)
Age: 74
End: 2019-10-31

## 2019-10-31 LAB
HCT VFR BLD CALC: 31.4 %
HGB BLD-MCNC: 10.8 G/DL
MCHC RBC-ENTMCNC: 33.9 PG
MCHC RBC-ENTMCNC: 34.4 G/DL
MCV RBC AUTO: 98.4 FL
PLATELET # BLD AUTO: 181 K/UL
PMV BLD: 9.1 FL
RBC # BLD: 3.19 M/UL
RBC # FLD: 14.2 %
WBC # FLD AUTO: 4.73 K/UL

## 2019-11-01 NOTE — PHYSICAL EXAM
[Restricted in physically strenuous activity but ambulatory and able to carry out work of a light or sedentary nature] : Status 1- Restricted in physically strenuous activity but ambulatory and able to carry out work of a light or sedentary nature, e.g., light house work, office work [Normal] : normal appearance, no rash, nodules, vesicles, ulcers, erythema [de-identified] : PPM [de-identified] : trace edema in Lower extremities on Lasix if needed  [de-identified] :  right arm scar [de-identified] : dementia, incontinence, ambulation difficulties but stable

## 2019-11-01 NOTE — ASSESSMENT
[FreeTextEntry1] : #IgG Lamda Multiple Myeloma with lytic bone lesions, anemia and hypercalcemia  on presentation, normal LDH,  Beta 2 7.2. Repeat levels were as follows , Albumin was 4.0. repeat Beta 2- 4.8\par  FISH- hyperploidy and gain of CCN1 which puts her at  standard risk.  PET/CT multiple FDG avid lytic lesion consistent with myeloma. Was on  VRd..  with low dose  she gets agitated on steroids\par - She achieved a VGPR and  was having issues with Dexa . Its hard to give her pills so was on Velcade maintenance , no neuropathy\par - The M-spike and lambda light chains went up on Velcade so we started  Pomalyst in 03/2019 with falling paraprotines that are now stable\par \par Plan:\par - c/w Pomalyst 3 weeks on, 1 week off; we had a discussion that since her dementia is worsening and suspect will be the cause of her mortality that at some point will stop all myeloma related medications\par - c/w Zometa 3.5 mg  every 3 months; last given in 8/2019, due in November\par - continue calcium and Vitamin D\par - off acyclovir, continue on Aspirin \par \par #Pathologic Right Humerus fracture s/p fixation - Dr. Glenn Salter\par \par #Dementia - most likely Alzheimers \par - Neurology following Jessica Aguiar  fax \par - on SSRI and Seroquel \par - worsening as per family\par \par #Anemia due to MM  and or treatment \par - stable, mild\par \par #Hypercalcemia from MM\par - resolved \par \par #Edema\par - edema has improved, no longer taking Lasix.  Norvasc was stopped\par - Compression stocking and elevation encouraged \par - decreased Irbesartan to 75mg 2/2 low BP\par \par #ADRY due to dehydration, she is not drinking unless family tells her\par - continue with IV fluids 500cc every other week  now\par \par RTC in 1 month with CBC, CMP, serum immunoelectrophoresis with hydration [Palliative] : Goals of care discussed with patient: Palliative

## 2019-11-01 NOTE — REVIEW OF SYSTEMS
[Dry Eyes] : dryness of the eyes [Lower Ext Edema] : lower extremity edema [Incontinence] : incontinence [Muscle Weakness] : muscle weakness [Confused] : confusion [Difficulty Walking] : difficulty walking [Negative] : Heme/Lymph [Fatigue] : no fatigue [Recent Change In Weight] : ~T no recent weight change [Joint Pain] : no joint pain [Muscle Pain] : no muscle pain [Insomnia] : insomnia [de-identified] : Dementia

## 2019-11-01 NOTE — HISTORY OF PRESENT ILLNESS
[Disease:__________________________] : Disease: [unfilled] [Therapy: ___] : Therapy: [unfilled] [Cycle: ___] : Cycle: [unfilled] [Day: ___] : Day: [unfilled] [de-identified] : This is a 72 year old female with history of sick sinus syndrome s/p PPM and as well as progressive dementia over 1.5 years was to see a specialist in [facility] who presented to [facility] due to a fall  as a result of  worsening gait on 11/23/17.   Her work up demonstrated , left tibial swelling, Acute displaced fracture of the distal humeral diaphysis.\par Multiple lytic bone lesions.  CT neck Multiple lytic lesions,  CT head At least mild to moderate hydrocephalus involving lateral, third and fourth ventricles. Innumerable lytic lesions.\par \par She was seen by Neurology who recommended outpatient LP and  Orhto casted the right humerus fracture and recommended Oncologic Ortho evaluation.  \par \par On admission she was noted to be Hypercalcemic and in ADRY. She received pamidronate 60 mg once with IVF and her hypercalcemia  and ADRY  resolved.\par \par Work up demonstrated lambda 352, kappa 8.5 with ration of 0.02,  SPEP M spike of 2.7 , Beta 2 7.2. Urine Total protein of  284 H  Albumin was 4.0\par \par She underwent a bone marrow biopsy on 12/1/17 that showed  flow cytometry performed at [facility]    Oncology report - Clonal (IgG-Lambda) plasma cells (28.2% of total), normocellular to mildly hypercellular (30-40%)    with a sheet of plasma cells, a large aggregate of plasma cells and    markedly increased interstitial plasma cells.  Megakaryocytes are    present.  Maturing myeloid and erythroid precursors are present.  Amyloid    deposition is not identified.  Special stains for Amyloid (Crystal Violet    and Congo Red) are negative.  Stainable iron is present (1-2+).  \par \par She also had a left lower extremity hematoma evacuation.\par \par She is here with her kids [FreeTextEntry1] : Started Pomalyst in 03/2019.\par VD was started  initially while we waited for R. On cycle 2 with R we stopped RD on  6/2018 and was on  maintenance Velcade progressed did not tolerate Daratumumab due to infusion time and was agitated and Started Pomalyst on March 29 2019  [de-identified] : 01/09/2018\par  Patient is here today with her family members for a follow up visit. So far got 4 doses of velcade and is currently on Day 8 of revlimid.  As per family, they noticed that her dementia is more worse  after starting VRD. Also reports Insomnia. She was evaluated by neurologist and so far, the  reason for her dementia is not established. Decision on Lumbar puncture is not yet made as neurologist did not think her symptoms were related to Normal pressure hydrocephalus.\par  ALso complaints of insomnia since being on VRD, likely from steriods. Her right arm has been healing well since the surgery and she will begin physical therapy soon. \par PET/CT results reviewed. Noted to have multiple FDG avid lytic lesions including right anterior second rib with SUV of 9.5.\par \par 1/25/18 \par She is here for follow up for her myeloma. Her  lambda started to fall and mspike went down as well she was only on Revlimid for 8 days,  Her GFR is also improving now 71.\par We decreased her dexamethasone to 16 mg po weekly due to psychosis. She now has mild ankle swelling in bilateral extremities. She now has urinary incontinence. Has bilateral hip pains in morning that improve with ambulation.  She started this cycle 2 on Teusday\par \par 2/15/18\par She is here for follow up. Her Neurologist in [facility] found [facility] CT scan [facility] back that also had hydrocephalus thus Alziemers is the cause of her dementia. [place] [facility] was agreed with although their dosing recommendations was to move it to q28 days. We decided to keep things as is since they are use to this schedule. She small  blisters on eye lids small under 5 mm  one in each lower eye lid. They are not painful.  She is getting out of chair with ease. No other complaints> She also has repeat CT scan of head as per her neurologist results are bellow.\par \par 3/9/18\par She is here for follow up. She is dong well. She is here with her daughter. Trazodone is helping. No pains. He rash in the eyes has resolved they did not see optho.\par \par \par 3/28/18\par She is here for follow up with her son. She is doing well.  Son states she is physically better.  No complaints. Swelling in legs improved.\par \par 4/18/48\par She is here for follow up. She once again has Sty in her eyelids. It does not botehr her. she is doing well otherwise she has no pain.\par \par 5/10/18\par She is here for follow up with her daughter. HEr eyes are better using a wash. DId not see optho, Now on SSRI and Quatione?. Her light chain and Mspike keeps coming down\par \par 5/30/18 \par \par She is here for follow up. She is doing well. Her LIght chain is not normal. still had positive ROSALIND.  She started the Revlimid 3 days ago.\par \par 6/22/18\par She is here for follow up with daughter. She is having a hard time with mood swings with steroids so dexa was  stopped  it. \par \par 7/18/18\par She is here with her daughter. She is doing well. Still has some LE swelling.  No other issues. \par \par 9/12/18\par She is here for follow up. Doing well. HEre with her daughter. No new complaints. Myeloma panel stable \par \par 10/10/18\par Here with her daughter. Doing well. No issues> myeloma pane stable\par \par 11/7/18\par Patient is here for follow-up with her daughter.  Doing well.   No neuropathy.  We will continue monthly Zometa with one dose today.  We will also send the patient for a whole body PETscan.\par \par 12/5/18\par Patient is here for follow-up with her daughter.  The M-spike and lambda light chain is trending up but will continue current regimen.  The fact that there may be progression, we will continue with monthly Zometa for now.  They will do the PET/CT after the holidays.  Continue the Velcade. \par \par 1/30/19\par Patient is here for a follow-up visit with her son.  She is feeling well with no complaints.  Most recent CBC is stable.  Patient denies fever, chills, nausea, vomiting.  We will continue Zometa every 3 months.  The Lambda proteins are slowly increasing, now at 4.73 from 3.69 .  She is still taking the Acyclovir for prophylaxis.  \par She had a PET/CT to evaluate her bone disease that showed  Since December 14, 2017, overall decreased or resolved FDG uptake in  numerous lytic osseous lesions, consistent with good treatment response  (for example, 52% decrease in a right anterior second rib lesion now with  SUV of 4.6); highest SUV 5.8 in the right anterior 10th rib, 9% decrease.  2.  No new sites of abnormal FDG uptake.  \par \par 2/27/19\par Patient is here for a follow-up visit with her daughter.   Patient denies fever, chills, nausea, vomiting or any new rashes.  Her  passed away 2 weeks ago due to pulmonary fibrosis.  The daughter states that due to her dementia, she often forgets that he is not around and asks where he is.  \par \par 4/24/19\par She is here for follow up. Her involved light chain started to increase and was consistent on repeat testing. We stopped Velcade she not tolerate Daratumumab due to infusion time and was agitated and Started Pomaylst on March 29 2019. She is doing well.  We did not use a steroid since she gets agitated. No complaints.\par \par 5/22/19\par Patient is here for a follow-up visit with her daughter.  No new complaints.  Patient denies fever, chills, nausea, vomiting, or rashes.   She is tolerating the Pomalyst, her Lamda light chains are decreasing, they are 4.70 on 4/24.  \par \par 6/19/19\par Patient is here for a follow-up visit of MM with her son.  She is feeling well, only complaint is low BP (~90s/60s) with some weakness, they were instructed to decrease the Irbesartan to 75mg daily from 150mg.  Most recent myeloma panel is stable from last month.  She is on the off week of Pomalyst.  \par \par 7/31/19\par She is here with her son. she had ADRY due to dehydration. I started her on weekly hydration and it did improved. She is already on next cycle of Pomlyst. MM panel without progression.\par \par 8/28/19\par She is here with daughter. We have to administer  ml weekly since she does not drink at all anymore. Otherwise labs have not shown progression. Dementia is stable for now. \par \par 9/25/19\par She is here for follow up. She was a bit  agitated today. No new complains.   Most recent CBC showed slight fall in Hgb, creatine is a bit better. She could not stay for blood work due to agitation.\par \par 10/30/19\par Patient is here for a follow-up visit of MM.  Most recent CBC is stable.  Patient denies fever, chills, nausea, vomiting.  She is here with daughter and son Dale. We have been administering  ml weekly, since she does not drink at all anymore and must be encouraged.  Most recent labwork from 10.9.19 shows M-spike now 0.3. She will complete this cycle on Sunday.  She is tolerating the Pomalyst; however, family states administration is difficult.  Her walking is worsening and her dementia is also progressing.  She  No diarrhea, dyspnea or edema.   She does have a 24hr aid at home to assist. She now has incontinence.   They saw neurologist last week.

## 2019-11-06 ENCOUNTER — APPOINTMENT (OUTPATIENT)
Dept: INFUSION THERAPY | Facility: CLINIC | Age: 74
End: 2019-11-06

## 2019-11-06 ENCOUNTER — APPOINTMENT (OUTPATIENT)
Dept: GERIATRICS | Facility: HOME HEALTH | Age: 74
End: 2019-11-06
Payer: MEDICARE

## 2019-11-06 PROCEDURE — G0008: CPT

## 2019-11-06 PROCEDURE — 99349 HOME/RES VST EST MOD MDM 40: CPT | Mod: 25

## 2019-11-06 PROCEDURE — 90686 IIV4 VACC NO PRSV 0.5 ML IM: CPT

## 2019-11-07 NOTE — HISTORY OF PRESENT ILLNESS
[FreeTextEntry1] : Pt is homebound due to her dementia. and gait instability. Sleep and appetite are fairly well.c/o generalized aches. mood is quiet stabilized w infrequent episodes of anxiety [0] : 1) Little interest or pleasure doing things: Not at all [1] : 2) Feeling down, depressed, or hopeless for several days

## 2019-11-07 NOTE — PHYSICAL EXAM
[General Appearance - In No Acute Distress] : in no acute distress [Sclera] : the sclera and conjunctiva were normal [PERRL With Normal Accommodation] : pupils were equal in size, round, and reactive to light [Extraocular Movements] : extraocular movements were intact [Outer Ear] : the ears and nose were normal in appearance [Oropharynx] : the oropharynx was normal [Neck Cervical Mass (___cm)] : no neck mass was observed [Jugular Venous Distention Increased] : there was no jugular-venous distention [Neck Appearance] : the appearance of the neck was normal [Thyroid Diffuse Enlargement] : the thyroid was not enlarged [Thyroid Nodule] : there were no palpable thyroid nodules [Auscultation Breath Sounds / Voice Sounds] : lungs were clear to auscultation bilaterally [Heart Rate And Rhythm] : heart rate was normal and rhythm regular [Heart Sounds] : normal S1 and S2 [Heart Sounds Gallop] : no gallops [Murmurs] : no murmurs [Pitting Edema] : pitting edema present [___ +] : bilateral [unfilled]+ pitting edema to the ankles [Heart Sounds Pericardial Friction Rub] : no pericardial rub [Breast Appearance] : normal in appearance [Bowel Sounds] : normal bowel sounds [Breast Palpation Mass] : no palpable masses [Abdomen Soft] : soft [Abdomen Tenderness] : non-tender [Abdomen Mass (___ Cm)] : no abdominal mass palpated [Ataxic] : ataxic [Skin Color & Pigmentation] : normal skin color and pigmentation [Skin Turgor] : normal skin turgor [] : no rash [Oriented to Person] : oriented to person [FreeTextEntry1] : motor and sensory deficits LE b/l [Oriented to Place] : oriented to place

## 2019-11-07 NOTE — REVIEW OF SYSTEMS
[Heart Rate Is Fast] : fast heart rate [Incontinence] : incontinence [Arthralgias] : arthralgias [Limb Weakness] : limb weakness [Joint Stiffness] : joint stiffness [Depression] : depression [Difficulty Walking] : difficulty walking [Change In Personality] : personality change [Emotional Problems] : emotional problems [Negative] : Respiratory

## 2019-11-13 ENCOUNTER — APPOINTMENT (OUTPATIENT)
Dept: INFUSION THERAPY | Facility: CLINIC | Age: 74
End: 2019-11-13

## 2019-11-13 RX ORDER — SODIUM CHLORIDE 9 MG/ML
500 INJECTION INTRAMUSCULAR; INTRAVENOUS; SUBCUTANEOUS
Refills: 0 | Status: DISCONTINUED | OUTPATIENT
Start: 2019-11-13 | End: 2020-04-13

## 2019-11-13 RX ORDER — ZOLEDRONIC ACID 5 MG/100ML
3.5 INJECTION, SOLUTION INTRAVENOUS ONCE
Refills: 0 | Status: COMPLETED | OUTPATIENT
Start: 2019-11-13 | End: 2019-11-13

## 2019-11-13 RX ADMIN — ZOLEDRONIC ACID 200 MILLIGRAM(S): 5 INJECTION, SOLUTION INTRAVENOUS at 15:54

## 2019-11-13 RX ADMIN — SODIUM CHLORIDE 500 MILLILITER(S): 9 INJECTION INTRAMUSCULAR; INTRAVENOUS; SUBCUTANEOUS at 15:14

## 2019-11-13 RX ADMIN — ZOLEDRONIC ACID 3.5 MILLIGRAM(S): 5 INJECTION, SOLUTION INTRAVENOUS at 16:30

## 2019-11-19 ENCOUNTER — RX RENEWAL (OUTPATIENT)
Age: 74
End: 2019-11-19

## 2019-11-27 ENCOUNTER — APPOINTMENT (OUTPATIENT)
Dept: INFUSION THERAPY | Facility: CLINIC | Age: 74
End: 2019-11-27
Payer: MEDICARE

## 2019-11-27 ENCOUNTER — APPOINTMENT (OUTPATIENT)
Dept: HEMATOLOGY ONCOLOGY | Facility: CLINIC | Age: 74
End: 2019-11-27
Payer: MEDICARE

## 2019-11-27 ENCOUNTER — LABORATORY RESULT (OUTPATIENT)
Age: 74
End: 2019-11-27

## 2019-11-27 VITALS
HEART RATE: 83 BPM | WEIGHT: 156 LBS | RESPIRATION RATE: 14 BRPM | SYSTOLIC BLOOD PRESSURE: 107 MMHG | HEIGHT: 66 IN | BODY MASS INDEX: 25.07 KG/M2 | DIASTOLIC BLOOD PRESSURE: 62 MMHG

## 2019-11-27 LAB
HCT VFR BLD CALC: 31.7 %
HGB BLD-MCNC: 11 G/DL
MCHC RBC-ENTMCNC: 34.1 PG
MCHC RBC-ENTMCNC: 34.7 G/DL
MCV RBC AUTO: 98.1 FL
PLATELET # BLD AUTO: 150 K/UL
PMV BLD: 9.2 FL
RBC # BLD: 3.23 M/UL
RBC # FLD: 14.8 %
WBC # FLD AUTO: 4.81 K/UL

## 2019-11-27 PROCEDURE — 99213 OFFICE O/P EST LOW 20 MIN: CPT

## 2019-11-27 NOTE — PHYSICAL EXAM
[Restricted in physically strenuous activity but ambulatory and able to carry out work of a light or sedentary nature] : Status 1- Restricted in physically strenuous activity but ambulatory and able to carry out work of a light or sedentary nature, e.g., light house work, office work [Normal] : normal appearance, no rash, nodules, vesicles, ulcers, erythema [de-identified] : PPM [de-identified] : trace edema in Lower extremities on Lasix if needed  [de-identified] :  right arm scar [de-identified] : dementia, incontinence, ambulation difficulties but stable

## 2019-11-27 NOTE — REVIEW OF SYSTEMS
[Dry Eyes] : dryness of the eyes [Lower Ext Edema] : lower extremity edema [Incontinence] : incontinence [Muscle Weakness] : muscle weakness [Confused] : confusion [Difficulty Walking] : difficulty walking [Insomnia] : insomnia [Negative] : Heme/Lymph [Fatigue] : no fatigue [Recent Change In Weight] : ~T no recent weight change [Joint Pain] : no joint pain [Muscle Pain] : no muscle pain [de-identified] : Dementia

## 2019-11-27 NOTE — ASSESSMENT
[Palliative] : Goals of care discussed with patient: Palliative [FreeTextEntry1] : #IgG Lamda Multiple Myeloma with lytic bone lesions, anemia and hypercalcemia  on presentation, normal LDH,  Beta 2 7.2. Repeat levels were as follows , Albumin was 4.0. repeat Beta 2- 4.8\par  FISH- hyperploidy and gain of CCN1 which puts her at  standard risk.  PET/CT multiple FDG avid lytic lesion consistent with myeloma. Was on  VRd..  with low dose  she gets agitated on steroids\par - She achieved a VGPR and  was having issues with Dexa . Its hard to give her pills so was on Velcade maintenance , no neuropathy\par - The M-spike and lambda light chains went up on Velcade so we started  Pomalyst in 03/2019 with falling paraprotines that are now stable\par \par Plan:\par - c/w Pomalyst 3 weeks on, 1 week off; we had a discussion that since her dementia is worsening and suspect will be the cause of her mortality that at some point will stop all myeloma related medications if that is the yvette \par - c/w Zometa 3.5 mg  every 3 months; last given  in November\par - continue calcium and Vitamin D\par - off acyclovir, continue on Aspirin \par \par #Pathologic Right Humerus fracture s/p fixation - Dr. Glenn Salter\par \par #Dementia - most likely Alzheimers \par - Neurology following Jessica Aguiar  fax \par - on SSRI and Seroquel \par - worsening as per family\par \par #Anemia due to MM  and or treatment \par - stable, mild\par \par #Hypercalcemia from MM\par - resolved \par \par #Edema\par - edema has improved, no longer taking Lasix.  Norvasc was stopped\par - Compression stocking and elevation encouraged \par - on  Irbesartan to 75mg 2/2 low BP\par \par #ADRY due to dehydration, she is not drinking unless family tells her\par - continue with IV fluids 500cc every other week  now\par \par RTC in 1 month with CBC, CMP, serum immunoelectrophoresis with hydration

## 2019-11-27 NOTE — HISTORY OF PRESENT ILLNESS
[Disease:__________________________] : Disease: [unfilled] [Therapy: ___] : Therapy: [unfilled] [Cycle: ___] : Cycle: [unfilled] [Day: ___] : Day: [unfilled] [de-identified] : This is a 72 year old female with history of sick sinus syndrome s/p PPM and as well as progressive dementia over 1.5 years was to see a specialist in [facility] who presented to [facility] due to a fall  as a result of  worsening gait on 11/23/17.   Her work up demonstrated , left tibial swelling, Acute displaced fracture of the distal humeral diaphysis.\par Multiple lytic bone lesions.  CT neck Multiple lytic lesions,  CT head At least mild to moderate hydrocephalus involving lateral, third and fourth ventricles. Innumerable lytic lesions.\par \par She was seen by Neurology who recommended outpatient LP and  Orhto casted the right humerus fracture and recommended Oncologic Ortho evaluation.  \par \par On admission she was noted to be Hypercalcemic and in ADRY. She received pamidronate 60 mg once with IVF and her hypercalcemia  and ADRY  resolved.\par \par Work up demonstrated lambda 352, kappa 8.5 with ration of 0.02,  SPEP M spike of 2.7 , Beta 2 7.2. Urine Total protein of  284 H  Albumin was 4.0\par \par She underwent a bone marrow biopsy on 12/1/17 that showed  flow cytometry performed at [facility]    Oncology report - Clonal (IgG-Lambda) plasma cells (28.2% of total), normocellular to mildly hypercellular (30-40%)    with a sheet of plasma cells, a large aggregate of plasma cells and    markedly increased interstitial plasma cells.  Megakaryocytes are    present.  Maturing myeloid and erythroid precursors are present.  Amyloid    deposition is not identified.  Special stains for Amyloid (Crystal Violet    and Congo Red) are negative.  Stainable iron is present (1-2+).  \par \par She also had a left lower extremity hematoma evacuation.\par \par She is here with her kids [FreeTextEntry1] : Started Pomalyst in 03/2019.\par VD was started  initially while we waited for R. On cycle 2 with R we stopped RD on  6/2018 and was on  maintenance Velcade progressed did not tolerate Daratumumab due to infusion time and was agitated and Started Pomalyst on March 29 2019  [de-identified] : 01/09/2018\par  Patient is here today with her family members for a follow up visit. So far got 4 doses of velcade and is currently on Day 8 of revlimid.  As per family, they noticed that her dementia is more worse  after starting VRD. Also reports Insomnia. She was evaluated by neurologist and so far, the  reason for her dementia is not established. Decision on Lumbar puncture is not yet made as neurologist did not think her symptoms were related to Normal pressure hydrocephalus.\par  ALso complaints of insomnia since being on VRD, likely from steriods. Her right arm has been healing well since the surgery and she will begin physical therapy soon. \par PET/CT results reviewed. Noted to have multiple FDG avid lytic lesions including right anterior second rib with SUV of 9.5.\par \par 1/25/18 \par She is here for follow up for her myeloma. Her  lambda started to fall and mspike went down as well she was only on Revlimid for 8 days,  Her GFR is also improving now 71.\par We decreased her dexamethasone to 16 mg po weekly due to psychosis. She now has mild ankle swelling in bilateral extremities. She now has urinary incontinence. Has bilateral hip pains in morning that improve with ambulation.  She started this cycle 2 on Teusday\par \par 2/15/18\par She is here for follow up. Her Neurologist in [facility] found a CT scan from [facility] back that also had hydrocephalus thus Alziemers is the cause of her dementia. They were seen in [facility] and VRD was agreed with although their dosing recommendations was to move it to q28 days. We decided to keep things as is since they are use to this schedule. She small  blisters on eye lids small under 5 mm  one in each lower eye lid. They are not painful.  She is getting out of chair with ease. No other complaints> She also has repeat CT scan of head as per her neurologist results are bellow.\par \par 3/9/18\par She is here for follow up. She is dong well. She is here with her daughter. Trazodone is helping. No pains. He rash in the eyes has resolved they did not see optho.\par \par \par 3/28/18\par She is here for follow up with her son. She is doing well.  Son states she is physically better.  No complaints. Swelling in legs improved.\par \par 4/18/48\par She is here for follow up. She once again has Sty in her eyelids. It does not botehr her. she is doing well otherwise she has no pain.\par \par 5/10/18\par She is here for follow up with her daughter. HEr eyes are better using a wash. DId not see optho, Now on SSRI and Quatione?. Her light chain and Mspike keeps coming down\par \par 5/30/18 \par \par She is here for follow up. She is doing well. Her LIght chain is not normal. still had positive ROSALIND.  She started the Revlimid 3 days ago.\par \par 6/22/18\par She is here for follow up with daughter. She is having a hard time with mood swings with steroids so dexa was  stopped  it. \par \par 7/18/18\par She is here with her daughter. She is doing well. Still has some LE swelling.  No other issues. \par \par 9/12/18\par She is here for follow up. Doing well. HEre with her daughter. No new complaints. Myeloma panel stable \par \par 10/10/18\par Here with her daughter. Doing well. No issues> myeloma pane stable\par \par 11/7/18\par Patient is here for follow-up with her daughter.  Doing well.   No neuropathy.  We will continue monthly Zometa with one dose today.  We will also send the patient for a whole body PETscan.\par \par 12/5/18\par Patient is here for follow-up with her daughter.  The M-spike and lambda light chain is trending up but will continue current regimen.  The fact that there may be progression, we will continue with monthly Zometa for now.  They will do the PET/CT after the holidays.  Continue the Velcade. \par \par 1/30/19\par Patient is here for a follow-up visit with her son.  She is feeling well with no complaints.  Most recent CBC is stable.  Patient denies fever, chills, nausea, vomiting.  We will continue Zometa every 3 months.  The Lambda proteins are slowly increasing, now at 4.73 from 3.69 .  She is still taking the Acyclovir for prophylaxis.  \par She had a PET/CT to evaluate her bone disease that showed  Since December 14, 2017, overall decreased or resolved FDG uptake in  numerous lytic osseous lesions, consistent with good treatment response  (for example, 52% decrease in a right anterior second rib lesion now with  SUV of 4.6); highest SUV 5.8 in the right anterior 10th rib, 9% decrease.  2.  No new sites of abnormal FDG uptake.  \par \par 2/27/19\par Patient is here for a follow-up visit with her daughter.   Patient denies fever, chills, nausea, vomiting or any new rashes.  Her  passed away 2 weeks ago due to pulmonary fibrosis.  The daughter states that due to her dementia, she often forgets that he is not around and asks where he is.  \par \par 4/24/19\par She is here for follow up. Her involved light chain started to increase and was consistent on repeat testing. We stopped Velcade she not tolerate Daratumumab due to infusion time and was agitated and Started Pomaylst on March 29 2019. She is doing well.  We did not use a steroid since she gets agitated. No complaints.\par \par 5/22/19\par Patient is here for a follow-up visit with her daughter.  No new complaints.  Patient denies fever, chills, nausea, vomiting, or rashes.   She is tolerating the Pomalyst, her Lamda light chains are decreasing, they are 4.70 on 4/24.  \par \par 6/19/19\par Patient is here for a follow-up visit of MM with her son.  She is feeling well, only complaint is low BP (~90s/60s) with some weakness, they were instructed to decrease the Irbesartan to 75mg daily from 150mg.  Most recent myeloma panel is stable from last month.  She is on the off week of Pomalyst.  \par \par 7/31/19\par She is here with her son. she had ADRY due to dehydration. I started her on weekly hydration and it did improved. She is already on next cycle of Pomlyst. MM panel without progression.\par \par 8/28/19\par She is here with daughter. We have to administer  ml weekly since she does not drink at all anymore. Otherwise labs have not shown progression. Dementia is stable for now. \par \par 9/25/19\par She is here for follow up. She was a bit  agitated today. No new complains.   Most recent CBC showed slight fall in Hgb, creatine is a bit better. She could not stay for blood work due to agitation.\par \par 10/30/19\par Patient is here for a follow-up visit of MM.  Most recent CBC is stable.  Patient denies fever, chills, nausea, vomiting.  She is here with daughter and son Dale. We have been administering  ml weekly, since she does not drink at all anymore and must be encouraged.  Most recent labwork from 10.9.19 shows M-spike now 0.3. She will complete this cycle on Sunday.  She is tolerating the Pomalyst; however, family states administration is difficult.  Her walking is worsening and her dementia is also progressing.  She  No diarrhea, dyspnea or edema.   She does have a 24hr aid at home to assist. She now has incontinence.   They saw neurologist last week. \par \par \par 11/27/19\par She is here for follow up.  She was seen by Dr. Correa of Geriatrics.  Paraproteins with CBC and CMP stable no  evidence of progression.  No complaints. Here with family as always. On her 3rd week of pomylist.

## 2019-12-02 LAB
ALBUMIN MFR SERPL ELPH: 61.8 %
ALBUMIN SERPL ELPH-MCNC: 4.4 G/DL
ALBUMIN SERPL-MCNC: 4.4 G/DL
ALBUMIN/GLOB SERPL: 1.6 RATIO
ALP BLD-CCNC: 87 U/L
ALPHA1 GLOB MFR SERPL ELPH: 5.1 %
ALPHA1 GLOB SERPL ELPH-MCNC: 0.4 G/DL
ALPHA2 GLOB MFR SERPL ELPH: 9 %
ALPHA2 GLOB SERPL ELPH-MCNC: 0.6 G/DL
ALT SERPL-CCNC: 24 U/L
ANION GAP SERPL CALC-SCNC: 14 MMOL/L
AST SERPL-CCNC: 44 U/L
B-GLOBULIN MFR SERPL ELPH: 10.2 %
B-GLOBULIN SERPL ELPH-MCNC: 0.7 G/DL
BILIRUB SERPL-MCNC: 0.6 MG/DL
BUN SERPL-MCNC: 27 MG/DL
CALCIUM SERPL-MCNC: 9.3 MG/DL
CHLORIDE SERPL-SCNC: 102 MMOL/L
CO2 SERPL-SCNC: 23 MMOL/L
CREAT SERPL-MCNC: 1.2 MG/DL
DEPRECATED KAPPA LC FREE/LAMBDA SER: 0.48 RATIO
GAMMA GLOB FLD ELPH-MCNC: 1 G/DL
GAMMA GLOB MFR SERPL ELPH: 13.9 %
GLUCOSE SERPL-MCNC: 72 MG/DL
IGA SER QL IEP: 111 MG/DL
IGG SER QL IEP: 1050 MG/DL
IGM SER QL IEP: 23 MG/DL
INTERPRETATION SERPL IEP-IMP: NORMAL
KAPPA LC CSF-MCNC: 5.66 MG/DL
KAPPA LC SERPL-MCNC: 2.71 MG/DL
M PROTEIN MFR SERPL ELPH: 5.5 %
M PROTEIN SPEC IFE-MCNC: NORMAL
MONOCLON BAND OBS SERPL: 0.4 G/DL
POTASSIUM SERPL-SCNC: 5.1 MMOL/L
PROT SERPL-MCNC: 6.6 G/DL
PROT SERPL-MCNC: 7.2 G/DL
PROT SERPL-MCNC: 7.2 G/DL
SODIUM SERPL-SCNC: 139 MMOL/L

## 2019-12-05 ENCOUNTER — RX RENEWAL (OUTPATIENT)
Age: 74
End: 2019-12-05

## 2019-12-13 ENCOUNTER — APPOINTMENT (OUTPATIENT)
Dept: INFUSION THERAPY | Facility: CLINIC | Age: 74
End: 2019-12-13

## 2019-12-13 RX ORDER — SODIUM CHLORIDE 9 MG/ML
500 INJECTION INTRAMUSCULAR; INTRAVENOUS; SUBCUTANEOUS
Refills: 0 | Status: DISCONTINUED | OUTPATIENT
Start: 2019-12-13 | End: 2020-04-13

## 2019-12-13 RX ADMIN — SODIUM CHLORIDE 500 MILLILITER(S): 9 INJECTION INTRAMUSCULAR; INTRAVENOUS; SUBCUTANEOUS at 14:21

## 2019-12-13 RX ADMIN — SODIUM CHLORIDE 500 MILLILITER(S): 9 INJECTION INTRAMUSCULAR; INTRAVENOUS; SUBCUTANEOUS at 15:21

## 2019-12-23 ENCOUNTER — APPOINTMENT (OUTPATIENT)
Dept: INFUSION THERAPY | Facility: CLINIC | Age: 74
End: 2019-12-23

## 2019-12-23 ENCOUNTER — APPOINTMENT (OUTPATIENT)
Dept: HEMATOLOGY ONCOLOGY | Facility: CLINIC | Age: 74
End: 2019-12-23

## 2019-12-27 ENCOUNTER — RX RENEWAL (OUTPATIENT)
Age: 74
End: 2019-12-27

## 2020-01-08 ENCOUNTER — APPOINTMENT (OUTPATIENT)
Dept: INFUSION THERAPY | Facility: CLINIC | Age: 75
End: 2020-01-08

## 2020-01-08 ENCOUNTER — OUTPATIENT (OUTPATIENT)
Dept: OUTPATIENT SERVICES | Facility: HOSPITAL | Age: 75
LOS: 1 days | Discharge: HOME | End: 2020-01-08

## 2020-01-08 DIAGNOSIS — F03.90 UNSPECIFIED DEMENTIA WITHOUT BEHAVIORAL DISTURBANCE: ICD-10-CM

## 2020-01-08 DIAGNOSIS — C90.00 MULTIPLE MYELOMA NOT HAVING ACHIEVED REMISSION: ICD-10-CM

## 2020-01-08 RX ORDER — SODIUM CHLORIDE 9 MG/ML
500 INJECTION INTRAMUSCULAR; INTRAVENOUS; SUBCUTANEOUS
Refills: 0 | Status: COMPLETED | OUTPATIENT
Start: 2020-01-08 | End: 2020-01-08

## 2020-01-08 RX ADMIN — SODIUM CHLORIDE 500 MILLILITER(S): 9 INJECTION INTRAMUSCULAR; INTRAVENOUS; SUBCUTANEOUS at 14:50

## 2020-01-20 ENCOUNTER — LABORATORY RESULT (OUTPATIENT)
Age: 75
End: 2020-01-20

## 2020-01-20 ENCOUNTER — APPOINTMENT (OUTPATIENT)
Dept: HEMATOLOGY ONCOLOGY | Facility: CLINIC | Age: 75
End: 2020-01-20
Payer: MEDICARE

## 2020-01-20 ENCOUNTER — APPOINTMENT (OUTPATIENT)
Dept: INFUSION THERAPY | Facility: CLINIC | Age: 75
End: 2020-01-20
Payer: MEDICARE

## 2020-01-20 VITALS
BODY MASS INDEX: 25.23 KG/M2 | SYSTOLIC BLOOD PRESSURE: 150 MMHG | RESPIRATION RATE: 14 BRPM | HEIGHT: 66 IN | DIASTOLIC BLOOD PRESSURE: 70 MMHG | WEIGHT: 157 LBS | HEART RATE: 85 BPM

## 2020-01-20 PROCEDURE — 99213 OFFICE O/P EST LOW 20 MIN: CPT

## 2020-01-20 RX ORDER — SODIUM CHLORIDE 9 MG/ML
500 INJECTION INTRAMUSCULAR; INTRAVENOUS; SUBCUTANEOUS
Refills: 0 | Status: DISCONTINUED | OUTPATIENT
Start: 2020-01-20 | End: 2020-05-25

## 2020-01-20 RX ADMIN — SODIUM CHLORIDE 500 MILLILITER(S): 9 INJECTION INTRAMUSCULAR; INTRAVENOUS; SUBCUTANEOUS at 13:27

## 2020-01-20 NOTE — PHYSICAL EXAM
[Restricted in physically strenuous activity but ambulatory and able to carry out work of a light or sedentary nature] : Status 1- Restricted in physically strenuous activity but ambulatory and able to carry out work of a light or sedentary nature, e.g., light house work, office work [Normal] : normal appearance, no rash, nodules, vesicles, ulcers, erythema [de-identified] : trace edema in Lower extremities on Lasix if needed  [de-identified] : PPM [de-identified] :  right arm scar [de-identified] : dementia, incontinence, ambulation difficulties but stable

## 2020-01-20 NOTE — HISTORY OF PRESENT ILLNESS
[Disease:__________________________] : Disease: [unfilled] [Therapy: ___] : Therapy: [unfilled] [Cycle: ___] : Cycle: [unfilled] [Day: ___] : Day: [unfilled] [de-identified] : This is a 72 year old female with history of sick sinus syndrome s/p PPM and as well as progressive dementia over 1.5 years was to see a specialist in [facility] who presented to [facility] due to a fall  as a result of  worsening gait on 11/23/17.   Her work up demonstrated , left tibial swelling, Acute displaced fracture of the distal humeral diaphysis.\par Multiple lytic bone lesions.  CT neck Multiple lytic lesions,  CT head At least mild to moderate hydrocephalus involving lateral, third and fourth ventricles. Innumerable lytic lesions.\par \par She was seen by Neurology who recommended outpatient LP and  Orhto casted the right humerus fracture and recommended Oncologic Ortho evaluation.  \par \par On admission she was noted to be Hypercalcemic and in ADRY. She received pamidronate 60 mg once with IVF and her hypercalcemia  and ADRY  resolved.\par \par Work up demonstrated lambda 352, kappa 8.5 with ration of 0.02,  SPEP M spike of 2.7 , Beta 2 7.2. Urine Total protein of  284 H  Albumin was 4.0\par \par She underwent a bone marrow biopsy on 12/1/17 that showed  flow cytometry performed at [facility]    Oncology report - Clonal (IgG-Lambda) plasma cells (28.2% of total), normocellular to mildly hypercellular (30-40%)    with a sheet of plasma cells, a large aggregate of plasma cells and    markedly increased interstitial plasma cells.  Megakaryocytes are    present.  Maturing myeloid and erythroid precursors are present.  Amyloid    deposition is not identified.  Special stains for Amyloid (Crystal Violet    and Congo Red) are negative.  Stainable iron is present (1-2+).  \par \par She also had a left lower extremity hematoma evacuation.\par \par She is here with her kids [FreeTextEntry1] : Started Pomalyst in 03/2019.\par VD was started  initially while we waited for R. On cycle 2 with R we stopped RD on  6/2018 and was on  maintenance Velcade progressed did not tolerate Daratumumab due to infusion time and was agitated and Started Pomalyst on March 29 2019  [de-identified] : 01/09/2018\par  Patient is here today with her family members for a follow up visit. So far got 4 doses of velcade and is currently on Day 8 of revlimid.  As per family, they noticed that her dementia is more worse  after starting VRD. Also reports Insomnia. She was evaluated by neurologist and so far, the  reason for her dementia is not established. Decision on Lumbar puncture is not yet made as neurologist did not think her symptoms were related to Normal pressure hydrocephalus.\par  ALso complaints of insomnia since being on VRD, likely from steriods. Her right arm has been healing well since the surgery and she will begin physical therapy soon. \par PET/CT results reviewed. Noted to have multiple FDG avid lytic lesions including right anterior second rib with SUV of 9.5.\par \par 1/25/18 \par She is here for follow up for her myeloma. Her  lambda started to fall and mspike went down as well she was only on Revlimid for 8 days,  Her GFR is also improving now 71.\par We decreased her dexamethasone to 16 mg po weekly due to psychosis. She now has mild ankle swelling in bilateral extremities. She now has urinary incontinence. Has bilateral hip pains in morning that improve with ambulation.  She started this cycle 2 on Teusday\par \par 2/15/18\par She is here for follow up. Her Neurologist in [facility] found a CT scan from [facility] back that also had hydrocephalus thus Alziemers is the cause of her dementia. They were seen in [facility] and VRD was agreed with although their dosing recommendations was to move it to q28 days. We decided to keep things as is since they are use to this schedule. She small  blisters on eye lids small under 5 mm  one in each lower eye lid. They are not painful.  She is getting out of chair with ease. No other complaints> She also has repeat CT scan of head as per her neurologist results are bellow.\par \par 3/9/18\par She is here for follow up. She is dong well. She is here with her daughter. Trazodone is helping. No pains. He rash in the eyes has resolved they did not see optho.\par \par \par 3/28/18\par She is here for follow up with her son. She is doing well.  Son states she is physically better.  No complaints. Swelling in legs improved.\par \par 4/18/48\par She is here for follow up. She once again has Sty in her eyelids. It does not botehr her. she is doing well otherwise she has no pain.\par \par 5/10/18\par She is here for follow up with her daughter. HEr eyes are better using a wash. DId not see optho, Now on SSRI and Quatione?. Her light chain and Mspike keeps coming down\par \par 5/30/18 \par \par She is here for follow up. She is doing well. Her LIght chain is not normal. still had positive ROSALIND.  She started the Revlimid 3 days ago.\par \par 6/22/18\par She is here for follow up with daughter. She is having a hard time with mood swings with steroids so dexa was  stopped  it. \par \par 7/18/18\par She is here with her daughter. She is doing well. Still has some LE swelling.  No other issues. \par \par 9/12/18\par She is here for follow up. Doing well. HEre with her daughter. No new complaints. Myeloma panel stable \par \par 10/10/18\par Here with her daughter. Doing well. No issues> myeloma pane stable\par \par 11/7/18\par Patient is here for follow-up with her daughter.  Doing well.   No neuropathy.  We will continue monthly Zometa with one dose today.  We will also send the patient for a whole body PETscan.\par \par 12/5/18\par Patient is here for follow-up with her daughter.  The M-spike and lambda light chain is trending up but will continue current regimen.  The fact that there may be progression, we will continue with monthly Zometa for now.  They will do the PET/CT after the holidays.  Continue the Velcade. \par \par 1/30/19\par Patient is here for a follow-up visit with her son.  She is feeling well with no complaints.  Most recent CBC is stable.  Patient denies fever, chills, nausea, vomiting.  We will continue Zometa every 3 months.  The Lambda proteins are slowly increasing, now at 4.73 from 3.69 .  She is still taking the Acyclovir for prophylaxis.  \par She had a PET/CT to evaluate her bone disease that showed  Since December 14, 2017, overall decreased or resolved FDG uptake in  numerous lytic osseous lesions, consistent with good treatment response  (for example, 52% decrease in a right anterior second rib lesion now with  SUV of 4.6); highest SUV 5.8 in the right anterior 10th rib, 9% decrease.  2.  No new sites of abnormal FDG uptake.  \par \par 2/27/19\par Patient is here for a follow-up visit with her daughter.   Patient denies fever, chills, nausea, vomiting or any new rashes.  Her  passed away 2 weeks ago due to pulmonary fibrosis.  The daughter states that due to her dementia, she often forgets that he is not around and asks where he is.  \par \par 4/24/19\par She is here for follow up. Her involved light chain started to increase and was consistent on repeat testing. We stopped Velcade she not tolerate Daratumumab due to infusion time and was agitated and Started Pomaylst on March 29 2019. She is doing well.  We did not use a steroid since she gets agitated. No complaints.\par \par 5/22/19\par Patient is here for a follow-up visit with her daughter.  No new complaints.  Patient denies fever, chills, nausea, vomiting, or rashes.   She is tolerating the Pomalyst, her Lamda light chains are decreasing, they are 4.70 on 4/24.  \par \par 6/19/19\par Patient is here for a follow-up visit of MM with her son.  She is feeling well, only complaint is low BP (~90s/60s) with some weakness, they were instructed to decrease the Irbesartan to 75mg daily from 150mg.  Most recent myeloma panel is stable from last month.  She is on the off week of Pomalyst.  \par \par 7/31/19\par She is here with her son. she had ADRY due to dehydration. I started her on weekly hydration and it did improved. She is already on next cycle of Pomlyst. MM panel without progression.\par \par 8/28/19\par She is here with daughter. We have to administer  ml weekly since she does not drink at all anymore. Otherwise labs have not shown progression. Dementia is stable for now. \par \par 9/25/19\par She is here for follow up. She was a bit  agitated today. No new complains.   Most recent CBC showed slight fall in Hgb, creatine is a bit better. She could not stay for blood work due to agitation.\par \par 10/30/19\par Patient is here for a follow-up visit of MM.  Most recent CBC is stable.  Patient denies fever, chills, nausea, vomiting.  She is here with daughter and son Dale. We have been administering  ml weekly, since she does not drink at all anymore and must be encouraged.  Most recent labwork from 10.9.19 shows M-spike now 0.3. She will complete this cycle on Sunday.  She is tolerating the Pomalyst; however, family states administration is difficult.  Her walking is worsening and her dementia is also progressing.  She  No diarrhea, dyspnea or edema.   She does have a 24hr aid at home to assist. She now has incontinence.   They saw neurologist last week. \par \par \par 11/27/19\par She is here for follow up.  She was seen by Dr. Correa of Geriatrics.  Paraproteins with CBC and CMP stable no  evidence of progression.  No complaints. Here with family as always. On her 3rd week of pomylist. \par \par 1/20/2020\par Patient is here for a follow-up visit with her son and daughter.  Most recent M-spike is stable at 0.4 from 11/2019 and light chains and immunoglobulin levels as well..  Family states that the patient's cognitive decline is more rapid over recent months.  Patient denies fever, chills, nausea, vomiting, new pain or bleeding.  She started her most recent cycle of Pomalyst but stopped cycle after 5 days due to lower extremity swelling that has nearly resolved..

## 2020-01-20 NOTE — ASSESSMENT
[Palliative] : Goals of care discussed with patient: Palliative [FreeTextEntry1] : #IgG Lamda Multiple Myeloma with lytic bone lesions, anemia and hypercalcemia  on presentation, normal LDH,  Beta 2 7.2. Repeat levels were as follows , Albumin was 4.0. repeat Beta 2- 4.8\par  FISH- hyperploidy and gain of CCN1 which puts her at  standard risk.  PET/CT multiple FDG avid lytic lesion consistent with myeloma. Was on  VRd..  with low dose  she gets agitated on steroids\par - She achieved a VGPR and  was having issues with Dexa . Its hard to give her pills so was on Velcade maintenance , no neuropathy\par - The M-spike and lambda light chains went up on Velcade so we started  Pomalyst in 03/2019 with falling paraproteins that are now stable\par \par Plan:\par - will  check myeloma panel and depending on results will either c.w or  hold and monitor. Pomalyst 3 weeks on, 1 week off; we had a discussion that since her dementia is worsening and suspect will be the cause of her mortality that at some point will stop all myeloma related medications if that is the case \par - we discussed that if her paraproteins remain stable, we can weigh risks/benefits of holding Pomalyst\par - c/w Zometa 3.5 mg  every 3 months; last given in November, next due Feb 2020\par - c/w calcium and Vitamin D\par - off acyclovir, continue on Aspirin \par \par #Pathologic Right Humerus fracture s/p fixation - Dr. Glenn Salter\par \par #Dementia - most likely Alzheimers \par - Neurology following Jessica Aguiar  fax \par - on SSRI and Seroquel \par - worsening as per family\par \par #Anemia due to MM  and or treatment \par - stable, mild\par \par #Hypercalcemia from MM\par - resolved \par \par #Edema\par - edema has improved, no longer taking Lasix.  Norvasc was stopped. Had some swelling again that is better no. \par - Compression stocking and elevation encouraged \par - on Irbesartan to 75mg 2/2 low BP\par \par #ADRY due to dehydration, she is not drinking unless family tells her\par - continue with IV fluids 500cc every other week now\par \par RTC in 1 month with CBC, CMP, serum immunoelectrophoresis with hydration

## 2020-01-20 NOTE — CONSULT LETTER
[Dear  ___] : Dear  [unfilled], [Please see my note below.] : Please see my note below. [Referral Letter:] : I am referring [unfilled] to you for further evaluation.  My most recent evaluation follows. [Referral Closing:] : Thank you very much for seeing this patient.  If you have any questions, please do not hesitate to contact me. [Sincerely,] : Sincerely, [FreeTextEntry3] : Hemanth

## 2020-01-20 NOTE — REVIEW OF SYSTEMS
[Lower Ext Edema] : lower extremity edema [Dry Eyes] : dryness of the eyes [Incontinence] : incontinence [Muscle Weakness] : muscle weakness [Confused] : confusion [Insomnia] : insomnia [Difficulty Walking] : difficulty walking [Negative] : Heme/Lymph [Fatigue] : no fatigue [Joint Pain] : no joint pain [Recent Change In Weight] : ~T no recent weight change [Muscle Pain] : no muscle pain [de-identified] : Dementia

## 2020-01-21 LAB
ALBUMIN MFR SERPL ELPH: 59.7 %
ALBUMIN SERPL ELPH-MCNC: 4.4 G/DL
ALBUMIN SERPL-MCNC: 4.4 G/DL
ALBUMIN/GLOB SERPL: 1.5 RATIO
ALP BLD-CCNC: 92 U/L
ALPHA1 GLOB MFR SERPL ELPH: 5.4 %
ALPHA1 GLOB SERPL ELPH-MCNC: 0.4 G/DL
ALPHA2 GLOB MFR SERPL ELPH: 9.3 %
ALPHA2 GLOB SERPL ELPH-MCNC: 0.7 G/DL
ALT SERPL-CCNC: 20 U/L
ANION GAP SERPL CALC-SCNC: 12 MMOL/L
AST SERPL-CCNC: 19 U/L
B-GLOBULIN MFR SERPL ELPH: 10.6 %
B-GLOBULIN SERPL ELPH-MCNC: 0.8 G/DL
BILIRUB SERPL-MCNC: 0.3 MG/DL
BUN SERPL-MCNC: 25 MG/DL
CALCIUM SERPL-MCNC: 9.3 MG/DL
CHLORIDE SERPL-SCNC: 104 MMOL/L
CO2 SERPL-SCNC: 23 MMOL/L
CREAT SERPL-MCNC: 0.9 MG/DL
DEPRECATED KAPPA LC FREE/LAMBDA SER: 0.35 RATIO
GAMMA GLOB FLD ELPH-MCNC: 1.1 G/DL
GAMMA GLOB MFR SERPL ELPH: 15 %
GLUCOSE SERPL-MCNC: 65 MG/DL
HCT VFR BLD CALC: 34.7 %
HGB BLD-MCNC: 11.8 G/DL
IGA SER QL IEP: 101 MG/DL
IGG SER QL IEP: 1164 MG/DL
IGM SER QL IEP: 20 MG/DL
INTERPRETATION SERPL IEP-IMP: NORMAL
KAPPA LC CSF-MCNC: 5.95 MG/DL
KAPPA LC SERPL-MCNC: 2.07 MG/DL
M PROTEIN MFR SERPL ELPH: 7.2 %
M PROTEIN SPEC IFE-MCNC: NORMAL
MCHC RBC-ENTMCNC: 33.7 PG
MCHC RBC-ENTMCNC: 34 G/DL
MCV RBC AUTO: 99.1 FL
MONOCLON BAND OBS SERPL: 0.5 G/DL
PLATELET # BLD AUTO: 242 K/UL
PMV BLD: 9.1 FL
POTASSIUM SERPL-SCNC: 3.8 MMOL/L
PROT SERPL-MCNC: 7 G/DL
PROT SERPL-MCNC: 7.3 G/DL
PROT SERPL-MCNC: 7.3 G/DL
RBC # BLD: 3.5 M/UL
RBC # FLD: 14.9 %
SODIUM SERPL-SCNC: 139 MMOL/L
WBC # FLD AUTO: 5.73 K/UL

## 2020-01-21 RX ORDER — POMALIDOMIDE 2 MG/1
2 CAPSULE ORAL
Qty: 21 | Refills: 0 | Status: COMPLETED | COMMUNITY
Start: 2019-05-09 | End: 2020-01-21

## 2020-01-22 ENCOUNTER — APPOINTMENT (OUTPATIENT)
Dept: INFUSION THERAPY | Facility: CLINIC | Age: 75
End: 2020-01-22

## 2020-01-22 ENCOUNTER — APPOINTMENT (OUTPATIENT)
Dept: HEMATOLOGY ONCOLOGY | Facility: CLINIC | Age: 75
End: 2020-01-22

## 2020-02-05 ENCOUNTER — APPOINTMENT (OUTPATIENT)
Dept: INFUSION THERAPY | Facility: CLINIC | Age: 75
End: 2020-02-05

## 2020-02-08 ENCOUNTER — APPOINTMENT (OUTPATIENT)
Dept: GERIATRICS | Facility: HOME HEALTH | Age: 75
End: 2020-02-08
Payer: MEDICARE

## 2020-02-08 VITALS
TEMPERATURE: 98.2 F | DIASTOLIC BLOOD PRESSURE: 68 MMHG | SYSTOLIC BLOOD PRESSURE: 110 MMHG | HEART RATE: 78 BPM | OXYGEN SATURATION: 98 % | RESPIRATION RATE: 16 BRPM

## 2020-02-08 PROCEDURE — 99348 HOME/RES VST EST LOW MDM 30: CPT

## 2020-02-08 NOTE — ASSESSMENT
[FreeTextEntry1] : Skin tear RUE\par - local wound care with ABX cream and non-adherent dressing\par - change BID\par \par AD \par - at baseline\par - c/w current RX\par \par SND s/p PPM/HTN/HLD\par - at baseline\par - outpatient FU w/Cards and EPS\par - c/w current RX\par \par Multiple Myeloma\par - c/w current RX\par - outpatient Heme/Onc FU\par \par Hypothyroid\par - c/w current RX\par

## 2020-02-08 NOTE — PHYSICAL EXAM
[General Appearance - In No Acute Distress] : in no acute distress [General Appearance - Alert] : alert [General Appearance - Well Nourished] : well nourished [Sclera] : the sclera and conjunctiva were normal [General Appearance - Well Developed] : well developed [No Oral Cyanosis] : no oral cyanosis [No Oral Pallor] : no oral pallor [Normal Oral Mucosa] : normal oral mucosa [Examination Of The Oral Cavity] : the lips and gums were normal [Outer Ear] : the ears and nose were normal in appearance [Hearing Threshold Finger Rub Not [place]] : hearing was normal [Jugular Venous Distention Increased] : there was no jugular-venous distention [Neck Appearance] : the appearance of the neck was normal [Neck Cervical Mass (___cm)] : no neck mass was observed [Exaggerated Use Of Accessory Muscles For Inspiration] : no accessory muscle use [Respiration, Rhythm And Depth] : normal respiratory rhythm and effort [Auscultation Breath Sounds / Voice Sounds] : lungs were clear to auscultation bilaterally [Abdomen Soft] : soft [Heart Rate And Rhythm] : heart rate was normal and rhythm regular [Heart Sounds] : normal S1 and S2 [Edema] : there was no peripheral edema [Involuntary Movements] : no involuntary movements were seen [Abdomen Tenderness] : non-tender [Nail Clubbing] : no clubbing  or cyanosis of the fingernails [Cranial Nerves] : cranial nerves 2-12 were intact [] : no rash [No Focal Deficits] : no focal deficits [FreeTextEntry1] : Pleasant but confused

## 2020-02-08 NOTE — HISTORY OF PRESENT ILLNESS
[FreeTextEntry1] : Patient seen and examined at home.  Patient is homebound 2/2 AD.  HHA present during visit.  HHA rpeorts patient sustained a skin tear to the RUE today while taking a shower.  No blunt trauma requiring X-ray, skinned self on shower door.  No fall.  No acute complaints.  No acute complaints.  No CP/SOB. No abdominal complaints with good appetite and regular BM's.  No urinary complaints.  No recent falls

## 2020-02-18 LAB
25(OH)D3 SERPL-MCNC: 42 NG/ML
ALBUMIN SERPL ELPH-MCNC: 3.9 G/DL
ALP BLD-CCNC: 82 U/L
ALT SERPL-CCNC: 16 U/L
ANION GAP SERPL CALC-SCNC: 14 MMOL/L
AST SERPL-CCNC: 18 U/L
BASOPHILS # BLD AUTO: 0.03 K/UL
BASOPHILS NFR BLD AUTO: 0.7 %
BILIRUB SERPL-MCNC: 0.4 MG/DL
BUN SERPL-MCNC: 17 MG/DL
CALCIUM SERPL-MCNC: 9 MG/DL
CHLORIDE SERPL-SCNC: 105 MMOL/L
CHOLEST SERPL-MCNC: 193 MG/DL
CHOLEST/HDLC SERPL: 6 RATIO
CO2 SERPL-SCNC: 22 MMOL/L
CREAT SERPL-MCNC: 0.8 MG/DL
EOSINOPHIL # BLD AUTO: 0.13 K/UL
EOSINOPHIL NFR BLD AUTO: 3 %
ESTIMATED AVERAGE GLUCOSE: 94 MG/DL
FOLATE SERPL-MCNC: >20 NG/ML
GLUCOSE SERPL-MCNC: 71 MG/DL
HBA1C MFR BLD HPLC: 4.9 %
HCT VFR BLD CALC: 36.1 %
HDLC SERPL-MCNC: 32 MG/DL
HGB BLD-MCNC: 12.1 G/DL
IMM GRANULOCYTES NFR BLD AUTO: 1.4 %
LDLC SERPL CALC-MCNC: 121 MG/DL
LYMPHOCYTES # BLD AUTO: 1.97 K/UL
LYMPHOCYTES NFR BLD AUTO: 45.7 %
MAN DIFF?: NORMAL
MCHC RBC-ENTMCNC: 33.3 PG
MCHC RBC-ENTMCNC: 33.5 G/DL
MCV RBC AUTO: 99.4 FL
MONOCYTES # BLD AUTO: 0.37 K/UL
MONOCYTES NFR BLD AUTO: 8.6 %
NEUTROPHILS # BLD AUTO: 1.75 K/UL
NEUTROPHILS NFR BLD AUTO: 40.6 %
PLATELET # BLD AUTO: 280 K/UL
POTASSIUM SERPL-SCNC: 4.8 MMOL/L
PROT SERPL-MCNC: 6.4 G/DL
RBC # BLD: 3.63 M/UL
RBC # FLD: 14.6 %
SODIUM SERPL-SCNC: 141 MMOL/L
TRIGL SERPL-MCNC: 267 MG/DL
TSH SERPL-ACNC: 7.47 UIU/ML
VIT B12 SERPL-MCNC: 970 PG/ML
WBC # FLD AUTO: 4.31 K/UL

## 2020-02-19 ENCOUNTER — OUTPATIENT (OUTPATIENT)
Dept: OUTPATIENT SERVICES | Facility: HOSPITAL | Age: 75
LOS: 1 days | Discharge: HOME | End: 2020-02-19

## 2020-02-19 ENCOUNTER — APPOINTMENT (OUTPATIENT)
Dept: INFUSION THERAPY | Facility: CLINIC | Age: 75
End: 2020-02-19
Payer: MEDICARE

## 2020-02-19 ENCOUNTER — APPOINTMENT (OUTPATIENT)
Dept: HEMATOLOGY ONCOLOGY | Facility: CLINIC | Age: 75
End: 2020-02-19
Payer: MEDICARE

## 2020-02-19 DIAGNOSIS — C90.00 MULTIPLE MYELOMA NOT HAVING ACHIEVED REMISSION: ICD-10-CM

## 2020-02-19 PROCEDURE — 99213 OFFICE O/P EST LOW 20 MIN: CPT

## 2020-02-23 NOTE — REVIEW OF SYSTEMS
[Fatigue] : no fatigue [Recent Change In Weight] : ~T no recent weight change [Dry Eyes] : dryness of the eyes [Joint Pain] : no joint pain [Incontinence] : incontinence [Lower Ext Edema] : lower extremity edema [Muscle Weakness] : muscle weakness [Difficulty Walking] : difficulty walking [Muscle Pain] : no muscle pain [Confused] : confusion [Insomnia] : insomnia [Negative] : Heme/Lymph [de-identified] : Dementia

## 2020-02-23 NOTE — CONSULT LETTER
[Dear  ___] : Dear  [unfilled], [Referral Closing:] : Thank you very much for seeing this patient.  If you have any questions, please do not hesitate to contact me. [Please see my note below.] : Please see my note below. [Referral Letter:] : I am referring [unfilled] to you for further evaluation.  My most recent evaluation follows. [Sincerely,] : Sincerely, [FreeTextEntry3] : Hemanth

## 2020-02-23 NOTE — PHYSICAL EXAM
[Restricted in physically strenuous activity but ambulatory and able to carry out work of a light or sedentary nature] : Status 1- Restricted in physically strenuous activity but ambulatory and able to carry out work of a light or sedentary nature, e.g., light house work, office work [Normal] : normal appearance, no rash, nodules, vesicles, ulcers, erythema [de-identified] : trace edema in Lower extremities on Lasix if needed  [de-identified] : PPM [de-identified] :  right arm scar [de-identified] : dementia, incontinence, ambulation difficulties but stable

## 2020-02-23 NOTE — ASSESSMENT
[FreeTextEntry1] : #IgG Lamda Multiple Myeloma with lytic bone lesions, anemia and hypercalcemia  on presentation, normal LDH,  Beta 2 7.2. Repeat levels were as follows , Albumin was 4.0. repeat Beta 2- 4.8\par  FISH- hyperploidy and gain of CCN1 which puts her at  standard risk.  PET/CT multiple FDG avid lytic lesion consistent with myeloma. Was on  VRd..  with low dose  she gets agitated on steroids\par - She achieved a VGPR and  was having issues with Dexa . Its hard to give her pills so was on Velcade maintenance , no neuropathy\par - The M-spike and lambda light chains went up on Velcade so we started  Pomalyst in 03/2019 with falling paraproteins that are now stable and in 1/2020 we held treatment due to her dementia. \par \par Plan:\par - will monitor off treatment, will stop Zometa as well and will restart once she has active diseae. \par - c/w calcium and Vitamin D\par - off acyclovir, we can stop ASA now\par \par #Pathologic Right Humerus fracture s/p fixation - Dr. Glenn Salter\par \par #Dementia - most likely Alzheimers \par - Neurology following Jsesica Aguiar  fax \par - on SSRI and Seroquel \par - worsening as per family\par \par #Anemia due to MM  and or treatment \par - stable, mild\par \par #Hypercalcemia from MM\par - resolved \par \par #Edema\par - edema has improved, no longer taking Lasix.  Norvasc was stopped. Had some swelling again that is better no. \par - Compression stocking and elevation encouraged \par - on Irbesartan to 75mg 2/2 low BP\par \par #ADRY due to dehydration, she is not drinking unless family tells her\par - continue with IV fluids 500cc every other week as needed\par \par RTC in 1 month with CBC, CMP, serum immunoelectrophoresis with hydration [Palliative] : Goals of care discussed with patient: Palliative

## 2020-02-23 NOTE — HISTORY OF PRESENT ILLNESS
[Disease:__________________________] : Disease: [unfilled] [Cycle: ___] : Cycle: [unfilled] [Therapy: ___] : Therapy: [unfilled] [de-identified] : This is a 72 year old female with history of sick sinus syndrome s/p PPM and as well as progressive dementia over 1.5 years was to see a specialist in [facility] who presented to [facility] due to a fall  as a result of  worsening gait on 11/23/17.   Her work up demonstrated , left tibial swelling, Acute displaced fracture of the distal humeral diaphysis.\par Multiple lytic bone lesions.  CT neck Multiple lytic lesions,  CT head At least mild to moderate hydrocephalus involving lateral, third and fourth ventricles. Innumerable lytic lesions.\par \par She was seen by Neurology who recommended outpatient LP and  Orhto casted the right humerus fracture and recommended Oncologic Ortho evaluation.  \par \par On admission she was noted to be Hypercalcemic and in ADRY. She received pamidronate 60 mg once with IVF and her hypercalcemia  and ADRY  resolved.\par \par Work up demonstrated lambda 352, kappa 8.5 with ration of 0.02,  SPEP M spike of 2.7 , Beta 2 7.2. Urine Total protein of  284 H  Albumin was 4.0\par \par She underwent a bone marrow biopsy on 12/1/17 that showed  flow cytometry performed at [facility]    Oncology report - Clonal (IgG-Lambda) plasma cells (28.2% of total), normocellular to mildly hypercellular (30-40%)    with a sheet of plasma cells, a large aggregate of plasma cells and    markedly increased interstitial plasma cells.  Megakaryocytes are    present.  Maturing myeloid and erythroid precursors are present.  Amyloid    deposition is not identified.  Special stains for Amyloid (Crystal Violet    and Congo Red) are negative.  Stainable iron is present (1-2+).  \par \par She also had a left lower extremity hematoma evacuation.\par \par She is here with her kids [FreeTextEntry1] : Started Pomalyst in 03/2019.\par VD was started  initially while we waited for R. On cycle 2 with R we stopped RD on  6/2018 and was on  maintenance Velcade progressed did not tolerate Daratumumab due to infusion time and was agitated and Started Pomalyst on March 29 2019  [de-identified] : 01/09/2018\par  Patient is here today with her family members for a follow up visit. So far got 4 doses of velcade and is currently on Day 8 of revlimid.  As per family, they noticed that her dementia is more worse  after starting VRD. Also reports Insomnia. She was evaluated by neurologist and so far, the  reason for her dementia is not established. Decision on Lumbar puncture is not yet made as neurologist did not think her symptoms were related to Normal pressure hydrocephalus.\par  ALso complaints of insomnia since being on VRD, likely from steriods. Her right arm has been healing well since the surgery and she will begin physical therapy soon. \par PET/CT results reviewed. Noted to have multiple FDG avid lytic lesions including right anterior second rib with SUV of 9.5.\par \par 1/25/18 \par She is here for follow up for her myeloma. Her  lambda started to fall and mspike went down as well she was only on Revlimid for 8 days,  Her GFR is also improving now 71.\par We decreased her dexamethasone to 16 mg po weekly due to psychosis. She now has mild ankle swelling in bilateral extremities. She now has urinary incontinence. Has bilateral hip pains in morning that improve with ambulation.  She started this cycle 2 on Teusday\par \par 2/15/18\par She is here for follow up. Her Neurologist in [facility] found a CT scan from [facility] back that also had hydrocephalus thus Alziemers is the cause of her dementia. They were seen in [facility] and VRD was agreed with although their dosing recommendations was to move it to q28 days. We decided to keep things as is since they are use to this schedule. She small  blisters on eye lids small under 5 mm  one in each lower eye lid. They are not painful.  She is getting out of chair with ease. No other complaints> She also has repeat CT scan of head as per her neurologist results are bellow.\par \par 3/9/18\par She is here for follow up. She is dong well. She is here with her daughter. Trazodone is helping. No pains. He rash in the eyes has resolved they did not see optho.\par \par \par 3/28/18\par She is here for follow up with her son. She is doing well.  Son states she is physically better.  No complaints. Swelling in legs improved.\par \par 4/18/48\par She is here for follow up. She once again has Sty in her eyelids. It does not botehr her. she is doing well otherwise she has no pain.\par \par 5/10/18\par She is here for follow up with her daughter. HEr eyes are better using a wash. DId not see optho, Now on SSRI and Quatione?. Her light chain and Mspike keeps coming down\par \par 5/30/18 \par \par She is here for follow up. She is doing well. Her LIght chain is not normal. still had positive ROSALIND.  She started the Revlimid 3 days ago.\par \par 6/22/18\par She is here for follow up with daughter. She is having a hard time with mood swings with steroids so dexa was  stopped  it. \par \par 7/18/18\par She is here with her daughter. She is doing well. Still has some LE swelling.  No other issues. \par \par 9/12/18\par She is here for follow up. Doing well. HEre with her daughter. No new complaints. Myeloma panel stable \par \par 10/10/18\par Here with her daughter. Doing well. No issues> myeloma pane stable\par \par 11/7/18\par Patient is here for follow-up with her daughter.  Doing well.   No neuropathy.  We will continue monthly Zometa with one dose today.  We will also send the patient for a whole body PETscan.\par \par 12/5/18\par Patient is here for follow-up with her daughter.  The M-spike and lambda light chain is trending up but will continue current regimen.  The fact that there may be progression, we will continue with monthly Zometa for now.  They will do the PET/CT after the holidays.  Continue the Velcade. \par \par 1/30/19\par Patient is here for a follow-up visit with her son.  She is feeling well with no complaints.  Most recent CBC is stable.  Patient denies fever, chills, nausea, vomiting.  We will continue Zometa every 3 months.  The Lambda proteins are slowly increasing, now at 4.73 from 3.69 .  She is still taking the Acyclovir for prophylaxis.  \par She had a PET/CT to evaluate her bone disease that showed  Since December 14, 2017, overall decreased or resolved FDG uptake in  numerous lytic osseous lesions, consistent with good treatment response  (for example, 52% decrease in a right anterior second rib lesion now with  SUV of 4.6); highest SUV 5.8 in the right anterior 10th rib, 9% decrease.  2.  No new sites of abnormal FDG uptake.  \par \par 2/27/19\par Patient is here for a follow-up visit with her daughter.   Patient denies fever, chills, nausea, vomiting or any new rashes.  Her  passed away 2 weeks ago due to pulmonary fibrosis.  The daughter states that due to her dementia, she often forgets that he is not around and asks where he is.  \par \par 4/24/19\par She is here for follow up. Her involved light chain started to increase and was consistent on repeat testing. We stopped Velcade she not tolerate Daratumumab due to infusion time and was agitated and Started Pomaylst on March 29 2019. She is doing well.  We did not use a steroid since she gets agitated. No complaints.\par \par 5/22/19\par Patient is here for a follow-up visit with her daughter.  No new complaints.  Patient denies fever, chills, nausea, vomiting, or rashes.   She is tolerating the Pomalyst, her Lamda light chains are decreasing, they are 4.70 on 4/24.  \par \par 6/19/19\par Patient is here for a follow-up visit of MM with her son.  She is feeling well, only complaint is low BP (~90s/60s) with some weakness, they were instructed to decrease the Irbesartan to 75mg daily from 150mg.  Most recent myeloma panel is stable from last month.  She is on the off week of Pomalyst.  \par \par 7/31/19\par She is here with her son. she had ADRY due to dehydration. I started her on weekly hydration and it did improved. She is already on next cycle of Pomlyst. MM panel without progression.\par \par 8/28/19\par She is here with daughter. We have to administer  ml weekly since she does not drink at all anymore. Otherwise labs have not shown progression. Dementia is stable for now. \par \par 9/25/19\par She is here for follow up. She was a bit  agitated today. No new complains.   Most recent CBC showed slight fall in Hgb, creatine is a bit better. She could not stay for blood work due to agitation.\par \par 10/30/19\par Patient is here for a follow-up visit of MM.  Most recent CBC is stable.  Patient denies fever, chills, nausea, vomiting.  She is here with daughter and son Dale. We have been administering  ml weekly, since she does not drink at all anymore and must be encouraged.  Most recent labwork from 10.9.19 shows M-spike now 0.3. She will complete this cycle on Sunday.  She is tolerating the Pomalyst; however, family states administration is difficult.  Her walking is worsening and her dementia is also progressing.  She  No diarrhea, dyspnea or edema.   She does have a 24hr aid at home to assist. She now has incontinence.   They saw neurologist last week. \par \par \par 11/27/19\par She is here for follow up.  She was seen by Dr. Correa of Geriatrics.  Paraproteins with CBC and CMP stable no  evidence of progression.  No complaints. Here with family as always. On her 3rd week of pomylist. \par \par 1/20/2020\par Patient is here for a follow-up visit with her son and daughter.  Most recent M-spike is stable at 0.4 from 11/2019 and light chains and immunoglobulin levels as well..  Family states that the patient's cognitive decline is more rapid over recent months.  Patient denies fever, chills, nausea, vomiting, new pain or bleeding.  She started her most recent cycle of Pomalyst but stopped cycle after 5 days due to lower extremity swelling that has nearly resolved..  \par \par 2/19/20\par She is here for follow up with family. We have stopped her pomylist and will only observe her due to her dementia. She had recent blood work done by her geriatric teams and they looked stable. Will check Myeloma panel with next visit.  [Day: ___] : Day: [unfilled]

## 2020-02-23 NOTE — REASON FOR VISIT
[Follow-Up Visit] : a follow-up visit for [FreeTextEntry2] : Multiple Myeloma [Family Member] : family member

## 2020-03-04 ENCOUNTER — APPOINTMENT (OUTPATIENT)
Dept: INFUSION THERAPY | Facility: CLINIC | Age: 75
End: 2020-03-04

## 2020-03-06 ENCOUNTER — APPOINTMENT (OUTPATIENT)
Dept: CARDIOLOGY | Facility: CLINIC | Age: 75
End: 2020-03-06

## 2020-03-18 ENCOUNTER — APPOINTMENT (OUTPATIENT)
Dept: INFUSION THERAPY | Facility: CLINIC | Age: 75
End: 2020-03-18

## 2020-04-02 ENCOUNTER — APPOINTMENT (OUTPATIENT)
Dept: GERIATRICS | Facility: HOME HEALTH | Age: 75
End: 2020-04-02
Payer: MEDICARE

## 2020-04-02 VITALS
HEART RATE: 62 BPM | RESPIRATION RATE: 16 BRPM | SYSTOLIC BLOOD PRESSURE: 120 MMHG | DIASTOLIC BLOOD PRESSURE: 70 MMHG | TEMPERATURE: 100.2 F | OXYGEN SATURATION: 95 %

## 2020-04-02 DIAGNOSIS — N39.0 URINARY TRACT INFECTION, SITE NOT SPECIFIED: ICD-10-CM

## 2020-04-02 PROCEDURE — 99348 HOME/RES VST EST LOW MDM 30: CPT

## 2020-04-02 RX ORDER — IRBESARTAN 75 MG/1
75 TABLET ORAL DAILY
Qty: 90 | Refills: 1 | Status: DISCONTINUED | COMMUNITY
Start: 2019-06-19 | End: 2020-04-02

## 2020-04-02 NOTE — REVIEW OF SYSTEMS
[Fever] : fever [Feeling Tired] : feeling tired [Incontinence] : incontinence [Chills] : no chills [Discharge From Eyes] : no purulent discharge from the eyes [Nosebleeds] : no nosebleeds [Nasal Discharge] : no nasal discharge [Sore Throat] : no sore throat [Hoarseness] : no hoarseness [Chest Pain] : no chest pain [Palpitations] : no palpitations [Lower Ext Edema] : no lower extremity edema [Shortness Of Breath] : no shortness of breath [Wheezing] : no wheezing [Cough] : no cough [SOB on Exertion] : no shortness of breath during exertion [Abdominal Pain] : no abdominal pain [Vomiting] : no vomiting [Diarrhea] : no diarrhea [Limb Swelling] : no limb swelling [Skin Lesions] : no skin lesions [Skin Wound] : no skin wound [Swollen Glands] : no swollen glands [Swollen Glands In The Neck] : no swollen glands in the neck [de-identified] : pt has dementia and is non verbal at time of exam

## 2020-04-02 NOTE — ASSESSMENT
[FreeTextEntry1] : UTI \par - temp of 100.2\par - pts diaper had foul smell of urine\par - d/t dementia pt unable to verbalize pain or burning on urination\par - start cipro 500 mg twice a day for 5 days\par \par Dementia / multiple myeloma\par - daughter states slightly worse pt non verbal at time of exam\par - daughter will f/u with pts neurologist after COVID-19 pandemic is over\par \par HTN\par - stable \par - daughter states pt was taken off bp meds d/t low bp pts bp has been stable off meds according to daughter\par

## 2020-04-02 NOTE — PHYSICAL EXAM
[General Appearance - Alert] : alert [General Appearance - In No Acute Distress] : in no acute distress [General Appearance - Well Nourished] : well nourished [Sclera] : the sclera and conjunctiva were normal [PERRL With Normal Accommodation] : pupils were equal in size, round, and reactive to light [Normal Oral Mucosa] : normal oral mucosa [No Oral Pallor] : no oral pallor [No Oral Cyanosis] : no oral cyanosis [Outer Ear] : the ears and nose were normal in appearance [Examination Of The Oral Cavity] : the lips and gums were normal [Oropharynx] : The oropharynx was normal [Neck Appearance] : the appearance of the neck was normal [Neck Cervical Mass (___cm)] : no neck mass was observed [Jugular Venous Distention Increased] : there was no jugular-venous distention [Thyroid Diffuse Enlargement] : the thyroid was not enlarged [Respiration, Rhythm And Depth] : normal respiratory rhythm and effort [Exaggerated Use Of Accessory Muscles For Inspiration] : no accessory muscle use [Auscultation Breath Sounds / Voice Sounds] : lungs were clear to auscultation bilaterally [Heart Rate And Rhythm] : heart rate was normal and rhythm regular [Heart Sounds Gallop] : no gallops [Heart Sounds Pericardial Friction Rub] : no pericardial rub [Full Pulse] : the pedal pulses are present [Edema] : there was no peripheral edema [Bowel Sounds] : normal bowel sounds [Abdomen Soft] : soft [Abdomen Tenderness] : non-tender [Nail Clubbing] : no clubbing  or cyanosis of the fingernails [Involuntary Movements] : no involuntary movements were seen [Musculoskeletal - Swelling] : no joint swelling seen [Skin Color & Pigmentation] : normal skin color and pigmentation [Skin Turgor] : normal skin turgor [] : no rash [Skin Lesions] : no skin lesions [FreeTextEntry1] : unable to assess due to pts dementia and non verbal status at time of exam

## 2020-04-02 NOTE — HISTORY OF PRESENT ILLNESS
[FreeTextEntry1] : The patient is a 74 year old female seen and examined in the home with daughter and HHA present.  Daughter states that pt has been tired and sleeping more for the past few days.  The pt is non verbal daughter states that dementia is slightly worse as pt would speak a little bit and smile and now she does not.  Daughter also states that due to COVID-19 pandemic she has not brought the pt out for scheduled MD visits pt is followed by neurologist at [facility] appt scheduled for early April which daughter cancelled and has not yet re-scheduled.

## 2020-05-07 ENCOUNTER — APPOINTMENT (OUTPATIENT)
Dept: GERIATRICS | Facility: HOME HEALTH | Age: 75
End: 2020-05-07
Payer: MEDICARE

## 2020-05-07 ENCOUNTER — OUTPATIENT (OUTPATIENT)
Dept: OUTPATIENT SERVICES | Facility: HOSPITAL | Age: 75
LOS: 1 days | Discharge: HOME | End: 2020-05-07

## 2020-05-07 ENCOUNTER — TRANSCRIPTION ENCOUNTER (OUTPATIENT)
Age: 75
End: 2020-05-07

## 2020-05-07 PROCEDURE — 99213 OFFICE O/P EST LOW 20 MIN: CPT | Mod: 95

## 2020-05-07 NOTE — REVIEW OF SYSTEMS
[Negative] : ENT [Fever] : no fever [Night Sweats] : no night sweats [Chest Pain] : no chest pain [Shortness Of Breath] : no shortness of breath [FreeTextEntry7] : eating well [FreeTextEntry9] : see HPI

## 2020-05-07 NOTE — REASON FOR VISIT
[Home] : at home, [unfilled] , at the time of the visit. [Medical Office: ([facility])___] : at the medical office located in  [Acute] : an acute visit [Family Member] : family member [FreeTextEntry4] : daughter [FreeTextEntry1] : heel ulcer

## 2020-05-07 NOTE — ASSESSMENT
[FreeTextEntry1] : left heel eschar c/w DTI of left heell\par start vitamin C 500mg daily, Zn 250mg daily (daughter ays she will purchase on her own\par purchase sheep skin layer for her bootie\par Advised to keep heel supported in air with pillow under her calf so that it is suspended without pressure.\par Refer to  agency for home wound care.\par \par Continue maint meds\par safe environment and supportive care for dementia.

## 2020-05-07 NOTE — HISTORY OF PRESENT ILLNESS
[Family Member] : family member [FreeTextEntry1] : advanced dementia; has been bed-bound [FreeTextEntry2] : bed bound patient with dementia; three days ago daughter noticed ulcer of left heel - very dark and purple; patient does eat and drink; started to use lotion on lesion and purchased a heel bootie from [employer]

## 2020-05-07 NOTE — END OF VISIT
[FreeTextEntry3] : 20 minutes video telehealth\par new left heel DTI with eschar\par Dementia\par Htn

## 2020-05-07 NOTE — PHYSICAL EXAM
[No Acute Distress] : no acute distress [de-identified] : left heel with medial and underside contiguos eschar of approximately 3cm diameter

## 2020-05-11 ENCOUNTER — APPOINTMENT (OUTPATIENT)
Dept: HEMATOLOGY ONCOLOGY | Facility: CLINIC | Age: 75
End: 2020-05-11

## 2020-05-14 ENCOUNTER — APPOINTMENT (OUTPATIENT)
Dept: HEMATOLOGY ONCOLOGY | Facility: CLINIC | Age: 75
End: 2020-05-14
Payer: MEDICARE

## 2020-05-14 PROCEDURE — 99441: CPT | Mod: 95

## 2020-05-14 RX ORDER — ESCITALOPRAM OXALATE 10 MG/1
10 TABLET ORAL DAILY
Qty: 30 | Refills: 2 | Status: ACTIVE | COMMUNITY
Start: 2020-05-14 | End: 1900-01-01

## 2020-06-09 RX ORDER — ATORVASTATIN CALCIUM 20 MG/1
20 TABLET, FILM COATED ORAL DAILY
Qty: 90 | Refills: 1 | Status: ACTIVE | COMMUNITY
Start: 2017-09-20 | End: 1900-01-01

## 2020-06-26 DIAGNOSIS — R53.1 WEAKNESS: ICD-10-CM

## 2020-06-26 DIAGNOSIS — F03.90 UNSPECIFIED DEMENTIA WITHOUT BEHAVIORAL DISTURBANCE: ICD-10-CM

## 2020-06-26 DIAGNOSIS — G47.00 INSOMNIA, UNSPECIFIED: ICD-10-CM

## 2020-06-26 DIAGNOSIS — I10 ESSENTIAL (PRIMARY) HYPERTENSION: ICD-10-CM

## 2020-07-24 ENCOUNTER — APPOINTMENT (OUTPATIENT)
Dept: GERIATRICS | Facility: HOME HEALTH | Age: 75
End: 2020-07-24
Payer: MEDICARE

## 2020-07-24 VITALS
DIASTOLIC BLOOD PRESSURE: 50 MMHG | HEART RATE: 67 BPM | RESPIRATION RATE: 18 BRPM | OXYGEN SATURATION: 96 % | TEMPERATURE: 98.2 F | SYSTOLIC BLOOD PRESSURE: 90 MMHG

## 2020-07-24 DIAGNOSIS — C90.00 MULTIPLE MYELOMA NOT HAVING ACHIEVED REMISSION: ICD-10-CM

## 2020-07-24 DIAGNOSIS — E83.52 HYPERCALCEMIA: ICD-10-CM

## 2020-07-24 DIAGNOSIS — I49.5 SICK SINUS SYNDROME: ICD-10-CM

## 2020-07-24 DIAGNOSIS — L89.90 PRESSURE ULCER OF UNSPECIFIED SITE, UNSPECIFIED STAGE: ICD-10-CM

## 2020-07-24 DIAGNOSIS — F03.90 UNSPECIFIED DEMENTIA W/OUT BEHAVIORAL DISTURBANCE: ICD-10-CM

## 2020-07-24 DIAGNOSIS — I10 ESSENTIAL (PRIMARY) HYPERTENSION: ICD-10-CM

## 2020-07-24 PROCEDURE — 99348 HOME/RES VST EST LOW MDM 30: CPT | Mod: GW

## 2020-07-24 NOTE — ASSESSMENT
[FreeTextEntry1] : Patient now on Hospice, no acute interventions indicated nor desired by family.  Comfort measures only at this time.  Care per Hospice.  PU to right elbow, left heel and sacrum being cared for by Hospice, c/w local wound care.  Call for acute issues.  Attempted to call both son and Dtr multiple times throughout visit.  Left messages to call office and I would be more than happy to talk with them.  No changes to current Rx at this time.

## 2020-07-24 NOTE — HISTORY OF PRESENT ILLNESS
[FreeTextEntry1] : Patient seen and examined at home.  Patient is homebound 2/2 generalized weakness/debility.  HHA present during visit.  Called Dtr Reema and son Dale, no answer.  Spoke w/Hospice nurse Lindsay.  Patient now on hospice.  Doing well, at baseline.  Medications adjusted per Hospice.  HHA reports patient at baseline.  Wounds to right elbow, left heel and sacrum being cared for  y Hospice, local wound care only.

## 2020-07-24 NOTE — PHYSICAL EXAM
[General Appearance - Alert] : alert [General Appearance - In No Acute Distress] : in no acute distress [General Appearance - Well Nourished] : well nourished [General Appearance - Well Developed] : well developed [Normal Oral Mucosa] : normal oral mucosa [Sclera] : the sclera and conjunctiva were normal [No Oral Cyanosis] : no oral cyanosis [Outer Ear] : the ears and nose were normal in appearance [No Oral Pallor] : no oral pallor [Neck Appearance] : the appearance of the neck was normal [Hearing Threshold Finger Rub Not Rhea] : hearing was normal [Examination Of The Oral Cavity] : the lips and gums were normal [Jugular Venous Distention Increased] : there was no jugular-venous distention [Neck Cervical Mass (___cm)] : no neck mass was observed [Respiration, Rhythm And Depth] : normal respiratory rhythm and effort [Auscultation Breath Sounds / Voice Sounds] : lungs were clear to auscultation bilaterally [Exaggerated Use Of Accessory Muscles For Inspiration] : no accessory muscle use [Heart Rate And Rhythm] : heart rate was normal and rhythm regular [Heart Sounds] : normal S1 and S2 [Edema] : there was no peripheral edema [Abdomen Soft] : soft [Abdomen Tenderness] : non-tender [] : no rash [Involuntary Movements] : no involuntary movements were seen [Nail Clubbing] : no clubbing  or cyanosis of the fingernails [No Focal Deficits] : no focal deficits [Cranial Nerves] : cranial nerves 2-12 were intact [FreeTextEntry1] : Aphasic/lethargic

## 2020-08-20 PROBLEM — I49.5 SINUS NODE DYSFUNCTION: Status: ACTIVE | Noted: 2017-10-17

## 2020-12-23 PROBLEM — N39.0 URINARY TRACT INFECTION WITHOUT HEMATURIA, SITE UNSPECIFIED: Status: RESOLVED | Noted: 2020-04-02 | Resolved: 2020-12-23

## 2021-10-06 PROBLEM — I10 ESSENTIAL HYPERTENSION: Status: ACTIVE | Noted: 2017-01-19

## 2024-05-28 NOTE — REVIEW OF SYSTEMS
Pt requesting referral to Dr Kirk for breast cancer.    [Dry Eyes] : dryness of the eyes [Lower Ext Edema] : lower extremity edema [Incontinence] : incontinence [Confused] : confusion [Difficulty Walking] : difficulty walking [Negative] : Heme/Lymph [Fatigue] : no fatigue [Recent Change In Weight] : ~T no recent weight change [Joint Pain] : no joint pain [Muscle Pain] : no muscle pain [FreeTextEntry5] : Edema has moderately improved [de-identified] : Dementia